# Patient Record
Sex: MALE | Race: WHITE | NOT HISPANIC OR LATINO | Employment: OTHER | URBAN - METROPOLITAN AREA
[De-identification: names, ages, dates, MRNs, and addresses within clinical notes are randomized per-mention and may not be internally consistent; named-entity substitution may affect disease eponyms.]

---

## 2017-05-03 ENCOUNTER — RECORDS - HEALTHEAST (OUTPATIENT)
Dept: LAB | Facility: CLINIC | Age: 67
End: 2017-05-03

## 2017-05-03 LAB
CHOLEST SERPL-MCNC: 163 MG/DL
FASTING STATUS PATIENT QL REPORTED: ABNORMAL
HDLC SERPL-MCNC: 34 MG/DL
LDLC SERPL CALC-MCNC: 100 MG/DL
PSA SERPL-MCNC: 0.2 NG/ML (ref 0–4.5)
TRIGL SERPL-MCNC: 146 MG/DL

## 2018-09-06 ENCOUNTER — RECORDS - HEALTHEAST (OUTPATIENT)
Dept: LAB | Facility: CLINIC | Age: 68
End: 2018-09-06

## 2018-09-06 LAB
ALBUMIN SERPL-MCNC: 3.6 G/DL (ref 3.5–5)
ALP SERPL-CCNC: 58 U/L (ref 45–120)
ALT SERPL W P-5'-P-CCNC: 36 U/L (ref 0–45)
ANION GAP SERPL CALCULATED.3IONS-SCNC: 9 MMOL/L (ref 5–18)
AST SERPL W P-5'-P-CCNC: 20 U/L (ref 0–40)
BASOPHILS # BLD AUTO: 0 THOU/UL (ref 0–0.2)
BASOPHILS NFR BLD AUTO: 0 % (ref 0–2)
BILIRUB SERPL-MCNC: 0.7 MG/DL (ref 0–1)
BUN SERPL-MCNC: 17 MG/DL (ref 8–22)
CALCIUM SERPL-MCNC: 9.2 MG/DL (ref 8.5–10.5)
CHLORIDE BLD-SCNC: 105 MMOL/L (ref 98–107)
CHOLEST SERPL-MCNC: 153 MG/DL
CO2 SERPL-SCNC: 27 MMOL/L (ref 22–31)
CREAT SERPL-MCNC: 0.85 MG/DL (ref 0.7–1.3)
EOSINOPHIL # BLD AUTO: 0.3 THOU/UL (ref 0–0.4)
EOSINOPHIL NFR BLD AUTO: 4 % (ref 0–6)
ERYTHROCYTE [DISTWIDTH] IN BLOOD BY AUTOMATED COUNT: 13.2 % (ref 11–14.5)
FASTING STATUS PATIENT QL REPORTED: ABNORMAL
GFR SERPL CREATININE-BSD FRML MDRD: >60 ML/MIN/1.73M2
GLUCOSE BLD-MCNC: 128 MG/DL (ref 70–125)
HCT VFR BLD AUTO: 41.7 % (ref 40–54)
HDLC SERPL-MCNC: 37 MG/DL
HGB BLD-MCNC: 13.8 G/DL (ref 14–18)
LDLC SERPL CALC-MCNC: 98 MG/DL
LYMPHOCYTES # BLD AUTO: 2.4 THOU/UL (ref 0.8–4.4)
LYMPHOCYTES NFR BLD AUTO: 37 % (ref 20–40)
MCH RBC QN AUTO: 30.8 PG (ref 27–34)
MCHC RBC AUTO-ENTMCNC: 33.1 G/DL (ref 32–36)
MCV RBC AUTO: 93 FL (ref 80–100)
MONOCYTES # BLD AUTO: 0.6 THOU/UL (ref 0–0.9)
MONOCYTES NFR BLD AUTO: 9 % (ref 2–10)
NEUTROPHILS # BLD AUTO: 3.2 THOU/UL (ref 2–7.7)
NEUTROPHILS NFR BLD AUTO: 50 % (ref 50–70)
PLATELET # BLD AUTO: 283 THOU/UL (ref 140–440)
PMV BLD AUTO: 10.7 FL (ref 8.5–12.5)
POTASSIUM BLD-SCNC: 4.4 MMOL/L (ref 3.5–5)
PROT SERPL-MCNC: 6.3 G/DL (ref 6–8)
PSA SERPL-MCNC: 0.2 NG/ML (ref 0–4.5)
RBC # BLD AUTO: 4.48 MILL/UL (ref 4.4–6.2)
SODIUM SERPL-SCNC: 141 MMOL/L (ref 136–145)
TRIGL SERPL-MCNC: 89 MG/DL
WBC: 6.4 THOU/UL (ref 4–11)

## 2019-03-26 ENCOUNTER — RECORDS - HEALTHEAST (OUTPATIENT)
Dept: LAB | Facility: CLINIC | Age: 69
End: 2019-03-26

## 2019-03-26 LAB
C REACTIVE PROTEIN LHE: 0.2 MG/DL (ref 0–0.8)
CK SERPL-CCNC: 92 U/L (ref 30–190)
ERYTHROCYTE [SEDIMENTATION RATE] IN BLOOD BY WESTERGREN METHOD: 5 MM/HR (ref 0–15)

## 2019-08-30 ENCOUNTER — RECORDS - HEALTHEAST (OUTPATIENT)
Dept: LAB | Facility: CLINIC | Age: 69
End: 2019-08-30

## 2019-08-30 LAB
CHOLEST SERPL-MCNC: 141 MG/DL
FASTING STATUS PATIENT QL REPORTED: ABNORMAL
HDLC SERPL-MCNC: 33 MG/DL
LDLC SERPL CALC-MCNC: 76 MG/DL
TRIGL SERPL-MCNC: 162 MG/DL

## 2020-09-29 ENCOUNTER — RECORDS - HEALTHEAST (OUTPATIENT)
Dept: LAB | Facility: CLINIC | Age: 70
End: 2020-09-29

## 2020-09-30 ENCOUNTER — RECORDS - HEALTHEAST (OUTPATIENT)
Dept: LAB | Facility: CLINIC | Age: 70
End: 2020-09-30

## 2020-09-30 LAB
CHOLEST SERPL-MCNC: 178 MG/DL
FASTING STATUS PATIENT QL REPORTED: ABNORMAL
FOLATE SERPL-MCNC: 6.2 NG/ML
HDLC SERPL-MCNC: 34 MG/DL
LDLC SERPL CALC-MCNC: 83 MG/DL
PSA SERPL-MCNC: 0.1 NG/ML (ref 0–4.5)
TRIGL SERPL-MCNC: 305 MG/DL
TSH SERPL DL<=0.005 MIU/L-ACNC: 2.27 UIU/ML (ref 0.3–5)
VIT B12 SERPL-MCNC: 386 PG/ML (ref 213–816)

## 2020-10-01 LAB — 25(OH)D3 SERPL-MCNC: 14.8 NG/ML (ref 30–80)

## 2020-12-22 ENCOUNTER — RECORDS - HEALTHEAST (OUTPATIENT)
Dept: LAB | Facility: CLINIC | Age: 70
End: 2020-12-22

## 2020-12-23 LAB — 25(OH)D3 SERPL-MCNC: 50.8 NG/ML (ref 30–80)

## 2021-05-27 ENCOUNTER — RECORDS - HEALTHEAST (OUTPATIENT)
Dept: ADMINISTRATIVE | Facility: CLINIC | Age: 71
End: 2021-05-27

## 2021-06-01 ENCOUNTER — RECORDS - HEALTHEAST (OUTPATIENT)
Dept: ADMINISTRATIVE | Facility: CLINIC | Age: 71
End: 2021-06-01

## 2021-09-17 ENCOUNTER — OFFICE VISIT (OUTPATIENT)
Dept: NEUROLOGY | Facility: CLINIC | Age: 71
End: 2021-09-17
Payer: COMMERCIAL

## 2021-09-17 ENCOUNTER — LAB (OUTPATIENT)
Dept: LAB | Facility: HOSPITAL | Age: 71
End: 2021-09-17
Payer: COMMERCIAL

## 2021-09-17 VITALS
DIASTOLIC BLOOD PRESSURE: 64 MMHG | HEIGHT: 74 IN | HEART RATE: 79 BPM | SYSTOLIC BLOOD PRESSURE: 118 MMHG | BODY MASS INDEX: 26.31 KG/M2 | WEIGHT: 205 LBS

## 2021-09-17 DIAGNOSIS — R63.4 WEIGHT LOSS: ICD-10-CM

## 2021-09-17 DIAGNOSIS — Z86.59 HX OF MAJOR DEPRESSION: ICD-10-CM

## 2021-09-17 DIAGNOSIS — G20.C PARKINSONISM, UNSPECIFIED PARKINSONISM TYPE (H): Primary | ICD-10-CM

## 2021-09-17 DIAGNOSIS — G20.C PARKINSONISM, UNSPECIFIED PARKINSONISM TYPE (H): ICD-10-CM

## 2021-09-17 LAB
ALBUMIN SERPL-MCNC: 3.7 G/DL (ref 3.5–5)
ALP SERPL-CCNC: 76 U/L (ref 45–120)
ALT SERPL W P-5'-P-CCNC: 25 U/L (ref 0–45)
ANION GAP SERPL CALCULATED.3IONS-SCNC: 11 MMOL/L (ref 5–18)
AST SERPL W P-5'-P-CCNC: 18 U/L (ref 0–40)
BILIRUB SERPL-MCNC: 0.8 MG/DL (ref 0–1)
BUN SERPL-MCNC: 17 MG/DL (ref 8–28)
CALCIUM SERPL-MCNC: 10 MG/DL (ref 8.5–10.5)
CHLORIDE BLD-SCNC: 101 MMOL/L (ref 98–107)
CO2 SERPL-SCNC: 26 MMOL/L (ref 22–31)
CREAT SERPL-MCNC: 0.81 MG/DL (ref 0.7–1.3)
GFR SERPL CREATININE-BSD FRML MDRD: 90 ML/MIN/1.73M2
GLUCOSE BLD-MCNC: 88 MG/DL (ref 70–125)
POTASSIUM BLD-SCNC: 4.5 MMOL/L (ref 3.5–5)
PROT SERPL-MCNC: 6.9 G/DL (ref 6–8)
SODIUM SERPL-SCNC: 138 MMOL/L (ref 136–145)
TSH SERPL DL<=0.005 MIU/L-ACNC: 2.12 UIU/ML (ref 0.3–5)
VIT B12 SERPL-MCNC: 425 PG/ML (ref 213–816)

## 2021-09-17 PROCEDURE — 82390 ASSAY OF CERULOPLASMIN: CPT

## 2021-09-17 PROCEDURE — 84443 ASSAY THYROID STIM HORMONE: CPT

## 2021-09-17 PROCEDURE — 36415 COLL VENOUS BLD VENIPUNCTURE: CPT

## 2021-09-17 PROCEDURE — 82607 VITAMIN B-12: CPT

## 2021-09-17 PROCEDURE — 80053 COMPREHEN METABOLIC PANEL: CPT

## 2021-09-17 PROCEDURE — 99205 OFFICE O/P NEW HI 60 MIN: CPT | Performed by: PSYCHIATRY & NEUROLOGY

## 2021-09-17 PROCEDURE — 83921 ORGANIC ACID SINGLE QUANT: CPT

## 2021-09-17 RX ORDER — FLUOXETINE 10 MG/1
10 CAPSULE ORAL DAILY
COMMUNITY
End: 2022-07-27

## 2021-09-17 RX ORDER — ARIPIPRAZOLE 2 MG/1
2 TABLET ORAL DAILY
COMMUNITY
End: 2022-07-27

## 2021-09-17 RX ORDER — FINASTERIDE 5 MG/1
5 TABLET, FILM COATED ORAL DAILY
COMMUNITY

## 2021-09-17 ASSESSMENT — MIFFLIN-ST. JEOR: SCORE: 1759.62

## 2021-09-17 NOTE — NURSING NOTE
Chief Complaint   Patient presents with     Shaking     Slight shaking bilateral wrist, more on right. Pt reports intermittent bitting down.      Victoria Han MA on 9/17/2021 at 11:26 AM

## 2021-09-17 NOTE — PROGRESS NOTES
NEUROLOGY OUTPATIENT CONSULT NOTE   Sep 17, 2021     CHIEF COMPLAINT/REASON FOR VISIT/REASON FOR CONSULT  Patient presents with:  Shaking: Slight shaking bilateral wrist, more on right. Pt reports intermittent bitting down.     REASON FOR CONSULTATION- Tremors      HISTORY OF PRESENT ILLNESS  Bala Hopper is a 70 year old male seen today for evaluation of Parkinson's disease.  He reports that symptoms have been going on for the last 6 months and he has noted himself over the last 3 months.  He does suffer from depression which has been worse and he is lost a lot of weight in the last 4 months.  Does have some difficulty with his jaw/twitching of the jaw.  Reports some shaking of his right hand.  This is mainly when he is sitting and not doing anything.  He does have difficulty keeping up with people.  Occasionally will feel lightheaded.  Does feel stiffer when he tries to walk.  He has not been on any antipsychotics.  Is on Abilify currently for his depression.  His writing has become less legible compared to before.  He is right-handed.  He has not been eating and has been sleeping and most of the time because of his depression.  Denies any cognitive issues.      Previous history is reviewed and this is unchanged.    PAST MEDICAL/SURGICAL HISTORY  History reviewed. No pertinent past medical history.  There is no problem list on file for this patient.  Significant for diabetes, high cholesterol, depression, sleep disorder      FAMILY HISTORY  History reviewed. No pertinent family history.   Family history positive for severe Parkinson's and a great uncle    SOCIAL HISTORY  Social History     Tobacco Use     Smoking status: Never Smoker     Smokeless tobacco: Never Used   Substance Use Topics     Alcohol use: Not Currently     Drug use: Never       SYSTEMS REVIEW  Twelve-system ROS was done and other than the HPI this was negative except for balance and coordination problems, movement disorder, ringing in the  "ears, hearing loss, vision symptoms, sleepiness during the day, sleeping problems, anxiety, depression, weight loss, difficulty breathing during sleep    MEDICATIONS  ARIPiprazole (ABILIFY) 2 MG tablet, Take 2 mg by mouth daily  ascorbic acid (VITAMIN C) 500 MG tablet, [ASCORBIC ACID (VITAMIN C) 500 MG TABLET] Take 1,000 mg by mouth daily.  aspirin 81 MG EC tablet, [ASPIRIN 81 MG EC TABLET] Take 81 mg by mouth daily.  atorvastatin (LIPITOR) 40 MG tablet, [ATORVASTATIN (LIPITOR) 40 MG TABLET] Take 40 mg by mouth daily.  finasteride (PROSCAR) 5 MG tablet, Take 5 mg by mouth daily  FLUoxetine (PROZAC) 10 MG capsule, Take 10 mg by mouth daily  glipiZIDE (GLUCOTROL) 5 MG tablet, [GLIPIZIDE (GLUCOTROL) 5 MG TABLET] Take 5 mg by mouth 2 (two) times a day before meals.  Insulin Glargine-Lixisenatide (SOLIQUA SC), 26 Units  metFORMIN (GLUCOPHAGE) 1000 MG tablet, [METFORMIN (GLUCOPHAGE) 1000 MG TABLET] Take 1,000 mg by mouth 2 (two) times a day with meals.  tamsulosin (FLOMAX) 0.4 mg Cp24, [TAMSULOSIN (FLOMAX) 0.4 MG CP24] Take 0.4 mg by mouth Daily after breakfast.  escitalopram oxalate (LEXAPRO) 20 MG tablet, [ESCITALOPRAM OXALATE (LEXAPRO) 20 MG TABLET] Take 20 mg by mouth daily. (Patient not taking: Reported on 9/17/2021)    No current facility-administered medications on file prior to visit.       PHYSICAL EXAMINATION  VITALS: /64 (BP Location: Left arm, Patient Position: Sitting)   Pulse 79   Ht 1.88 m (6' 2\")   Wt 93 kg (205 lb)   BMI 26.32 kg/m    GENERAL: Healthy appearing, alert, no acute distress, normal habitus.  CARDIOVASCULAR: Extremities warm and well perfused. Pulses present.   EYES: Funduscopic exam limited.   NEUROLOGICAL:  Patient is awake and oriented to self, place and time.  Attention span is normal.  Memory is grossly intact.  Language is fluent and follows commands appropriately.  Appropriate fund of knowledge. Cranial nerves 2-12 are intact. Masked facies.  There is no pronator drift.  Motor " exam shows 5/5 strength in all extremities.  Tone is symmetric bilaterally in upper and lower extremities with cog wheeling in both UE.  Reflexes are symmetric and 2+ in upper extremities and lower extremities. Sensory exam is grossly intact to light touch, pin prick and vibration.  Finger to nose and heel to shin is without dysmetria.  Romberg is negative.  Gait is normal except decreased left arm swing and the patient is able to do tandem walk and walk on toes and heels with mild difficulty.  No significant difficulty with concentric circles.     DIAGNOSTICS    IMPRESSION/REPORT/PLAN  Parkinsonism, unspecified Parkinsonism type (H)  Hx of major depression  Weight loss    This is a 70 year old male with masked facies, cogwheeling, decreased arm swing on the left side, history of depression with Abilify use and weight loss.  Patient does have signs and symptoms of parkinsonism though this could be brought on by Abilify use or could be secondary to depression.  His depression is not under good control.  The weight loss would be more related to depression.  I discussed about trying Sinemet and he would prefer to do a DaTscan to confirm the diagnosis.  We will set him up with a DaTscan.  We will check blood work to look for causes of tremors/parkinsonism.  I will further have him work with a psychiatrist to get off the Abilify to see if that helps.    I can see him back in 6 to 8 weeks.    -     NM Brain Image Tomographic(Spect) Datscan; Future  -     Vitamin B12; Future  -     Methylmalonic Acid; Future  -     TSH with free T4 reflex; Future  -     Comprehensive metabolic panel; Future  -     Copper level; Future  -     Ceruloplasmin; Future    Return in about 2 months (around 11/17/2021) for In-Clinic Visit, After testing.    Over 61 minutes were spent coordinating the care for the patient on the day of the encounter.  This includes previsit, during visit and post visit activities as documented above.  Multiple  treatment options/multiple causes of symptoms discussed with deciding on plan.  (Activities include but not inclusive of reviewing chart, reviewing outside records, reviewing labs and imaging study results as well as the images, patient visit time including getting history and exam,  use if applicable, review of test results with the patient and coming up with a plan in a shared model, counseling patient and family, education and answering patient questions, EMR , EMR diagnosis entry and problem list management, medication reconciliation and prescription management if applicable, paperwork if applicable, printing documents and documentation of the visit activities.)        Howard Contreras MD  Neurologist  [facility]  Tel:- [phone]    This note was dictated using voice recognition software.  Any grammatical or context distortions are unintentional and inherent to the software.

## 2021-09-20 LAB — CERULOPLASMIN SERPL-MCNC: 17 MG/DL (ref 20–60)

## 2021-09-23 LAB — METHYLMALONATE SERPL-SCNC: 0.17 UMOL/L (ref 0–0.4)

## 2021-09-24 ENCOUNTER — TELEPHONE (OUTPATIENT)
Dept: NUCLEAR MEDICINE | Facility: CLINIC | Age: 71
End: 2021-09-24

## 2021-09-27 ENCOUNTER — OFFICE VISIT (OUTPATIENT)
Dept: NEUROLOGY | Facility: CLINIC | Age: 71
End: 2021-09-27
Payer: COMMERCIAL

## 2021-09-27 VITALS
HEIGHT: 74 IN | DIASTOLIC BLOOD PRESSURE: 62 MMHG | HEART RATE: 82 BPM | WEIGHT: 205 LBS | SYSTOLIC BLOOD PRESSURE: 117 MMHG | BODY MASS INDEX: 26.31 KG/M2

## 2021-09-27 DIAGNOSIS — R63.4 WEIGHT LOSS: ICD-10-CM

## 2021-09-27 DIAGNOSIS — G20.C PARKINSONISM, UNSPECIFIED PARKINSONISM TYPE (H): Primary | ICD-10-CM

## 2021-09-27 DIAGNOSIS — E83.00 LOW CERULOPLASMIN LEVEL (H): ICD-10-CM

## 2021-09-27 DIAGNOSIS — Z86.59 HX OF MAJOR DEPRESSION: ICD-10-CM

## 2021-09-27 PROCEDURE — 99214 OFFICE O/P EST MOD 30 MIN: CPT | Performed by: PSYCHIATRY & NEUROLOGY

## 2021-09-27 ASSESSMENT — MIFFLIN-ST. JEOR: SCORE: 1759.62

## 2021-09-27 NOTE — NURSING NOTE
Chief Complaint   Patient presents with     Results     Discuss lab results. Datscan scheduled for 10/12      Victoria Han MA on 9/27/2021 at 12:57 PM

## 2021-09-27 NOTE — LETTER
9/27/2021         RE: Bala Hopper  [address]  [facility] [address]        Dear Colleague,    Thank you for referring your patient, Bala Hopper, to the [facility] [place]. Please see a copy of my visit note below.    NEUROLOGY OUTPATIENT PROGRESS NOTE   Sep 27, 2021     CHIEF COMPLAINT/REASON FOR VISIT/REASON FOR CONSULT  Patient presents with:  Results: Discuss lab results. Datscan scheduled for 10/12     REASON FOR CONSULTATION- Tremors      HISTORY OF PRESENT ILLNESS  Bala Hopper is a 70 year old male seen today for evaluation of Parkinson's disease.  He reports that symptoms have been going on for the last 6 months and he has noted himself over the last 3 months.  He does suffer from depression which has been worse and he is lost a lot of weight in the last 4 months.  Does have some difficulty with his jaw/twitching of the jaw.  Reports some shaking of his right hand.  This is mainly when he is sitting and not doing anything.  He does have difficulty keeping up with people.  Occasionally will feel lightheaded.  Does feel stiffer when he tries to walk.  He has not been on any antipsychotics.  Is on Abilify currently for his depression.  His writing has become less legible compared to before.  He is right-handed.  He has not been eating and has been sleeping and most of the time because of his depression.  Denies any cognitive issues.      9/27/21  Patient returns today.  He reports a spell of dizziness.  Did throw up afterwards.  Tremor is about the same.  Remains on Abilify.  Is also on Prozac.  Was having a lot of rages before she he was on Abilify.  Has not seen a psychiatrist.  Did do the blood work though has not been able to do the DaTscan.    Previous history is reviewed and this is unchanged.    PAST MEDICAL/SURGICAL HISTORY  No past medical history on file.  There is no problem list on file for this patient.  Significant for diabetes, high cholesterol,  depression, sleep disorder      FAMILY HISTORY  No family history on file.   Family history positive for severe Parkinson's and a great uncle    SOCIAL HISTORY  Social History     Tobacco Use     Smoking status: Never Smoker     Smokeless tobacco: Never Used   Substance Use Topics     Alcohol use: Not Currently     Drug use: Never       SYSTEMS REVIEW  Twelve-system ROS was done and other than the HPI this was negative except for balance and coordination problems, movement disorder, ringing in the ears, hearing loss, vision symptoms, sleepiness during the day, sleeping problems, anxiety, depression, weight loss, difficulty breathing during sleep  Reports no other new issues today.    MEDICATIONS  ARIPiprazole (ABILIFY) 2 MG tablet, Take 2 mg by mouth daily  ascorbic acid (VITAMIN C) 500 MG tablet, [ASCORBIC ACID (VITAMIN C) 500 MG TABLET] Take 1,000 mg by mouth daily.  aspirin 81 MG EC tablet, [ASPIRIN 81 MG EC TABLET] Take 81 mg by mouth daily.  atorvastatin (LIPITOR) 40 MG tablet, [ATORVASTATIN (LIPITOR) 40 MG TABLET] Take 40 mg by mouth daily.  finasteride (PROSCAR) 5 MG tablet, Take 5 mg by mouth daily  FLUoxetine (PROZAC) 10 MG capsule, Take 10 mg by mouth daily  glipiZIDE (GLUCOTROL) 5 MG tablet, [GLIPIZIDE (GLUCOTROL) 5 MG TABLET] Take 5 mg by mouth 2 (two) times a day before meals.  Insulin Glargine-Lixisenatide (SOLIQUA SC), 26 Units  metFORMIN (GLUCOPHAGE) 1000 MG tablet, [METFORMIN (GLUCOPHAGE) 1000 MG TABLET] Take 1,000 mg by mouth 2 (two) times a day with meals.  tamsulosin (FLOMAX) 0.4 mg Cp24, [TAMSULOSIN (FLOMAX) 0.4 MG CP24] Take 0.4 mg by mouth Daily after breakfast.  escitalopram oxalate (LEXAPRO) 20 MG tablet, [ESCITALOPRAM OXALATE (LEXAPRO) 20 MG TABLET] Take 20 mg by mouth daily. (Patient not taking: Reported on 9/17/2021)    No current facility-administered medications on file prior to visit.       PHYSICAL EXAMINATION  VITALS: /62 (BP Location: Left arm, Patient Position: Sitting)    "Pulse 82   Ht 1.88 m (6' 2\")   Wt 93 kg (205 lb)   BMI 26.32 kg/m    GENERAL: Healthy appearing, alert, no acute distress, normal habitus.  CARDIOVASCULAR: Extremities warm and well perfused. Pulses present.   NEUROLOGICAL:  Patient is awake and oriented to self, place and time.  Attention span is normal.  Memory is grossly intact.  Language is fluent and follows commands appropriately.  Appropriate fund of knowledge. Cranial nerves 2-12 are intact. Masked facies.  There is no pronator drift.  Motor exam shows 5/5 strength in all extremities.  Tone is symmetric bilaterally in upper and lower extremities with cog wheeling in both UE-somewhat improved.  (Previously reflexes are symmetric and 2+ in upper extremities and lower extremities. Sensory exam is grossly intact to light touch, pin prick and vibration.)  Finger to nose and heel to shin is without dysmetria.  Romberg is negative.  Gait is normal except decreased left arm swing and the patient is able to do tandem walk and walk on toes and heels with mild difficulty.  Previously-no significant difficulty with concentric circles.     LABS  Component      Latest Ref Rng & Units 9/17/2021   Sodium      136 - 145 mmol/L 138   Potassium      3.5 - 5.0 mmol/L 4.5   Chloride      98 - 107 mmol/L 101   Carbon Dioxide      22 - 31 mmol/L 26   Anion Gap      5 - 18 mmol/L 11   Urea Nitrogen      8 - 28 mg/dL 17   Creatinine      0.70 - 1.30 mg/dL 0.81   Calcium      8.5 - 10.5 mg/dL 10.0   Glucose      70 - 125 mg/dL 88   Alkaline Phosphatase      45 - 120 U/L 76   AST      0 - 40 U/L 18   ALT      0 - 45 U/L 25   Protein Total      6.0 - 8.0 g/dL 6.9   Albumin      3.5 - 5.0 g/dL 3.7   Bilirubin Total      0.0 - 1.0 mg/dL 0.8   GFR Estimate      >60 mL/min/1.73m2 90   Vitamin B12      213 - 816 pg/mL 425   Methylmalonic Acid      0.00 - 0.40 umol/L 0.17   TSH      0.30 - 5.00 uIU/mL 2.12   Ceruloplasmin      20 - 60 mg/dL 17 (L) "       DIAGNOSTICS    IMPRESSION/REPORT/PLAN  Parkinsonism, unspecified Parkinsonism type (H)  Hx of major depression  Weight loss  Low ceruloplasmin    This is a 70 year old male with masked facies, cogwheeling, decreased arm swing on the left side, history of depression with Abilify use and weight loss.  Patient does have signs and symptoms of parkinsonism though this could be brought on by Abilify use or could be secondary to depression.  His depression is not under good control.  The weight loss would be more related to depression.  I discussed about trying Sinemet and he would prefer to do a DaTscan to confirm the diagnosis.  DaTscan is scheduled in a few weeks.  I can see him back after that.  Blood work did not show any causes for his tremors.  The low ceruloplasmin is borderline and most likely unrelated to his tremors.  We will encourage him to take a multivitamin with copper. I will further have him work with a psychiatrist to get off the Abilify to see if that helps.    Discussed proper parkinsonism versus extrapyramidal side effects with the patient and family.    I can see him back in 4 weeks.    -     NM Brain Image Tomographic(Spect) Datscan; Future  -    Multivitamin with copper    Return in about 1 month (around 10/27/2021) for In-Clinic Visit, After testing.    Over 31 minutes were spent coordinating the care for the patient on the day of the encounter.  This includes previsit, during visit and post visit activities as documented above.  Reviewing multiple test discussing different types of parkinsonism.  New problem.  (Activities include but not inclusive of reviewing chart, reviewing outside records, reviewing labs and imaging study results as well as the images, patient visit time including getting history and exam,  use if applicable, review of test results with the patient and coming up with a plan in a shared model, counseling patient and family, education and answering patient  questions, EMR , EMR diagnosis entry and problem list management, medication reconciliation and prescription management if applicable, paperwork if applicable, printing documents and documentation of the visit activities.)        Howard Contreras MD  Neurologist  [facility]  Tel:- [phone]    This note was dictated using voice recognition software.  Any grammatical or context distortions are unintentional and inherent to the software.        Again, thank you for allowing me to participate in the care of your patient.        Sincerely,        Howard Contreras MD

## 2021-09-27 NOTE — PROGRESS NOTES
NEUROLOGY OUTPATIENT PROGRESS NOTE   Sep 27, 2021     CHIEF COMPLAINT/REASON FOR VISIT/REASON FOR CONSULT  Patient presents with:  Results: Discuss lab results. Datscan scheduled for 10/12     REASON FOR CONSULTATION- Tremors      HISTORY OF PRESENT ILLNESS  Bala Hopper is a 70 year old male seen today for evaluation of Parkinson's disease.  He reports that symptoms have been going on for the last 6 months and he has noted himself over the last 3 months.  He does suffer from depression which has been worse and he is lost a lot of weight in the last 4 months.  Does have some difficulty with his jaw/twitching of the jaw.  Reports some shaking of his right hand.  This is mainly when he is sitting and not doing anything.  He does have difficulty keeping up with people.  Occasionally will feel lightheaded.  Does feel stiffer when he tries to walk.  He has not been on any antipsychotics.  Is on Abilify currently for his depression.  His writing has become less legible compared to before.  He is right-handed.  He has not been eating and has been sleeping and most of the time because of his depression.  Denies any cognitive issues.      9/27/21  Patient returns today.  He reports a spell of dizziness.  Did throw up afterwards.  Tremor is about the same.  Remains on Abilify.  Is also on Prozac.  Was having a lot of rages before she he was on Abilify.  Has not seen a psychiatrist.  Did do the blood work though has not been able to do the DaTscan.    Previous history is reviewed and this is unchanged.    PAST MEDICAL/SURGICAL HISTORY  No past medical history on file.  There is no problem list on file for this patient.  Significant for diabetes, high cholesterol, depression, sleep disorder      FAMILY HISTORY  No family history on file.   Family history positive for severe Parkinson's and a great uncle    SOCIAL HISTORY  Social History     Tobacco Use     Smoking status: Never Smoker     Smokeless tobacco: Never Used  "  Substance Use Topics     Alcohol use: Not Currently     Drug use: Never       SYSTEMS REVIEW  Twelve-system ROS was done and other than the HPI this was negative except for balance and coordination problems, movement disorder, ringing in the ears, hearing loss, vision symptoms, sleepiness during the day, sleeping problems, anxiety, depression, weight loss, difficulty breathing during sleep  Reports no other new issues today.    MEDICATIONS  ARIPiprazole (ABILIFY) 2 MG tablet, Take 2 mg by mouth daily  ascorbic acid (VITAMIN C) 500 MG tablet, [ASCORBIC ACID (VITAMIN C) 500 MG TABLET] Take 1,000 mg by mouth daily.  aspirin 81 MG EC tablet, [ASPIRIN 81 MG EC TABLET] Take 81 mg by mouth daily.  atorvastatin (LIPITOR) 40 MG tablet, [ATORVASTATIN (LIPITOR) 40 MG TABLET] Take 40 mg by mouth daily.  finasteride (PROSCAR) 5 MG tablet, Take 5 mg by mouth daily  FLUoxetine (PROZAC) 10 MG capsule, Take 10 mg by mouth daily  glipiZIDE (GLUCOTROL) 5 MG tablet, [GLIPIZIDE (GLUCOTROL) 5 MG TABLET] Take 5 mg by mouth 2 (two) times a day before meals.  Insulin Glargine-Lixisenatide (SOLIQUA SC), 26 Units  metFORMIN (GLUCOPHAGE) 1000 MG tablet, [METFORMIN (GLUCOPHAGE) 1000 MG TABLET] Take 1,000 mg by mouth 2 (two) times a day with meals.  tamsulosin (FLOMAX) 0.4 mg Cp24, [TAMSULOSIN (FLOMAX) 0.4 MG CP24] Take 0.4 mg by mouth Daily after breakfast.  escitalopram oxalate (LEXAPRO) 20 MG tablet, [ESCITALOPRAM OXALATE (LEXAPRO) 20 MG TABLET] Take 20 mg by mouth daily. (Patient not taking: Reported on 9/17/2021)    No current facility-administered medications on file prior to visit.       PHYSICAL EXAMINATION  VITALS: /62 (BP Location: Left arm, Patient Position: Sitting)   Pulse 82   Ht 1.88 m (6' 2\")   Wt 93 kg (205 lb)   BMI 26.32 kg/m    GENERAL: Healthy appearing, alert, no acute distress, normal habitus.  CARDIOVASCULAR: Extremities warm and well perfused. Pulses present.   NEUROLOGICAL:  Patient is awake and oriented to " self, place and time.  Attention span is normal.  Memory is grossly intact.  Language is fluent and follows commands appropriately.  Appropriate fund of knowledge. Cranial nerves 2-12 are intact. Masked facies.  There is no pronator drift.  Motor exam shows 5/5 strength in all extremities.  Tone is symmetric bilaterally in upper and lower extremities with cog wheeling in both UE-somewhat improved.  (Previously reflexes are symmetric and 2+ in upper extremities and lower extremities. Sensory exam is grossly intact to light touch, pin prick and vibration.)  Finger to nose and heel to shin is without dysmetria.  Romberg is negative.  Gait is normal except decreased left arm swing and the patient is able to do tandem walk and walk on toes and heels with mild difficulty.  Previously-no significant difficulty with concentric circles.     LABS  Component      Latest Ref Rng & Units 9/17/2021   Sodium      136 - 145 mmol/L 138   Potassium      3.5 - 5.0 mmol/L 4.5   Chloride      98 - 107 mmol/L 101   Carbon Dioxide      22 - 31 mmol/L 26   Anion Gap      5 - 18 mmol/L 11   Urea Nitrogen      8 - 28 mg/dL 17   Creatinine      0.70 - 1.30 mg/dL 0.81   Calcium      8.5 - 10.5 mg/dL 10.0   Glucose      70 - 125 mg/dL 88   Alkaline Phosphatase      45 - 120 U/L 76   AST      0 - 40 U/L 18   ALT      0 - 45 U/L 25   Protein Total      6.0 - 8.0 g/dL 6.9   Albumin      3.5 - 5.0 g/dL 3.7   Bilirubin Total      0.0 - 1.0 mg/dL 0.8   GFR Estimate      >60 mL/min/1.73m2 90   Vitamin B12      213 - 816 pg/mL 425   Methylmalonic Acid      0.00 - 0.40 umol/L 0.17   TSH      0.30 - 5.00 uIU/mL 2.12   Ceruloplasmin      20 - 60 mg/dL 17 (L)       DIAGNOSTICS    IMPRESSION/REPORT/PLAN  Parkinsonism, unspecified Parkinsonism type (H)  Hx of major depression  Weight loss  Low ceruloplasmin    This is a 70 year old male with masked facies, cogwheeling, decreased arm swing on the left side, history of depression with Abilify use and weight  loss.  Patient does have signs and symptoms of parkinsonism though this could be brought on by Abilify use or could be secondary to depression.  His depression is not under good control.  The weight loss would be more related to depression.  I discussed about trying Sinemet and he would prefer to do a DaTscan to confirm the diagnosis.  DaTscan is scheduled in a few weeks.  I can see him back after that.  Blood work did not show any causes for his tremors.  The low ceruloplasmin is borderline and most likely unrelated to his tremors.  We will encourage him to take a multivitamin with copper. I will further have him work with a psychiatrist to get off the Abilify to see if that helps.    Discussed proper parkinsonism versus extrapyramidal side effects with the patient and family.    I can see him back in 4 weeks.    -     NM Brain Image Tomographic(Spect) Datscan; Future  -    Multivitamin with copper    Return in about 1 month (around 10/27/2021) for In-Clinic Visit, After testing.    Over 31 minutes were spent coordinating the care for the patient on the day of the encounter.  This includes previsit, during visit and post visit activities as documented above.  Reviewing multiple test discussing different types of parkinsonism.  New problem.  (Activities include but not inclusive of reviewing chart, reviewing outside records, reviewing labs and imaging study results as well as the images, patient visit time including getting history and exam,  use if applicable, review of test results with the patient and coming up with a plan in a shared model, counseling patient and family, education and answering patient questions, EMR , EMR diagnosis entry and problem list management, medication reconciliation and prescription management if applicable, paperwork if applicable, printing documents and documentation of the visit activities.)        Howard Contreras MD  Neurologist  [facility]  [facility]  Tel:- [phone]    This note was dictated using voice recognition software.  Any grammatical or context distortions are unintentional and inherent to the software.

## 2021-10-12 ENCOUNTER — ANCILLARY PROCEDURE (OUTPATIENT)
Dept: NUCLEAR MEDICINE | Facility: CLINIC | Age: 71
End: 2021-10-12
Attending: PSYCHIATRY & NEUROLOGY
Payer: COMMERCIAL

## 2021-10-12 DIAGNOSIS — Z86.59 HX OF MAJOR DEPRESSION: ICD-10-CM

## 2021-10-12 DIAGNOSIS — R63.4 WEIGHT LOSS: ICD-10-CM

## 2021-10-12 DIAGNOSIS — G20.C PARKINSONISM, UNSPECIFIED PARKINSONISM TYPE (H): ICD-10-CM

## 2021-10-12 PROCEDURE — 78803 RP LOCLZJ TUM SPECT 1 AREA: CPT | Mod: GC | Performed by: STUDENT IN AN ORGANIZED HEALTH CARE EDUCATION/TRAINING PROGRAM

## 2021-10-12 PROCEDURE — A9584 IODINE I-123 IOFLUPANE: HCPCS | Performed by: STUDENT IN AN ORGANIZED HEALTH CARE EDUCATION/TRAINING PROGRAM

## 2021-10-19 ENCOUNTER — OFFICE VISIT (OUTPATIENT)
Dept: NEUROLOGY | Facility: CLINIC | Age: 71
End: 2021-10-19
Payer: COMMERCIAL

## 2021-10-19 VITALS
WEIGHT: 205 LBS | HEIGHT: 74 IN | DIASTOLIC BLOOD PRESSURE: 65 MMHG | SYSTOLIC BLOOD PRESSURE: 130 MMHG | BODY MASS INDEX: 26.31 KG/M2 | HEART RATE: 85 BPM

## 2021-10-19 DIAGNOSIS — Z86.59 HX OF MAJOR DEPRESSION: ICD-10-CM

## 2021-10-19 DIAGNOSIS — E83.00 LOW CERULOPLASMIN LEVEL (H): Primary | ICD-10-CM

## 2021-10-19 DIAGNOSIS — G20.C PARKINSONISM, UNSPECIFIED PARKINSONISM TYPE (H): ICD-10-CM

## 2021-10-19 PROCEDURE — 99214 OFFICE O/P EST MOD 30 MIN: CPT | Performed by: PSYCHIATRY & NEUROLOGY

## 2021-10-19 RX ORDER — FLUOXETINE 20 MG/1
60 TABLET, FILM COATED ORAL DAILY
COMMUNITY
Start: 2021-10-09 | End: 2022-08-26

## 2021-10-19 ASSESSMENT — MIFFLIN-ST. JEOR: SCORE: 1754.62

## 2021-10-19 NOTE — PROGRESS NOTES
NEUROLOGY OUTPATIENT PROGRESS NOTE   Oct 19, 2021     CHIEF COMPLAINT/REASON FOR VISIT/REASON FOR CONSULT  Patient presents with:  Results: Discuss datScan     REASON FOR CONSULTATION- Tremors      HISTORY OF PRESENT ILLNESS  Bala Hopper is a 71 year old male seen today for evaluation of Parkinson's disease.  He reports that symptoms have been going on for the last 6 months and he has noted himself over the last 3 months.  He does suffer from depression which has been worse and he is lost a lot of weight in the last 4 months.  Does have some difficulty with his jaw/twitching of the jaw.  Reports some shaking of his right hand.  This is mainly when he is sitting and not doing anything.  He does have difficulty keeping up with people.  Occasionally will feel lightheaded.  Does feel stiffer when he tries to walk.  He has not been on any antipsychotics.  Is on Abilify currently for his depression.  His writing has become less legible compared to before.  He is right-handed.  He has not been eating and has been sleeping and most of the time because of his depression.  Denies any cognitive issues.      9/27/21  Patient returns today.  He reports a spell of dizziness.  Did throw up afterwards.  Tremor is about the same.  Remains on Abilify.  Is also on Prozac.  Was having a lot of rages before she he was on Abilify.  Has not seen a psychiatrist.  Did do the blood work though has not been able to do the DaTscan.    10/19/21  Patient returns today.  Symptoms are about the same.  Did have the DaTscan which came back negative.  Has not made an appointment with psychiatry to get off the Abilify.  Plans to do that later today/tomorrow.  Did have a spell where he was trying to paint and reach up where his legs started shaking.  Was also feeling lightheaded.  Discussed that his symptoms were not completely suggestive of Parkinson's disease.    Previous history is reviewed and this is unchanged.    PAST MEDICAL/SURGICAL  HISTORY  No past medical history on file.  There is no problem list on file for this patient.  Significant for diabetes, high cholesterol, depression, sleep disorder      FAMILY HISTORY  History reviewed. No pertinent family history.   Family history positive for severe Parkinson's and a great uncle    SOCIAL HISTORY  Social History     Tobacco Use     Smoking status: Never Smoker     Smokeless tobacco: Never Used   Substance Use Topics     Alcohol use: Not Currently     Drug use: Never       SYSTEMS REVIEW  Twelve-system ROS was done and other than the HPI this was negative except for balance and coordination problems, movement disorder, ringing in the ears, hearing loss, vision symptoms, sleepiness during the day, sleeping problems, anxiety, depression, weight loss, difficulty breathing during sleep  Reports no other new issues today.    MEDICATIONS  ARIPiprazole (ABILIFY) 2 MG tablet, Take 2 mg by mouth daily  ascorbic acid (VITAMIN C) 500 MG tablet, [ASCORBIC ACID (VITAMIN C) 500 MG TABLET] Take 1,000 mg by mouth daily.  aspirin 81 MG EC tablet, [ASPIRIN 81 MG EC TABLET] Take 81 mg by mouth daily.  atorvastatin (LIPITOR) 40 MG tablet, [ATORVASTATIN (LIPITOR) 40 MG TABLET] Take 40 mg by mouth daily.  finasteride (PROSCAR) 5 MG tablet, Take 5 mg by mouth daily  FLUoxetine (PROZAC) 20 MG capsule,   glipiZIDE (GLUCOTROL) 5 MG tablet, [GLIPIZIDE (GLUCOTROL) 5 MG TABLET] Take 5 mg by mouth 2 (two) times a day before meals.  Insulin Glargine-Lixisenatide (SOLIQUA SC), 26 Units  metFORMIN (GLUCOPHAGE) 1000 MG tablet, [METFORMIN (GLUCOPHAGE) 1000 MG TABLET] Take 1,000 mg by mouth 2 (two) times a day with meals.  tamsulosin (FLOMAX) 0.4 mg Cp24, [TAMSULOSIN (FLOMAX) 0.4 MG CP24] Take 0.4 mg by mouth Daily after breakfast.  escitalopram oxalate (LEXAPRO) 20 MG tablet, [ESCITALOPRAM OXALATE (LEXAPRO) 20 MG TABLET] Take 20 mg by mouth daily. (Patient not taking: Reported on 9/17/2021)  FLUoxetine (PROZAC) 10 MG capsule, Take  "10 mg by mouth daily (Patient not taking: Reported on 10/19/2021)    No current facility-administered medications on file prior to visit.       PHYSICAL EXAMINATION  VITALS: /65 (BP Location: Left arm, Patient Position: Sitting)   Pulse 85   Ht 1.88 m (6' 2\")   Wt 93 kg (205 lb)   BMI 26.32 kg/m    GENERAL: Healthy appearing, alert, no acute distress, normal habitus.  CARDIOVASCULAR: Extremities warm and well perfused. Pulses present.   NEUROLOGICAL:  Patient is awake and oriented to self, place and time.  Attention span is normal.  Memory is grossly intact.  Language is fluent and follows commands appropriately.  Appropriate fund of knowledge. Cranial nerves 2-12 are intact. Masked facies.  There is no pronator drift.  Motor exam shows 5/5 strength in all extremities.  Tone is symmetric bilaterally in upper and lower extremities with mild rigidity in upper extremities both sides.  (Previously reflexes are symmetric and 2+ in upper extremities and lower extremities. Sensory exam is grossly intact to light touch, pin prick and vibration.)  Finger to nose and heel to shin is without dysmetria.  Romberg is negative.  Gait is normal except decreased left arm swing and the patient is able to do tandem walk and walk on toes and heels with mild difficulty. (No significant improvement.)  Previously-no significant difficulty with concentric circles.     LABS  Component      Latest Ref Rng & Units 9/17/2021   Sodium      136 - 145 mmol/L 138   Potassium      3.5 - 5.0 mmol/L 4.5   Chloride      98 - 107 mmol/L 101   Carbon Dioxide      22 - 31 mmol/L 26   Anion Gap      5 - 18 mmol/L 11   Urea Nitrogen      8 - 28 mg/dL 17   Creatinine      0.70 - 1.30 mg/dL 0.81   Calcium      8.5 - 10.5 mg/dL 10.0   Glucose      70 - 125 mg/dL 88   Alkaline Phosphatase      45 - 120 U/L 76   AST      0 - 40 U/L 18   ALT      0 - 45 U/L 25   Protein Total      6.0 - 8.0 g/dL 6.9   Albumin      3.5 - 5.0 g/dL 3.7   Bilirubin Total   "    0.0 - 1.0 mg/dL 0.8   GFR Estimate      >60 mL/min/1.73m2 90   Vitamin B12      213 - 816 pg/mL 425   Methylmalonic Acid      0.00 - 0.40 umol/L 0.17   TSH      0.30 - 5.00 uIU/mL 2.12   Ceruloplasmin      20 - 60 mg/dL 17 (L)     NM study  Impression:  A presynaptic dopaminergic deficit is not demonstrated.      DIAGNOSTICS    IMPRESSION/REPORT/PLAN  Parkinsonism, unspecified Parkinsonism type (H)-DaTscan negative  Hx of major depression  Weight loss  Low ceruloplasmin    This is a 71 year old male with masked facies, cogwheeling, decreased arm swing on the left side, history of depression with Abilify use and weight loss.  Patient does have signs and symptoms of parkinsonism though this could be brought on by Abilify use or could be secondary to depression.  His depression is not under good control.  The weight loss would be more related to depression.      Symptoms of parkinsonism are very subtle.  DaTscan was done with that was negative.  At this point we will hold off on trying Sinemet though I have discussed with him that the DaTscan is not 100% accurate and there is still small chance that he could have Parkinson's disease.  If his symptoms are worsening he can follow back for trial of Sinemet.    Blood work has not shown a cause for his tremors.  He did have low ceruloplasmin which is most likely unrelated to his tremors.  Would encourage him taking a multivitamin with copper to see if that helps.  He is currently doing that with no benefit.    Follow-up with psychiatry to see if getting off the Abilify can help him.  Family a lot of questions about depression which I tried to answer.    I can see him back on as-needed basis.    -    Multivitamin with copper    Return if symptoms worsen or fail to improve, for In-Clinic Visit.    Over 34 minutes were spent coordinating the care for the patient on the day of the encounter.  This includes previsit, during visit and post visit activities as documented  above.  Answering multiple questions.  Reviewing test results.  Counseling regarding prognosis and depression.  (Activities include but not inclusive of reviewing chart, reviewing outside records, reviewing labs and imaging study results as well as the images, patient visit time including getting history and exam,  use if applicable, review of test results with the patient and coming up with a plan in a shared model, counseling patient and family, education and answering patient questions, EMR , EMR diagnosis entry and problem list management, medication reconciliation and prescription management if applicable, paperwork if applicable, printing documents and documentation of the visit activities.)        Howard Contreras MD  Neurologist  [facility] Neurology [facility]  Tel:- [phone]    This note was dictated using voice recognition software.  Any grammatical or context distortions are unintentional and inherent to the software.

## 2021-10-19 NOTE — LETTER
10/19/2021         RE: Bala Hopper  [address]  [place] [address]        Dear Colleague,    Thank you for referring your patient, Bala Hopper, to the [facility] [place]. Please see a copy of my visit note below.    NEUROLOGY OUTPATIENT PROGRESS NOTE   Oct 19, 2021     CHIEF COMPLAINT/REASON FOR VISIT/REASON FOR CONSULT  Patient presents with:  Results: Discuss datScan     REASON FOR CONSULTATION- Tremors      HISTORY OF PRESENT ILLNESS  Bala Hopper is a 71 year old male seen today for evaluation of Parkinson's disease.  He reports that symptoms have been going on for the last 6 months and he has noted himself over the last 3 months.  He does suffer from depression which has been worse and he is lost a lot of weight in the last 4 months.  Does have some difficulty with his jaw/twitching of the jaw.  Reports some shaking of his right hand.  This is mainly when he is sitting and not doing anything.  He does have difficulty keeping up with people.  Occasionally will feel lightheaded.  Does feel stiffer when he tries to walk.  He has not been on any antipsychotics.  Is on Abilify currently for his depression.  His writing has become less legible compared to before.  He is right-handed.  He has not been eating and has been sleeping and most of the time because of his depression.  Denies any cognitive issues.      9/27/21  Patient returns today.  He reports a spell of dizziness.  Did throw up afterwards.  Tremor is about the same.  Remains on Abilify.  Is also on Prozac.  Was having a lot of rages before she he was on Abilify.  Has not seen a psychiatrist.  Did do the blood work though has not been able to do the DaTscan.    10/19/21  Patient returns today.  Symptoms are about the same.  Did have the DaTscan which came back negative.  Has not made an appointment with psychiatry to get off the Abilify.  Plans to do that later today/tomorrow.  Did have a spell where he was  trying to paint and reach up where his legs started shaking.  Was also feeling lightheaded.  Discussed that his symptoms were not completely suggestive of Parkinson's disease.    Previous history is reviewed and this is unchanged.    PAST MEDICAL/SURGICAL HISTORY  No past medical history on file.  There is no problem list on file for this patient.  Significant for diabetes, high cholesterol, depression, sleep disorder      FAMILY HISTORY  History reviewed. No pertinent family history.   Family history positive for severe Parkinson's and a great uncle    SOCIAL HISTORY  Social History     Tobacco Use     Smoking status: Never Smoker     Smokeless tobacco: Never Used   Substance Use Topics     Alcohol use: Not Currently     Drug use: Never       SYSTEMS REVIEW  Twelve-system ROS was done and other than the HPI this was negative except for balance and coordination problems, movement disorder, ringing in the ears, hearing loss, vision symptoms, sleepiness during the day, sleeping problems, anxiety, depression, weight loss, difficulty breathing during sleep  Reports no other new issues today.    MEDICATIONS  ARIPiprazole (ABILIFY) 2 MG tablet, Take 2 mg by mouth daily  ascorbic acid (VITAMIN C) 500 MG tablet, [ASCORBIC ACID (VITAMIN C) 500 MG TABLET] Take 1,000 mg by mouth daily.  aspirin 81 MG EC tablet, [ASPIRIN 81 MG EC TABLET] Take 81 mg by mouth daily.  atorvastatin (LIPITOR) 40 MG tablet, [ATORVASTATIN (LIPITOR) 40 MG TABLET] Take 40 mg by mouth daily.  finasteride (PROSCAR) 5 MG tablet, Take 5 mg by mouth daily  FLUoxetine (PROZAC) 20 MG capsule,   glipiZIDE (GLUCOTROL) 5 MG tablet, [GLIPIZIDE (GLUCOTROL) 5 MG TABLET] Take 5 mg by mouth 2 (two) times a day before meals.  Insulin Glargine-Lixisenatide (SOLIQUA SC), 26 Units  metFORMIN (GLUCOPHAGE) 1000 MG tablet, [METFORMIN (GLUCOPHAGE) 1000 MG TABLET] Take 1,000 mg by mouth 2 (two) times a day with meals.  tamsulosin (FLOMAX) 0.4 mg Cp24, [TAMSULOSIN (FLOMAX) 0.4  "MG CP24] Take 0.4 mg by mouth Daily after breakfast.  escitalopram oxalate (LEXAPRO) 20 MG tablet, [ESCITALOPRAM OXALATE (LEXAPRO) 20 MG TABLET] Take 20 mg by mouth daily. (Patient not taking: Reported on 9/17/2021)  FLUoxetine (PROZAC) 10 MG capsule, Take 10 mg by mouth daily (Patient not taking: Reported on 10/19/2021)    No current facility-administered medications on file prior to visit.       PHYSICAL EXAMINATION  VITALS: /65 (BP Location: Left arm, Patient Position: Sitting)   Pulse 85   Ht 1.88 m (6' 2\")   Wt 93 kg (205 lb)   BMI 26.32 kg/m    GENERAL: Healthy appearing, alert, no acute distress, normal habitus.  CARDIOVASCULAR: Extremities warm and well perfused. Pulses present.   NEUROLOGICAL:  Patient is awake and oriented to self, place and time.  Attention span is normal.  Memory is grossly intact.  Language is fluent and follows commands appropriately.  Appropriate fund of knowledge. Cranial nerves 2-12 are intact. Masked facies.  There is no pronator drift.  Motor exam shows 5/5 strength in all extremities.  Tone is symmetric bilaterally in upper and lower extremities with mild rigidity in upper extremities both sides.  (Previously reflexes are symmetric and 2+ in upper extremities and lower extremities. Sensory exam is grossly intact to light touch, pin prick and vibration.)  Finger to nose and heel to shin is without dysmetria.  Romberg is negative.  Gait is normal except decreased left arm swing and the patient is able to do tandem walk and walk on toes and heels with mild difficulty. (No significant improvement.)  Previously-no significant difficulty with concentric circles.     LABS  Component      Latest Ref Rng & Units 9/17/2021   Sodium      136 - 145 mmol/L 138   Potassium      3.5 - 5.0 mmol/L 4.5   Chloride      98 - 107 mmol/L 101   Carbon Dioxide      22 - 31 mmol/L 26   Anion Gap      5 - 18 mmol/L 11   Urea Nitrogen      8 - 28 mg/dL 17   Creatinine      0.70 - 1.30 mg/dL 0.81 "   Calcium      8.5 - 10.5 mg/dL 10.0   Glucose      70 - 125 mg/dL 88   Alkaline Phosphatase      45 - 120 U/L 76   AST      0 - 40 U/L 18   ALT      0 - 45 U/L 25   Protein Total      6.0 - 8.0 g/dL 6.9   Albumin      3.5 - 5.0 g/dL 3.7   Bilirubin Total      0.0 - 1.0 mg/dL 0.8   GFR Estimate      >60 mL/min/1.73m2 90   Vitamin B12      213 - 816 pg/mL 425   Methylmalonic Acid      0.00 - 0.40 umol/L 0.17   TSH      0.30 - 5.00 uIU/mL 2.12   Ceruloplasmin      20 - 60 mg/dL 17 (L)     NM study  Impression:  A presynaptic dopaminergic deficit is not demonstrated.      DIAGNOSTICS    IMPRESSION/REPORT/PLAN  Parkinsonism, unspecified Parkinsonism type (H)-DaTscan negative  Hx of major depression  Weight loss  Low ceruloplasmin    This is a 71 year old male with masked facies, cogwheeling, decreased arm swing on the left side, history of depression with Abilify use and weight loss.  Patient does have signs and symptoms of parkinsonism though this could be brought on by Abilify use or could be secondary to depression.  His depression is not under good control.  The weight loss would be more related to depression.      Symptoms of parkinsonism are very subtle.  DaTscan was done with that was negative.  At this point we will hold off on trying Sinemet though I have discussed with him that the DaTscan is not 100% accurate and there is still small chance that he could have Parkinson's disease.  If his symptoms are worsening he can follow back for trial of Sinemet.    Blood work has not shown a cause for his tremors.  He did have low ceruloplasmin which is most likely unrelated to his tremors.  Would encourage him taking a multivitamin with copper to see if that helps.  He is currently doing that with no benefit.    Follow-up with psychiatry to see if getting off the Abilify can help him.  Family a lot of questions about depression which I tried to answer.    I can see him back on as-needed basis.    -    Multivitamin with  copper    Return if symptoms worsen or fail to improve, for In-Clinic Visit.    Over 34 minutes were spent coordinating the care for the patient on the day of the encounter.  This includes previsit, during visit and post visit activities as documented above.  Answering multiple questions.  Reviewing test results.  Counseling regarding prognosis and depression.  (Activities include but not inclusive of reviewing chart, reviewing outside records, reviewing labs and imaging study results as well as the images, patient visit time including getting history and exam,  use if applicable, review of test results with the patient and coming up with a plan in a shared model, counseling patient and family, education and answering patient questions, EMR , EMR diagnosis entry and problem list management, medication reconciliation and prescription management if applicable, paperwork if applicable, printing documents and documentation of the visit activities.)        Howard Contreras MD  Neurologist  [facility] Neurology [facility]  Tel:- [phone]    This note was dictated using voice recognition software.  Any grammatical or context distortions are unintentional and inherent to the software.        Again, thank you for allowing me to participate in the care of your patient.        Sincerely,        Howard Contreras MD

## 2021-10-19 NOTE — NURSING NOTE
Chief Complaint   Patient presents with     Results     Discuss Gigi Han MA on 10/19/2021 at 11:23 AM

## 2021-11-04 ENCOUNTER — LAB REQUISITION (OUTPATIENT)
Dept: LAB | Facility: CLINIC | Age: 71
End: 2021-11-04

## 2021-11-04 DIAGNOSIS — E78.5 HYPERLIPIDEMIA, UNSPECIFIED: ICD-10-CM

## 2021-11-04 PROCEDURE — 82040 ASSAY OF SERUM ALBUMIN: CPT | Performed by: FAMILY MEDICINE

## 2021-11-04 PROCEDURE — 80061 LIPID PANEL: CPT | Performed by: FAMILY MEDICINE

## 2021-11-05 LAB
ALBUMIN SERPL-MCNC: 3.8 G/DL (ref 3.5–5)
ALP SERPL-CCNC: 75 U/L (ref 45–120)
ALT SERPL W P-5'-P-CCNC: 17 U/L (ref 0–45)
ANION GAP SERPL CALCULATED.3IONS-SCNC: 11 MMOL/L (ref 5–18)
AST SERPL W P-5'-P-CCNC: 14 U/L (ref 0–40)
BILIRUB SERPL-MCNC: 0.6 MG/DL (ref 0–1)
BUN SERPL-MCNC: 23 MG/DL (ref 8–28)
CALCIUM SERPL-MCNC: 9.7 MG/DL (ref 8.5–10.5)
CHLORIDE BLD-SCNC: 101 MMOL/L (ref 98–107)
CHOLEST SERPL-MCNC: 197 MG/DL
CO2 SERPL-SCNC: 27 MMOL/L (ref 22–31)
CREAT SERPL-MCNC: 0.85 MG/DL (ref 0.7–1.3)
GFR SERPL CREATININE-BSD FRML MDRD: 88 ML/MIN/1.73M2
GLUCOSE BLD-MCNC: 74 MG/DL (ref 70–125)
HDLC SERPL-MCNC: 39 MG/DL
LDLC SERPL CALC-MCNC: 140 MG/DL
POTASSIUM BLD-SCNC: 4.2 MMOL/L (ref 3.5–5)
PROT SERPL-MCNC: 6.5 G/DL (ref 6–8)
SODIUM SERPL-SCNC: 139 MMOL/L (ref 136–145)
TRIGL SERPL-MCNC: 92 MG/DL

## 2022-04-05 ENCOUNTER — OFFICE VISIT (OUTPATIENT)
Dept: CARDIOLOGY | Facility: CLINIC | Age: 72
End: 2022-04-05
Payer: COMMERCIAL

## 2022-04-05 VITALS
OXYGEN SATURATION: 97 % | BODY MASS INDEX: 25.81 KG/M2 | SYSTOLIC BLOOD PRESSURE: 110 MMHG | RESPIRATION RATE: 18 BRPM | DIASTOLIC BLOOD PRESSURE: 64 MMHG | WEIGHT: 201 LBS | HEART RATE: 80 BPM

## 2022-04-05 DIAGNOSIS — R68.89 DECREASED EXERCISE TOLERANCE: ICD-10-CM

## 2022-04-05 DIAGNOSIS — R06.09 DOE (DYSPNEA ON EXERTION): Primary | ICD-10-CM

## 2022-04-05 DIAGNOSIS — R55 PRE-SYNCOPE: ICD-10-CM

## 2022-04-05 LAB
ATRIAL RATE - MUSE: 70 BPM
DIASTOLIC BLOOD PRESSURE - MUSE: NORMAL MMHG
INTERPRETATION ECG - MUSE: NORMAL
P AXIS - MUSE: 63 DEGREES
PR INTERVAL - MUSE: 152 MS
QRS DURATION - MUSE: 94 MS
QT - MUSE: 410 MS
QTC - MUSE: 442 MS
R AXIS - MUSE: -22 DEGREES
SYSTOLIC BLOOD PRESSURE - MUSE: NORMAL MMHG
T AXIS - MUSE: 50 DEGREES
VENTRICULAR RATE- MUSE: 70 BPM

## 2022-04-05 PROCEDURE — 99204 OFFICE O/P NEW MOD 45 MIN: CPT | Performed by: INTERNAL MEDICINE

## 2022-04-05 PROCEDURE — 93000 ELECTROCARDIOGRAM COMPLETE: CPT | Performed by: INTERNAL MEDICINE

## 2022-04-05 NOTE — LETTER
"4/5/2022    Misha Dawson MD  [facility] [address]  [facility] [address]    RE: Bala ADDIS Ruchi       Dear Colleague,     I had the pleasure of seeing Bala Hopper in the [facility].         [facility]   [address], [place] [address]   [email or web address]   OFFICE: [phone]          Thank you Dr. Gifford ref. provider found for asking the [facility] team to participate in the care of your patient, Bala Hopper.     Impression and Plan     1.  Dyspnea on exertion. As noted below, Bala was recently seen by primary provider, Dr. Misha Dawson, on 16 March 2022.  Bala had been reporting shortness of breath with activity which had been gradually progressive.  He reported no subjective sweating or chest discomfort though did report \"panting\" with certain activities as well as some vomiting with activity such as doing stairs or shoveling and mowing.  It is possible his symptoms may be ischemically mediated.  He does have risk factors for development of coronary disease.  Plan:    Regadenoson on MRI to not only evaluate for ischemia, but assess for any other structural heart disease which could be a contributor (Bala states that he has had MRI studies in the past and does not have issues with claustrophobia).    2.  \"Dizziness\" and \"lightheadedness\".  Some features of Bala symptoms are somewhat suggestive of disequilibrium/inner ear/vertigo.  Some features also suggestive of orthostasis.  Does indicate that if he has not been drinking enough fluids his symptoms seem exacerbated.  Plan:      Record Lenin on MRI as per problem #1 which will allow us to assess for any cryptic structural heart disease.    We will obtain a 24-hour Holter monitor to exclude any rhythm disturbance though not strongly suspected.    3.   Dyslipidemia.    Continue statin therapy.    4.  Obstructive sleep apnea.  " "Bala does have a history of obstructive sleep apnea and uses CPAP.      35 minutes spent reviewing prior records (including documentation, laboratory studies, cardiac testing/imaging), interview with patient along with physical exam, planning, and subsequent documentation/crafting of note.       History of Present Illness    Once again I would like to thank you again for asking me to participate in the care of your patient, Bala Hopper.  As you know, but to reiterate for my own records, Bala Hopper is a 71 year old male recently seen by primary provider, Dr. Misha Dawson, on 16 March 2022.  Bala had been reporting shortness of breath with activity which had been gradually progressive.  He reported no subjective sweating or chest discomfort though did report \"panting\" with certain activities as well as some vomiting with activity such as doing stairs or shoveling and mowing.    On further interview, Bala confirms the aforementioned.  He reports subjective diminution in exercise tolerance that has been somewhat gradual and progressive.  In addition, he also has had some \"dizziness\" as well as \"lightheadedness\".  He recounts an episode where he had been adjusting the time a clock at his [employer].  He had his arms up over his head to manipulate the clock and became \"dizzy\".  During that episode he did not feel as though he was necessarily going to faint.  He has had other episodes, however, where he has felt lightheaded and near fainting.  He has not had any connie loss of consciousness.    Further review of systems is otherwise negative/noncontributory (medical record and 13 point review of systems reviewed as well and pertinent positives noted).       Cardiac Diagnostics     Twelve-lead ECG (personally reviewed) 5 April 2022: Sinus rhythm.  Normal ECG.     Physical Examination       /64 (BP Location: Left arm, Patient Position: Sitting, Cuff Size: Adult Regular)   Pulse 80   Resp 18   Wt " 91.2 kg (201 lb)   SpO2 97%   BMI 25.81 kg/m          Wt Readings from Last 3 Encounters:   04/05/22 91.2 kg (201 lb)   10/19/21 93 kg (205 lb)   09/27/21 93 kg (205 lb)     The patient is alert and oriented times three. Sclerae are anicteric. Mucosal membranes are moist. Jugular venous pressure is normal. No significant adenopathy/thyromegally appreciated. Lungs are clear with good expansion. On cardiovascular exam, the patient has a regular S1 and S2. Abdomen is soft and non-tender. Extremities reveal no clubbing, cyanosis, or edema.       Family History/Social History/Risk Factors   Patient does not smoke.  He denies early onset coronary disease in immediate family.       Medical History  Surgical History Family History Social History   Dyslipidemia  Diabetes mellitus.         Social History     Socioeconomic History     Marital status:      Spouse name: Not on file     Number of children: Not on file     Years of education: Not on file     Highest education level: Not on file   Occupational History     Not on file   Tobacco Use     Smoking status: Never Smoker     Smokeless tobacco: Never Used   Substance and Sexual Activity     Alcohol use: Not Currently     Drug use: Never     Sexual activity: Not on file   Other Topics Concern     Parent/sibling w/ CABG, MI or angioplasty before 65F 55M? Not Asked   Social History Narrative     Not on file     Social Determinants of Health     Financial Resource Strain: Not on file   Food Insecurity: Not on file   Transportation Needs: Not on file   Physical Activity: Not on file   Stress: Not on file   Social Connections: Not on file   Intimate Partner Violence: Not on file   Housing Stability: Not on file           Medications  Allergies   Current Outpatient Medications   Medication Sig Dispense Refill     aspirin 81 MG EC tablet [ASPIRIN 81 MG EC TABLET] Take 81 mg by mouth daily.       atorvastatin (LIPITOR) 40 MG tablet [ATORVASTATIN (LIPITOR) 40 MG TABLET] Take  40 mg by mouth daily.       Cholecalciferol (D3 ADULT PO) Take 4,000 Units by mouth daily       finasteride (PROSCAR) 5 MG tablet Take 5 mg by mouth daily       FLUoxetine 20 MG tablet Take 60 mg by mouth daily        glipiZIDE (GLUCOTROL) 5 MG tablet [GLIPIZIDE (GLUCOTROL) 5 MG TABLET] Take 5 mg by mouth 2 (two) times a day before meals.       Insulin Glargine-Lixisenatide (SOLIQUA SC) Inject 0-25 Units Subcutaneous daily 0-25 Units depending on blood sugar reading       metFORMIN (GLUCOPHAGE) 1000 MG tablet [METFORMIN (GLUCOPHAGE) 1000 MG TABLET] Take 1,000 mg by mouth 2 (two) times a day with meals.       tamsulosin (FLOMAX) 0.4 mg Cp24 [TAMSULOSIN (FLOMAX) 0.4 MG CP24] Take 0.4 mg by mouth Daily after breakfast.       UNABLE TO FIND Take by mouth daily MEDICATION NAME:     Plexus X Factor - 1 capsul Daily  Plexus Probio 5 - 1 capsule Daily   Plexus Bio Cleanse - 1 capsule daily       ARIPiprazole (ABILIFY) 2 MG tablet Take 2 mg by mouth daily (Patient not taking: Reported on 4/5/2022)       ascorbic acid (VITAMIN C) 500 MG tablet [ASCORBIC ACID (VITAMIN C) 500 MG TABLET] Take 1,000 mg by mouth daily. (Patient not taking: Reported on 4/5/2022)       escitalopram oxalate (LEXAPRO) 20 MG tablet [ESCITALOPRAM OXALATE (LEXAPRO) 20 MG TABLET] Take 20 mg by mouth daily. (Patient not taking: Reported on 9/17/2021)       FLUoxetine (PROZAC) 10 MG capsule Take 10 mg by mouth daily (Patient not taking: Reported on 10/19/2021)       No Known Allergies       Lab Results    Chemistry/lipid CBC Cardiac Enzymes/BNP/TSH/INR   Recent Labs   Lab Test 11/04/21 0914   CHOL 197   HDL 39*   *   TRIG 92     Recent Labs   Lab Test 11/04/21  0914 09/29/20  1653 08/30/19  1152   * 83 76     Recent Labs   Lab Test 11/04/21 0914      POTASSIUM 4.2   CHLORIDE 101   CO2 27   GLC 74   BUN 23   CR 0.85   GFRESTIMATED 88   EVELYNE 9.7     Recent Labs   Lab Test 11/04/21  0914 09/17/21  1228 09/06/18  0749   CR 0.85 0.81 0.85      No results for input(s): A1C in the last 31775 hours.       Recent Labs   Lab Test 09/06/18  0749   WBC 6.4   HGB 13.8*   HCT 41.7   MCV 93        Recent Labs   Lab Test 09/06/18  0749   HGB 13.8*    No results for input(s): TROPONINI in the last 05785 hours.  No results for input(s): BNP, NTBNPI, NTBNP in the last 71081 hours.  Recent Labs   Lab Test 09/17/21  1228   TSH 2.12     No results for input(s): INR in the last 79288 hours.       Medications  Allergies   Current Outpatient Medications   Medication Sig Dispense Refill     aspirin 81 MG EC tablet [ASPIRIN 81 MG EC TABLET] Take 81 mg by mouth daily.       atorvastatin (LIPITOR) 40 MG tablet [ATORVASTATIN (LIPITOR) 40 MG TABLET] Take 40 mg by mouth daily.       Cholecalciferol (D3 ADULT PO) Take 4,000 Units by mouth daily       finasteride (PROSCAR) 5 MG tablet Take 5 mg by mouth daily       FLUoxetine 20 MG tablet Take 60 mg by mouth daily        glipiZIDE (GLUCOTROL) 5 MG tablet [GLIPIZIDE (GLUCOTROL) 5 MG TABLET] Take 5 mg by mouth 2 (two) times a day before meals.       Insulin Glargine-Lixisenatide (SOLIQUA SC) Inject 0-25 Units Subcutaneous daily 0-25 Units depending on blood sugar reading       metFORMIN (GLUCOPHAGE) 1000 MG tablet [METFORMIN (GLUCOPHAGE) 1000 MG TABLET] Take 1,000 mg by mouth 2 (two) times a day with meals.       tamsulosin (FLOMAX) 0.4 mg Cp24 [TAMSULOSIN (FLOMAX) 0.4 MG CP24] Take 0.4 mg by mouth Daily after breakfast.       UNABLE TO FIND Take by mouth daily MEDICATION NAME:     Plexus X Factor - 1 capsul Daily  Plexus Probio 5 - 1 capsule Daily   Plexus Bio Cleanse - 1 capsule daily       ARIPiprazole (ABILIFY) 2 MG tablet Take 2 mg by mouth daily (Patient not taking: Reported on 4/5/2022)       ascorbic acid (VITAMIN C) 500 MG tablet [ASCORBIC ACID (VITAMIN C) 500 MG TABLET] Take 1,000 mg by mouth daily. (Patient not taking: Reported on 4/5/2022)       escitalopram oxalate (LEXAPRO) 20 MG tablet [ESCITALOPRAM OXALATE  (LEXAPRO) 20 MG TABLET] Take 20 mg by mouth daily. (Patient not taking: Reported on 9/17/2021)       FLUoxetine (PROZAC) 10 MG capsule Take 10 mg by mouth daily (Patient not taking: Reported on 10/19/2021)        No Known Allergies     Lab Results   Lab Results   Component Value Date     11/04/2021    CO2 27 11/04/2021    BUN 23 11/04/2021     Lab Results   Component Value Date    WBC 6.4 09/06/2018    HGB 13.8 09/06/2018    HCT 41.7 09/06/2018    MCV 93 09/06/2018     09/06/2018     Lab Results   Component Value Date    CHOL 197 11/04/2021    TRIG 92 11/04/2021    HDL 39 11/04/2021     Lab Results   Component Value Date    TSH 2.12 09/17/2021             Thank you for allowing me to participate in the care of your patient.    Sincerely,   Sreedhar Grande MD   [facility]  cc: No referring provider defined for this encounter.

## 2022-04-06 NOTE — PROGRESS NOTES
"       [facility]   [address], [place] [address]   [email or web address]   OFFICE: [phone]          Thank you Dr. Gifford ref. provider found for asking the [facility] team to participate in the care of your patient, Bala Hopper.     Impression and Plan     1.  Dyspnea on exertion. As noted below, Bala was recently seen by primary provider, Dr. Misha Dawson, on 16 March 2022.  Bala had been reporting shortness of breath with activity which had been gradually progressive.  He reported no subjective sweating or chest discomfort though did report \"panting\" with certain activities as well as some vomiting with activity such as doing stairs or shoveling and mowing.  It is possible his symptoms may be ischemically mediated.  He does have risk factors for development of coronary disease.  Plan:    Regadenoson on MRI to not only evaluate for ischemia, but assess for any other structural heart disease which could be a contributor (Bala states that he has had MRI studies in the past and does not have issues with claustrophobia).    2.  \"Dizziness\" and \"lightheadedness\".  Some features of Blaa symptoms are somewhat suggestive of disequilibrium/inner ear/vertigo.  Some features also suggestive of orthostasis.  Does indicate that if he has not been drinking enough fluids his symptoms seem exacerbated.  Plan:      Record Lenin on MRI as per problem #1 which will allow us to assess for any cryptic structural heart disease.    We will obtain a 24-hour Holter monitor to exclude any rhythm disturbance though not strongly suspected.    3.   Dyslipidemia.    Continue statin therapy.    4.  Obstructive sleep apnea.  Bala does have a history of obstructive sleep apnea and uses CPAP.      35 minutes spent reviewing prior records (including documentation, laboratory studies, cardiac testing/imaging), interview with patient along with physical exam, " "planning, and subsequent documentation/crafting of note.       History of Present Illness    Once again I would like to thank you again for asking me to participate in the care of your patient, Bala Hopper.  As you know, but to reiterate for my own records, Bala Hopper is a 71 year old male recently seen by primary provider, Dr. Misha Dawson, on 16 March 2022.  Bala had been reporting shortness of breath with activity which had been gradually progressive.  He reported no subjective sweating or chest discomfort though did report \"panting\" with certain activities as well as some vomiting with activity such as doing stairs or shoveling and mowing.    On further interview, Bala confirms the aforementioned.  He reports subjective diminution in exercise tolerance that has been somewhat gradual and progressive.  In addition, he also has had some \"dizziness\" as well as \"lightheadedness\".  He recounts an episode where he had been adjusting the time a clock at his [employer].  He had his arms up over his head to manipulate the clock and became \"dizzy\".  During that episode he did not feel as though he was necessarily going to faint.  He has had other episodes, however, where he has felt lightheaded and near fainting.  He has not had any connie loss of consciousness.    Further review of systems is otherwise negative/noncontributory (medical record and 13 point review of systems reviewed as well and pertinent positives noted).       Cardiac Diagnostics     Twelve-lead ECG (personally reviewed) 5 April 2022: Sinus rhythm.  Normal ECG.     Physical Examination       /64 (BP Location: Left arm, Patient Position: Sitting, Cuff Size: Adult Regular)   Pulse 80   Resp 18   Wt 91.2 kg (201 lb)   SpO2 97%   BMI 25.81 kg/m          Wt Readings from Last 3 Encounters:   04/05/22 91.2 kg (201 lb)   10/19/21 93 kg (205 lb)   09/27/21 93 kg (205 lb)     The patient is alert and oriented times three. Sclerae are " anicteric. Mucosal membranes are moist. Jugular venous pressure is normal. No significant adenopathy/thyromegally appreciated. Lungs are clear with good expansion. On cardiovascular exam, the patient has a regular S1 and S2. Abdomen is soft and non-tender. Extremities reveal no clubbing, cyanosis, or edema.       Family History/Social History/Risk Factors   Patient does not smoke.  He denies early onset coronary disease in immediate family.       Medical History  Surgical History Family History Social History   Dyslipidemia  Diabetes mellitus.         Social History     Socioeconomic History     Marital status:      Spouse name: Not on file     Number of children: Not on file     Years of education: Not on file     Highest education level: Not on file   Occupational History     Not on file   Tobacco Use     Smoking status: Never Smoker     Smokeless tobacco: Never Used   Substance and Sexual Activity     Alcohol use: Not Currently     Drug use: Never     Sexual activity: Not on file   Other Topics Concern     Parent/sibling w/ CABG, MI or angioplasty before 65F 55M? Not Asked   Social History Narrative     Not on file     Social Determinants of Health     Financial Resource Strain: Not on file   Food Insecurity: Not on file   Transportation Needs: Not on file   Physical Activity: Not on file   Stress: Not on file   Social Connections: Not on file   Intimate Partner Violence: Not on file   Housing Stability: Not on file           Medications  Allergies   Current Outpatient Medications   Medication Sig Dispense Refill     aspirin 81 MG EC tablet [ASPIRIN 81 MG EC TABLET] Take 81 mg by mouth daily.       atorvastatin (LIPITOR) 40 MG tablet [ATORVASTATIN (LIPITOR) 40 MG TABLET] Take 40 mg by mouth daily.       Cholecalciferol (D3 ADULT PO) Take 4,000 Units by mouth daily       finasteride (PROSCAR) 5 MG tablet Take 5 mg by mouth daily       FLUoxetine 20 MG tablet Take 60 mg by mouth daily        glipiZIDE  (GLUCOTROL) 5 MG tablet [GLIPIZIDE (GLUCOTROL) 5 MG TABLET] Take 5 mg by mouth 2 (two) times a day before meals.       Insulin Glargine-Lixisenatide (SOLIQUA SC) Inject 0-25 Units Subcutaneous daily 0-25 Units depending on blood sugar reading       metFORMIN (GLUCOPHAGE) 1000 MG tablet [METFORMIN (GLUCOPHAGE) 1000 MG TABLET] Take 1,000 mg by mouth 2 (two) times a day with meals.       tamsulosin (FLOMAX) 0.4 mg Cp24 [TAMSULOSIN (FLOMAX) 0.4 MG CP24] Take 0.4 mg by mouth Daily after breakfast.       UNABLE TO FIND Take by mouth daily MEDICATION NAME:     Plexus X Factor - 1 capsul Daily  Plexus Probio 5 - 1 capsule Daily   Plexus Bio Cleanse - 1 capsule daily       ARIPiprazole (ABILIFY) 2 MG tablet Take 2 mg by mouth daily (Patient not taking: Reported on 4/5/2022)       ascorbic acid (VITAMIN C) 500 MG tablet [ASCORBIC ACID (VITAMIN C) 500 MG TABLET] Take 1,000 mg by mouth daily. (Patient not taking: Reported on 4/5/2022)       escitalopram oxalate (LEXAPRO) 20 MG tablet [ESCITALOPRAM OXALATE (LEXAPRO) 20 MG TABLET] Take 20 mg by mouth daily. (Patient not taking: Reported on 9/17/2021)       FLUoxetine (PROZAC) 10 MG capsule Take 10 mg by mouth daily (Patient not taking: Reported on 10/19/2021)       No Known Allergies       Lab Results    Chemistry/lipid CBC Cardiac Enzymes/BNP/TSH/INR   Recent Labs   Lab Test 11/04/21  0914   CHOL 197   HDL 39*   *   TRIG 92     Recent Labs   Lab Test 11/04/21  0914 09/29/20  1653 08/30/19  1152   * 83 76     Recent Labs   Lab Test 11/04/21  0914      POTASSIUM 4.2   CHLORIDE 101   CO2 27   GLC 74   BUN 23   CR 0.85   GFRESTIMATED 88   EVELYNE 9.7     Recent Labs   Lab Test 11/04/21  0914 09/17/21  1228 09/06/18  0749   CR 0.85 0.81 0.85     No results for input(s): A1C in the last 09213 hours.       Recent Labs   Lab Test 09/06/18  0749   WBC 6.4   HGB 13.8*   HCT 41.7   MCV 93        Recent Labs   Lab Test 09/06/18  0749   HGB 13.8*    No results for  input(s): TROPONINI in the last 47295 hours.  No results for input(s): BNP, NTBNPI, NTBNP in the last 29599 hours.  Recent Labs   Lab Test 09/17/21  1228   TSH 2.12     No results for input(s): INR in the last 15890 hours.       Medications  Allergies   Current Outpatient Medications   Medication Sig Dispense Refill     aspirin 81 MG EC tablet [ASPIRIN 81 MG EC TABLET] Take 81 mg by mouth daily.       atorvastatin (LIPITOR) 40 MG tablet [ATORVASTATIN (LIPITOR) 40 MG TABLET] Take 40 mg by mouth daily.       Cholecalciferol (D3 ADULT PO) Take 4,000 Units by mouth daily       finasteride (PROSCAR) 5 MG tablet Take 5 mg by mouth daily       FLUoxetine 20 MG tablet Take 60 mg by mouth daily        glipiZIDE (GLUCOTROL) 5 MG tablet [GLIPIZIDE (GLUCOTROL) 5 MG TABLET] Take 5 mg by mouth 2 (two) times a day before meals.       Insulin Glargine-Lixisenatide (SOLIQUA SC) Inject 0-25 Units Subcutaneous daily 0-25 Units depending on blood sugar reading       metFORMIN (GLUCOPHAGE) 1000 MG tablet [METFORMIN (GLUCOPHAGE) 1000 MG TABLET] Take 1,000 mg by mouth 2 (two) times a day with meals.       tamsulosin (FLOMAX) 0.4 mg Cp24 [TAMSULOSIN (FLOMAX) 0.4 MG CP24] Take 0.4 mg by mouth Daily after breakfast.       UNABLE TO FIND Take by mouth daily MEDICATION NAME:     Plexus X Factor - 1 capsul Daily  Plexus Probio 5 - 1 capsule Daily   Plexus Bio Cleanse - 1 capsule daily       ARIPiprazole (ABILIFY) 2 MG tablet Take 2 mg by mouth daily (Patient not taking: Reported on 4/5/2022)       ascorbic acid (VITAMIN C) 500 MG tablet [ASCORBIC ACID (VITAMIN C) 500 MG TABLET] Take 1,000 mg by mouth daily. (Patient not taking: Reported on 4/5/2022)       escitalopram oxalate (LEXAPRO) 20 MG tablet [ESCITALOPRAM OXALATE (LEXAPRO) 20 MG TABLET] Take 20 mg by mouth daily. (Patient not taking: Reported on 9/17/2021)       FLUoxetine (PROZAC) 10 MG capsule Take 10 mg by mouth daily (Patient not taking: Reported on 10/19/2021)        No Known  Allergies     Lab Results   Lab Results   Component Value Date     11/04/2021    CO2 27 11/04/2021    BUN 23 11/04/2021     Lab Results   Component Value Date    WBC 6.4 09/06/2018    HGB 13.8 09/06/2018    HCT 41.7 09/06/2018    MCV 93 09/06/2018     09/06/2018     Lab Results   Component Value Date    CHOL 197 11/04/2021    TRIG 92 11/04/2021    HDL 39 11/04/2021     Lab Results   Component Value Date    TSH 2.12 09/17/2021

## 2022-04-19 ENCOUNTER — HOSPITAL ENCOUNTER (OUTPATIENT)
Dept: CARDIOLOGY | Facility: HOSPITAL | Age: 72
Discharge: HOME | End: 2022-04-19
Attending: INTERNAL MEDICINE | Admitting: INTERNAL MEDICINE
Payer: COMMERCIAL

## 2022-04-19 DIAGNOSIS — R55 PRE-SYNCOPE: ICD-10-CM

## 2022-04-19 DIAGNOSIS — R68.89 DECREASED EXERCISE TOLERANCE: ICD-10-CM

## 2022-04-19 DIAGNOSIS — R06.09 DOE (DYSPNEA ON EXERTION): ICD-10-CM

## 2022-04-19 PROCEDURE — 93225 XTRNL ECG REC<48 HRS REC: CPT

## 2022-04-21 PROCEDURE — 93227 XTRNL ECG REC<48 HR R&I: CPT | Performed by: INTERNAL MEDICINE

## 2022-07-26 DIAGNOSIS — R68.89 DECREASED EXERCISE TOLERANCE: ICD-10-CM

## 2022-07-26 DIAGNOSIS — R06.09 DOE (DYSPNEA ON EXERTION): Primary | ICD-10-CM

## 2022-07-26 DIAGNOSIS — R55 PRE-SYNCOPE: ICD-10-CM

## 2022-07-27 ENCOUNTER — HOSPITAL ENCOUNTER (OUTPATIENT)
Dept: MRI IMAGING | Facility: HOSPITAL | Age: 72
Discharge: HOME | End: 2022-07-27
Attending: INTERNAL MEDICINE
Payer: COMMERCIAL

## 2022-07-27 VITALS — DIASTOLIC BLOOD PRESSURE: 62 MMHG | OXYGEN SATURATION: 98 % | HEART RATE: 74 BPM | SYSTOLIC BLOOD PRESSURE: 116 MMHG

## 2022-07-27 DIAGNOSIS — R68.89 DECREASED EXERCISE TOLERANCE: ICD-10-CM

## 2022-07-27 DIAGNOSIS — R55 PRE-SYNCOPE: ICD-10-CM

## 2022-07-27 DIAGNOSIS — R06.09 DOE (DYSPNEA ON EXERTION): ICD-10-CM

## 2022-07-27 LAB
ATRIAL RATE - MUSE: 67 BPM
DIASTOLIC BLOOD PRESSURE - MUSE: NORMAL MMHG
INTERPRETATION ECG - MUSE: NORMAL
P AXIS - MUSE: 70 DEGREES
PR INTERVAL - MUSE: 160 MS
QRS DURATION - MUSE: 102 MS
QT - MUSE: 424 MS
QTC - MUSE: 448 MS
R AXIS - MUSE: -23 DEGREES
SYSTOLIC BLOOD PRESSURE - MUSE: NORMAL MMHG
T AXIS - MUSE: 37 DEGREES
VENTRICULAR RATE- MUSE: 67 BPM

## 2022-07-27 PROCEDURE — 250N000011 HC RX IP 250 OP 636: Performed by: INTERNAL MEDICINE

## 2022-07-27 PROCEDURE — 93016 CV STRESS TEST SUPVJ ONLY: CPT | Performed by: INTERNAL MEDICINE

## 2022-07-27 PROCEDURE — A9585 GADOBUTROL INJECTION: HCPCS | Performed by: INTERNAL MEDICINE

## 2022-07-27 PROCEDURE — 999N000122 MR MYOCARDIUM  OVERREAD

## 2022-07-27 PROCEDURE — 93005 ELECTROCARDIOGRAM TRACING: CPT

## 2022-07-27 PROCEDURE — 75563 CARD MRI W/STRESS IMG & DYE: CPT | Mod: 26 | Performed by: GENERAL ACUTE CARE HOSPITAL

## 2022-07-27 PROCEDURE — 93010 ELECTROCARDIOGRAM REPORT: CPT | Performed by: GENERAL ACUTE CARE HOSPITAL

## 2022-07-27 PROCEDURE — 255N000002 HC RX 255 OP 636: Performed by: INTERNAL MEDICINE

## 2022-07-27 PROCEDURE — 93018 CV STRESS TEST I&R ONLY: CPT | Performed by: GENERAL ACUTE CARE HOSPITAL

## 2022-07-27 PROCEDURE — 75563 CARD MRI W/STRESS IMG & DYE: CPT

## 2022-07-27 RX ORDER — ALBUTEROL SULFATE 0.83 MG/ML
2.5 SOLUTION RESPIRATORY (INHALATION)
Status: DISCONTINUED | OUTPATIENT
Start: 2022-07-27 | End: 2022-07-27 | Stop reason: HOSPADM

## 2022-07-27 RX ORDER — AMINOPHYLLINE 25 MG/ML
50 INJECTION, SOLUTION INTRAVENOUS
Status: DISCONTINUED | OUTPATIENT
Start: 2022-07-27 | End: 2022-07-27 | Stop reason: HOSPADM

## 2022-07-27 RX ORDER — CAFFEINE CITRATE 20 MG/ML
60 SOLUTION INTRAVENOUS
Status: DISCONTINUED | OUTPATIENT
Start: 2022-07-27 | End: 2022-07-27 | Stop reason: HOSPADM

## 2022-07-27 RX ORDER — CAFFEINE 200 MG
200 TABLET ORAL
Status: DISCONTINUED | OUTPATIENT
Start: 2022-07-27 | End: 2022-07-27 | Stop reason: HOSPADM

## 2022-07-27 RX ORDER — REGADENOSON 0.08 MG/ML
0.4 INJECTION, SOLUTION INTRAVENOUS ONCE
Status: COMPLETED | OUTPATIENT
Start: 2022-07-27 | End: 2022-07-27

## 2022-07-27 RX ORDER — GADOBUTROL 604.72 MG/ML
20 INJECTION INTRAVENOUS ONCE
Status: COMPLETED | OUTPATIENT
Start: 2022-07-27 | End: 2022-07-27

## 2022-07-27 RX ADMIN — REGADENOSON 0.4 MG: 0.08 INJECTION, SOLUTION INTRAVENOUS at 10:30

## 2022-07-27 RX ADMIN — GADOBUTROL 20 ML: 604.72 INJECTION INTRAVENOUS at 10:50

## 2022-08-02 ENCOUNTER — TELEPHONE (OUTPATIENT)
Dept: CARDIOLOGY | Facility: CLINIC | Age: 72
End: 2022-08-02

## 2022-08-02 DIAGNOSIS — R94.39 ABNORMAL CARDIOVASCULAR STRESS TEST: ICD-10-CM

## 2022-08-02 DIAGNOSIS — R06.09 DOE (DYSPNEA ON EXERTION): Primary | ICD-10-CM

## 2022-08-02 NOTE — TELEPHONE ENCOUNTER
----- Message from Sreedhar Grande MD sent at 8/2/2022  2:54 PM CDT -----  Regadenoson stress cardiac MRI was reviewed.  This does suggest a small area of reversibility in the mid to distal anteroseptal segment suggesting possible reduced blood flow to that region.  Although the test is only mildly abnormal, given the patient's described symptoms; feel it would be prudent to pursue further and definitively.  I would advocate direct angiography +/- PCI particularly given the symptoms he reported.  If patient is willing, lets go ahead and set that up.  Thanks.

## 2022-08-02 NOTE — TELEPHONE ENCOUNTER
Reached out to patient with results and recommendations. Patient verbalized understanding and is agreeable with plan.  All questions answered to patient's satisfaction. Informed patient a  will contact him to arrange procedure and then he will be contacted with procedure education.

## 2022-08-03 ENCOUNTER — TRANSFERRED RECORDS (OUTPATIENT)
Dept: HEALTH INFORMATION MANAGEMENT | Facility: CLINIC | Age: 72
End: 2022-08-03

## 2022-08-03 ENCOUNTER — LAB REQUISITION (OUTPATIENT)
Dept: LAB | Facility: CLINIC | Age: 72
End: 2022-08-03

## 2022-08-03 DIAGNOSIS — Z01.818 ENCOUNTER FOR OTHER PREPROCEDURAL EXAMINATION: ICD-10-CM

## 2022-08-03 LAB
ALBUMIN SERPL BCG-MCNC: 3.9 G/DL (ref 3.5–5.2)
ALP SERPL-CCNC: 88 U/L (ref 40–129)
ALT SERPL W P-5'-P-CCNC: 25 U/L (ref 10–50)
ANION GAP SERPL CALCULATED.3IONS-SCNC: 12 MMOL/L (ref 7–15)
AST SERPL W P-5'-P-CCNC: 18 U/L (ref 10–50)
BILIRUB SERPL-MCNC: 0.5 MG/DL
BUN SERPL-MCNC: 14.4 MG/DL (ref 8–23)
CALCIUM SERPL-MCNC: 9.5 MG/DL (ref 8.8–10.2)
CHLORIDE SERPL-SCNC: 101 MMOL/L (ref 98–107)
CREAT SERPL-MCNC: 0.93 MG/DL (ref 0.67–1.17)
DEPRECATED HCO3 PLAS-SCNC: 25 MMOL/L (ref 22–29)
GFR SERPL CREATININE-BSD FRML MDRD: 88 ML/MIN/1.73M2
GLUCOSE SERPL-MCNC: 140 MG/DL (ref 70–99)
PHQ9 SCORE: 4
POTASSIUM SERPL-SCNC: 4.9 MMOL/L (ref 3.4–5.3)
PROT SERPL-MCNC: 6.3 G/DL (ref 6.4–8.3)
SODIUM SERPL-SCNC: 138 MMOL/L (ref 136–145)

## 2022-08-03 PROCEDURE — 82040 ASSAY OF SERUM ALBUMIN: CPT | Performed by: PHYSICIAN ASSISTANT

## 2022-08-03 PROCEDURE — 80053 COMPREHEN METABOLIC PANEL: CPT | Performed by: PHYSICIAN ASSISTANT

## 2022-08-04 ENCOUNTER — PREP FOR PROCEDURE (OUTPATIENT)
Dept: CARDIOLOGY | Facility: CLINIC | Age: 72
End: 2022-08-04

## 2022-08-04 ENCOUNTER — TELEPHONE (OUTPATIENT)
Dept: CARDIOLOGY | Facility: CLINIC | Age: 72
End: 2022-08-04

## 2022-08-04 DIAGNOSIS — R68.89 DECREASED EXERCISE TOLERANCE: ICD-10-CM

## 2022-08-04 DIAGNOSIS — R06.09 DOE (DYSPNEA ON EXERTION): Primary | ICD-10-CM

## 2022-08-04 DIAGNOSIS — R55 PRE-SYNCOPE: ICD-10-CM

## 2022-08-04 DIAGNOSIS — R94.39 ABNORMAL CARDIOVASCULAR STRESS TEST: ICD-10-CM

## 2022-08-04 RX ORDER — DIAZEPAM 5 MG
5 TABLET ORAL
Status: CANCELLED | OUTPATIENT
Start: 2022-08-10

## 2022-08-04 RX ORDER — DEXTROSE MONOHYDRATE 25 G/50ML
25-50 INJECTION, SOLUTION INTRAVENOUS
Status: CANCELLED | OUTPATIENT
Start: 2022-08-10

## 2022-08-04 RX ORDER — LIDOCAINE 40 MG/G
CREAM TOPICAL
Status: CANCELLED | OUTPATIENT
Start: 2022-08-04

## 2022-08-04 RX ORDER — NICOTINE POLACRILEX 4 MG
15-30 LOZENGE BUCCAL
Status: CANCELLED | OUTPATIENT
Start: 2022-08-10

## 2022-08-04 RX ORDER — ASPIRIN 325 MG
325 TABLET ORAL ONCE
Status: CANCELLED | OUTPATIENT
Start: 2022-08-10 | End: 2022-08-04

## 2022-08-04 RX ORDER — SODIUM CHLORIDE 9 MG/ML
INJECTION, SOLUTION INTRAVENOUS CONTINUOUS
Status: CANCELLED | OUTPATIENT
Start: 2022-08-10

## 2022-08-04 RX ORDER — FENTANYL CITRATE 50 UG/ML
25 INJECTION, SOLUTION INTRAMUSCULAR; INTRAVENOUS
Status: CANCELLED | OUTPATIENT
Start: 2022-08-10

## 2022-08-04 RX ORDER — ASPIRIN 81 MG/1
243 TABLET, CHEWABLE ORAL ONCE
Status: CANCELLED | OUTPATIENT
Start: 2022-08-10

## 2022-08-04 NOTE — TELEPHONE ENCOUNTER
----- Message from Jason Mckinley sent at 8/3/2022  9:16 AM CDT -----  Regarding: RE: COLE ordering  [facility]ORS POSS PCI WITH EMG     630AM ADMIT ON 8/10 - OFFERED SOONER, BUT PT DECLINED)     H&P: PMD - PT WILL SCHEDULE     ALERTS: DIABETIC     AT HOME COVID TESTING TO BE COMPLETED BY PT 2 DAYS PRIOR     THANKS!   -JASON  ----- Message -----  From: Alka Humphries RN  Sent: 8/2/2022   3:24 PM CDT  To: [facility] Procedure  Pool - [facility]  Subject: COLE ordering                                     Case Type: CA P.PCI  Ordering Provider: COLE  H&P: needed by PCP - patient will need to be notified  Alerts: diabetic  Additional Info/Urgency: next available  COVID Testing (Antigen, PCR or Not Needed): antigen - please notify patient.

## 2022-08-04 NOTE — TELEPHONE ENCOUNTER
Bala Hopper  [address]  [facility] [address]  [phone] (home) [phone] (work)    PCP:  Misha Dawson  H&P completed by:  Dr. Misah Dawson 8/3/2022  Admit date 8/10/2022 Arrival time:  06:30  Anticoagulation:  NA  Previous PCI: No  Bypass Grafts: No  Renal Issues: No  Diabetic?: Yes  Device?: No    Ambulation status: no assistance required    Reason for Visit:  Telephone call to discuss pre-procedure education in preparation for: Coronary Angiogram with Possible PCI    Procedure Prep:  EKG results obtained, dated: To be completed on day of cath lab procedure  Hemogram results obtained: To be completed on day of cath lab procedure  Basic Metabolic Panel results obtained: To be completed on day of cath lab procedure  Pertinent cardiac test results: Stress MRI 7/27/2022  COVID-19 test results obtained: Completed at External Facility - Facility name and location (home 8/8/2022)    Patient Education    1. Your arrival time is 06:30.  Location is [facility] - [address] [place] [address] - Main Entrance of the Hospital  2. Please plan on being at the hospital all day.  3. At any time, emergencies and/or urgent cases may come up which could delay the start of your procedure.    COVID Testing Instructions  *Mandatory COVID Testing:   ALL Patients will need to complete a COVID test no sooner than 4 days prior to their procedure (regardless of vaccination status).      To schedule COVID testing Please call [phone]    If you want to complete this at an outside facility please call them to find out if they will have the results within the appropriate time frame and their fax number.  You will need to provide us with that information so we can send the order.    The facility completing the test will need to fax the results to [phone]    If you are running into and issues please call us.     Pre-procedure instructions  Take your temperature in the morning  prior to coming in.  If your temperature is 100 F please call [facility] [phone] and notify them.  If you do not have access to a thermometer at home, please come in for testing.  If you are running a temperature your procedure may be rescheduled.  Patient instructed to not Eat or Drink after midnight.  Patient instructed to shower the evening before or the morning of the procedure.  Patient instructed to arrange for transportation home following procedure from a responsible family member or friend. No driving for at least 24 hours.  Patient instructed to have a responsible adult with them for 24 hours post-procedure.  Post-procedure follow up process.  Conscious sedation discussed.      Pre-procedure medication instructions.  Continue medications as scheduled, with a small amount of water on the day of the procedure unless indicated. (NO Diabetic Medications or Blood Thinners)  Pt instructed not to consume Alcohol, Tobacco, Caffeine, or Carbonated beverages 12 hours prior to procedure.  Patient instructed to take 325 mg of Aspirin the night before and morning of procedure: Yes  Other medication: instructed to only take 20% of soliqua as instructed by PCP a.m. of the procedure.    Patient's wife, Marylou, will be the  the day of the procedure and will stay with him for 24 hours after.              Diabetic Medication Instructions  ? DO NOT take any oral diabetic medication, short-acting diabetes medications/insulin, humalog or regular insulin the morning of your test  ? Take   dose of long-acting insulin (Lantus, Levemir) the day of your test  ? Hold Oral Diabetic on the day of the procedure and for 48 hours after IV contrast given  ? Remember to  bring your glucometer and insulin with you to take after your test if needed              Patient states understanding of procedure and agrees to proceed.    *PATIENTS RECORDS AVAILABLE IN [facility] UNLESS OTHERWISE INDICATED*      There is no problem list on file  for this patient.      Current Outpatient Medications   Medication Sig Dispense Refill     aspirin 81 MG EC tablet [ASPIRIN 81 MG EC TABLET] Take 81 mg by mouth daily.       atorvastatin (LIPITOR) 40 MG tablet [ATORVASTATIN (LIPITOR) 40 MG TABLET] Take 40 mg by mouth daily.       Cholecalciferol (D3 ADULT PO) Take 4,000 Units by mouth daily       finasteride (PROSCAR) 5 MG tablet Take 5 mg by mouth daily       FLUoxetine 20 MG tablet Take 60 mg by mouth daily        glipiZIDE (GLUCOTROL) 5 MG tablet [GLIPIZIDE (GLUCOTROL) 5 MG TABLET] Take 5 mg by mouth 2 (two) times a day before meals.       Insulin Glargine-Lixisenatide (SOLIQUA SC) Inject 0-25 Units Subcutaneous daily 0-25 Units depending on blood sugar reading       metFORMIN (GLUCOPHAGE) 1000 MG tablet [METFORMIN (GLUCOPHAGE) 1000 MG TABLET] Take 1,000 mg by mouth 2 (two) times a day with meals.       tamsulosin (FLOMAX) 0.4 mg Cp24 [TAMSULOSIN (FLOMAX) 0.4 MG CP24] Take 0.4 mg by mouth Daily after breakfast.       UNABLE TO FIND Take by mouth daily MEDICATION NAME:     Plexus X Factor - 1 capsul Daily  Plexus Probio 5 - 1 capsule Daily   Plexus Bio Cleanse - 1 capsule daily         No Known Allergies    BEN BOYCE RN on 8/4/2022 at 1:34 PM

## 2022-08-10 ENCOUNTER — HOSPITAL ENCOUNTER (OUTPATIENT)
Facility: HOSPITAL | Age: 72
Discharge: HOME | End: 2022-08-10
Attending: INTERNAL MEDICINE | Admitting: INTERNAL MEDICINE
Payer: COMMERCIAL

## 2022-08-10 ENCOUNTER — RESEARCH ENCOUNTER (OUTPATIENT)
Dept: CARDIOLOGY | Facility: CLINIC | Age: 72
End: 2022-08-10

## 2022-08-10 VITALS
HEART RATE: 65 BPM | BODY MASS INDEX: 26.31 KG/M2 | WEIGHT: 205 LBS | SYSTOLIC BLOOD PRESSURE: 112 MMHG | HEIGHT: 74 IN | RESPIRATION RATE: 16 BRPM | OXYGEN SATURATION: 97 % | DIASTOLIC BLOOD PRESSURE: 65 MMHG | TEMPERATURE: 98.7 F

## 2022-08-10 DIAGNOSIS — R55 PRE-SYNCOPE: ICD-10-CM

## 2022-08-10 DIAGNOSIS — R06.09 DOE (DYSPNEA ON EXERTION): ICD-10-CM

## 2022-08-10 DIAGNOSIS — R68.89 DECREASED EXERCISE TOLERANCE: ICD-10-CM

## 2022-08-10 DIAGNOSIS — R94.39 ABNORMAL CARDIOVASCULAR STRESS TEST: ICD-10-CM

## 2022-08-10 LAB
ABO/RH(D): NORMAL
ANION GAP SERPL CALCULATED.3IONS-SCNC: 8 MMOL/L (ref 5–18)
ANTIBODY SCREEN: NEGATIVE
ATRIAL RATE - MUSE: 63 BPM
BUN SERPL-MCNC: 17 MG/DL (ref 8–28)
CALCIUM SERPL-MCNC: 8.8 MG/DL (ref 8.5–10.5)
CHLORIDE BLD-SCNC: 104 MMOL/L (ref 98–107)
CHOLEST SERPL-MCNC: 113 MG/DL
CO2 SERPL-SCNC: 26 MMOL/L (ref 22–31)
CREAT SERPL-MCNC: 0.85 MG/DL (ref 0.7–1.3)
DIASTOLIC BLOOD PRESSURE - MUSE: NORMAL MMHG
ERYTHROCYTE [DISTWIDTH] IN BLOOD BY AUTOMATED COUNT: 13 % (ref 10–15)
FASTING STATUS PATIENT QL REPORTED: YES
GFR SERPL CREATININE-BSD FRML MDRD: >90 ML/MIN/1.73M2
GLUCOSE BLD-MCNC: 121 MG/DL (ref 70–125)
HCT VFR BLD AUTO: 38.2 % (ref 40–53)
HDLC SERPL-MCNC: 31 MG/DL
HGB BLD-MCNC: 13.2 G/DL (ref 13.3–17.7)
INTERPRETATION ECG - MUSE: NORMAL
LDLC SERPL CALC-MCNC: 69 MG/DL
MCH RBC QN AUTO: 31.8 PG (ref 26.5–33)
MCHC RBC AUTO-ENTMCNC: 34.6 G/DL (ref 31.5–36.5)
MCV RBC AUTO: 92 FL (ref 78–100)
P AXIS - MUSE: 62 DEGREES
PLATELET # BLD AUTO: 335 10E3/UL (ref 150–450)
POTASSIUM BLD-SCNC: 4.3 MMOL/L (ref 3.5–5)
PR INTERVAL - MUSE: 168 MS
QRS DURATION - MUSE: 108 MS
QT - MUSE: 440 MS
QTC - MUSE: 450 MS
R AXIS - MUSE: -23 DEGREES
RBC # BLD AUTO: 4.15 10E6/UL (ref 4.4–5.9)
SODIUM SERPL-SCNC: 138 MMOL/L (ref 136–145)
SPECIMEN EXPIRATION DATE: NORMAL
SYSTOLIC BLOOD PRESSURE - MUSE: NORMAL MMHG
T AXIS - MUSE: 16 DEGREES
TRIGL SERPL-MCNC: 64 MG/DL
VENTRICULAR RATE- MUSE: 63 BPM
WBC # BLD AUTO: 7.6 10E3/UL (ref 4–11)

## 2022-08-10 PROCEDURE — 82310 ASSAY OF CALCIUM: CPT | Performed by: INTERNAL MEDICINE

## 2022-08-10 PROCEDURE — C1894 INTRO/SHEATH, NON-LASER: HCPCS | Performed by: INTERNAL MEDICINE

## 2022-08-10 PROCEDURE — 93005 ELECTROCARDIOGRAM TRACING: CPT

## 2022-08-10 PROCEDURE — 93458 L HRT ARTERY/VENTRICLE ANGIO: CPT | Performed by: INTERNAL MEDICINE

## 2022-08-10 PROCEDURE — 250N000009 HC RX 250: Performed by: INTERNAL MEDICINE

## 2022-08-10 PROCEDURE — 250N000013 HC RX MED GY IP 250 OP 250 PS 637: Performed by: INTERNAL MEDICINE

## 2022-08-10 PROCEDURE — C1769 GUIDE WIRE: HCPCS | Performed by: INTERNAL MEDICINE

## 2022-08-10 PROCEDURE — 250N000011 HC RX IP 250 OP 636: Performed by: INTERNAL MEDICINE

## 2022-08-10 PROCEDURE — 80061 LIPID PANEL: CPT | Performed by: INTERNAL MEDICINE

## 2022-08-10 PROCEDURE — 36415 COLL VENOUS BLD VENIPUNCTURE: CPT | Performed by: INTERNAL MEDICINE

## 2022-08-10 PROCEDURE — 86901 BLOOD TYPING SEROLOGIC RH(D): CPT | Performed by: INTERNAL MEDICINE

## 2022-08-10 PROCEDURE — 258N000003 HC RX IP 258 OP 636: Performed by: INTERNAL MEDICINE

## 2022-08-10 PROCEDURE — 93010 ELECTROCARDIOGRAM REPORT: CPT | Performed by: INTERNAL MEDICINE

## 2022-08-10 PROCEDURE — 93458 L HRT ARTERY/VENTRICLE ANGIO: CPT | Mod: 26 | Performed by: INTERNAL MEDICINE

## 2022-08-10 PROCEDURE — 85027 COMPLETE CBC AUTOMATED: CPT | Performed by: INTERNAL MEDICINE

## 2022-08-10 PROCEDURE — 999N000054 HC STATISTIC EKG NON-CHARGEABLE

## 2022-08-10 PROCEDURE — 272N000001 HC OR GENERAL SUPPLY STERILE: Performed by: INTERNAL MEDICINE

## 2022-08-10 RX ORDER — ASPIRIN 325 MG
325 TABLET ORAL ONCE
Status: DISCONTINUED | OUTPATIENT
Start: 2022-08-10 | End: 2022-08-10 | Stop reason: HOSPADM

## 2022-08-10 RX ORDER — OXYCODONE HYDROCHLORIDE 5 MG/1
5 TABLET ORAL EVERY 4 HOURS PRN
Status: DISCONTINUED | OUTPATIENT
Start: 2022-08-10 | End: 2022-08-10 | Stop reason: HOSPADM

## 2022-08-10 RX ORDER — NITROGLYCERIN 0.4 MG/1
TABLET SUBLINGUAL
Qty: 25 TABLET | Refills: 1 | Status: SHIPPED | OUTPATIENT
Start: 2022-08-10 | End: 2024-06-20

## 2022-08-10 RX ORDER — FENTANYL CITRATE 50 UG/ML
25 INJECTION, SOLUTION INTRAMUSCULAR; INTRAVENOUS
Status: DISCONTINUED | OUTPATIENT
Start: 2022-08-10 | End: 2022-08-10 | Stop reason: HOSPADM

## 2022-08-10 RX ORDER — DEXTROSE MONOHYDRATE 25 G/50ML
25-50 INJECTION, SOLUTION INTRAVENOUS
Status: DISCONTINUED | OUTPATIENT
Start: 2022-08-10 | End: 2022-08-10 | Stop reason: HOSPADM

## 2022-08-10 RX ORDER — IOPAMIDOL 755 MG/ML
INJECTION, SOLUTION INTRAVASCULAR
Status: DISCONTINUED | OUTPATIENT
Start: 2022-08-10 | End: 2022-08-10 | Stop reason: HOSPADM

## 2022-08-10 RX ORDER — OXYCODONE HYDROCHLORIDE 5 MG/1
10 TABLET ORAL EVERY 4 HOURS PRN
Status: DISCONTINUED | OUTPATIENT
Start: 2022-08-10 | End: 2022-08-10 | Stop reason: HOSPADM

## 2022-08-10 RX ORDER — ATORVASTATIN CALCIUM 80 MG/1
80 TABLET, FILM COATED ORAL DAILY
Qty: 90 TABLET | Refills: 3 | Status: SHIPPED | OUTPATIENT
Start: 2022-08-10 | End: 2023-12-28

## 2022-08-10 RX ORDER — NICOTINE POLACRILEX 4 MG
15-30 LOZENGE BUCCAL
Status: DISCONTINUED | OUTPATIENT
Start: 2022-08-10 | End: 2022-08-10 | Stop reason: HOSPADM

## 2022-08-10 RX ORDER — NALOXONE HYDROCHLORIDE 0.4 MG/ML
0.2 INJECTION, SOLUTION INTRAMUSCULAR; INTRAVENOUS; SUBCUTANEOUS
Status: DISCONTINUED | OUTPATIENT
Start: 2022-08-10 | End: 2022-08-10 | Stop reason: HOSPADM

## 2022-08-10 RX ORDER — NALOXONE HYDROCHLORIDE 0.4 MG/ML
0.4 INJECTION, SOLUTION INTRAMUSCULAR; INTRAVENOUS; SUBCUTANEOUS
Status: DISCONTINUED | OUTPATIENT
Start: 2022-08-10 | End: 2022-08-10 | Stop reason: HOSPADM

## 2022-08-10 RX ORDER — LIDOCAINE 40 MG/G
CREAM TOPICAL
Status: DISCONTINUED | OUTPATIENT
Start: 2022-08-10 | End: 2022-08-10 | Stop reason: HOSPADM

## 2022-08-10 RX ORDER — ISOSORBIDE MONONITRATE 30 MG/1
30 TABLET, EXTENDED RELEASE ORAL DAILY
Qty: 30 TABLET | Refills: 11 | Status: ON HOLD | OUTPATIENT
Start: 2022-08-10 | End: 2022-09-05

## 2022-08-10 RX ORDER — ATROPINE SULFATE 0.1 MG/ML
0.5 INJECTION INTRAVENOUS
Status: DISCONTINUED | OUTPATIENT
Start: 2022-08-10 | End: 2022-08-10 | Stop reason: HOSPADM

## 2022-08-10 RX ORDER — SODIUM CHLORIDE 9 MG/ML
INJECTION, SOLUTION INTRAVENOUS CONTINUOUS
Status: DISCONTINUED | OUTPATIENT
Start: 2022-08-10 | End: 2022-08-10 | Stop reason: HOSPADM

## 2022-08-10 RX ORDER — FLUMAZENIL 0.1 MG/ML
0.2 INJECTION, SOLUTION INTRAVENOUS
Status: DISCONTINUED | OUTPATIENT
Start: 2022-08-10 | End: 2022-08-10 | Stop reason: HOSPADM

## 2022-08-10 RX ORDER — NITROGLYCERIN 0.4 MG/1
TABLET SUBLINGUAL
Status: DISCONTINUED | OUTPATIENT
Start: 2022-08-10 | End: 2022-08-10 | Stop reason: HOSPADM

## 2022-08-10 RX ORDER — NITROGLYCERIN 0.4 MG/1
TABLET SUBLINGUAL
Qty: 25 TABLET | Refills: 3 | Status: ON HOLD | OUTPATIENT
Start: 2022-08-10 | End: 2022-09-06

## 2022-08-10 RX ORDER — ACETAMINOPHEN 325 MG/1
650 TABLET ORAL EVERY 4 HOURS PRN
Status: DISCONTINUED | OUTPATIENT
Start: 2022-08-10 | End: 2022-08-10 | Stop reason: HOSPADM

## 2022-08-10 RX ORDER — DIAZEPAM 5 MG
5 TABLET ORAL
Status: COMPLETED | OUTPATIENT
Start: 2022-08-10 | End: 2022-08-10

## 2022-08-10 RX ORDER — FENTANYL CITRATE 50 UG/ML
INJECTION, SOLUTION INTRAMUSCULAR; INTRAVENOUS
Status: DISCONTINUED | OUTPATIENT
Start: 2022-08-10 | End: 2022-08-10 | Stop reason: HOSPADM

## 2022-08-10 RX ORDER — ASPIRIN 81 MG/1
243 TABLET, CHEWABLE ORAL ONCE
Status: DISCONTINUED | OUTPATIENT
Start: 2022-08-10 | End: 2022-08-10 | Stop reason: HOSPADM

## 2022-08-10 RX ADMIN — DIAZEPAM 5 MG: 5 TABLET ORAL at 08:02

## 2022-08-10 RX ADMIN — SODIUM CHLORIDE: 9 INJECTION, SOLUTION INTRAVENOUS at 07:15

## 2022-08-10 ASSESSMENT — ACTIVITIES OF DAILY LIVING (ADL)
ADLS_ACUITY_SCORE: 35

## 2022-08-10 ASSESSMENT — EJECTION FRACTION: EF_VALUE: .24

## 2022-08-10 NOTE — PROGRESS NOTES
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 The consent form was reviewed with the patient.    The consent discussion included:    Study description and purpose     Qualifications for participation    Study procedures    Length of study    Device description     Risks of participation    Benefits (if any)    Alternatives    Voluntary participation    Confidentiality     Compensation/costs of participation    Injury and legal rights    The subject was provided time to review the consent form and consider participation. his questions were answered to his satisfaction. The patient has voluntarily agreed to participate in the above noted study.     The consent form version [id number] and HIPPA form version [id number] was signed 08/10/22.    The subject was provided with a copy of the consent form and HIPPA.    Bridgette Rodriguez

## 2022-08-10 NOTE — Clinical Note
[address] OB GYN A department of [place] [address]  Phone: [phone]  Fax: TO [place] CNM        December 20, 2021     [address]      Dear Dat Osorio: This letter is a reminder that your Mammogram test is due. Please call our office to schedule an appointment. If you've already underwent or scheduled this procedure, please disregard this notice.     Sincerely,        TO Esposito CNM Sheath prepped and inserted in the right radial artery.

## 2022-08-10 NOTE — PLAN OF CARE
Coronary angiogram with possible stent due to shortness of breath and abnormal stress MRI.  No questions.  Wife present.

## 2022-08-10 NOTE — PRE-PROCEDURE
GENERAL PRE-PROCEDURE:   Procedure:  Coronary angiogram with possible PCI  Date/Time:  8/10/2022 7:24 AM    Written consent obtained?: Yes    Risks and benefits: Risks, benefits and alternatives were discussed    Consent given by:  Patient  Patient states understanding of procedure being performed: Yes    Patient's understanding of procedure matches consent: Yes    Procedure consent matches procedure scheduled: Yes    Expected level of sedation:  Moderate  Appropriately NPO:  Yes  ASA Class:  3 (abnormal cardiac stress MRI, WATTERS, Dizziness/Lightheadedness, HLD, DM Type II, BPH, DANIELLE)  Mallampati  :  Grade 3- soft palate visible, posterior pharyngeal wall not visible  Lungs:  Lungs clear with good breath sounds bilaterally  Heart:  Normal heart sounds and rate  History & Physical reviewed:  History and physical reviewed and no updates needed  Statement of review:  I have reviewed the lab findings, diagnostic data, medications, and the plan for sedation

## 2022-08-10 NOTE — DISCHARGE INSTRUCTIONS

## 2022-08-10 NOTE — INTERVAL H&P NOTE
"I have reviewed the surgical (or preoperative) H&P that is linked to this encounter, and examined the patient. There are no significant changes    Clinical Conditions Present on Arrival:  Clinically Significant Risk Factors Present on Admission                   # Overweight: Estimated body mass index is 26.32 kg/m  as calculated from the following:    Height as of this encounter: 1.88 m (6' 2\").    Weight as of this encounter: 93 kg (205 lb).       "

## 2022-08-10 NOTE — PROGRESS NOTES
A Study to EXhibit Percutaneous coronary Artery  dilatation with Non-Slip Element balloon  (EXPANSE-PTCA)  Protocol Revision B  PI: Dr. Luciano    General Inclusion Criteria: All must be YES   General Inclusion Criteria    1 Age 18 years or older.    Yes   2 Willing to provide written informed consent and written Health Insurance Portability and Accountability Act (HIPAA) authorization prior to initiation of study-related procedures.    Yes   3 Agree to not participate in any other clinical study, during hospitalization for the index procedure, that would interfere with the endpoints of this study.    Yes   4 Clinical evidence of ischemic heart disease, stable/unstable angina, or silent ischemia.    Yes   5 Acceptable candidate for PCI and emergency coronary artery bypass grafting (CABG) and is planned for possible PTCA and/or stent placement.    Yes   Angiographic Inclusion Criteria: All must be YES   Angiographic Inclusion Criteria    6  De bowen or restenotic lesion(s) in native coronary arteries, including in-stent restenosis    Yes   7 A maximum of two lesions, including at least one target lesion, in single or double vessel coronary artery disease.     a. If two target lesions are defined, then no non-target lesions can be treated.     b. If a single target lesion is defined, then a single non-target lesion may be treated, but if so, it must be located in a different coronary artery from the target lesion.    No   8 Target lesion(s) must have a reference vessel diameter (RVD) between 2.0 mm and 4.0 mm by visual estimation.    No   9 Target lesion(s) must have a diameter stenosis of (a) ?70% by visual estimation or (b) >50% by visual estimation and a fractional flow reserve (FFR) or <0.80 or resting full-cycle rato (RFR) or instantaneous free-wave ratio (iFR) <0.9.    No   10 Treatment of non-target lesion, if any, must be completed prior to treatment of target lesion; must not, in the opinion of the  investigator, impact the conduct or completion of the index procedure; and must be deemed a clinical angiographic success as visually assessed by the investigator.    No     General Exclusion Criteria: All must be NO   General Exclusion criteria    1 Known hypersensitivity or contraindication to aspirin, heparin, bivalirudin, anti-platelet medications, a clopidogrel non-responder, or sensitivity to contrast media that cannot be adequately pre-medicated or replaced with a clinically suitable alternative.   No   2 Known diagnosis of type I myocardial infarction (resulting from primary reduction of flow from a culprit lesion likely to have a thrombotic component) within 7 days prior to the index procedure.   No   3 Known pregnancy or is nursing. Women of child-bearing potential should have a documented negative pregnancy test within 7 days prior to index procedure.   No   4 Planned target lesion treatment with atherectomy (rotational, orbital or laser), cutting balloon, thrombectomy, lithotripsy or an unapproved device during the index procedure.   No   5 Serum creatinine >2.0 mg/dl within 7 days prior to the index procedure.   No   6 Cerebrovascular accident within 6 months prior to the index procedure.   No   7 Active peptic ulcer or active gastrointestinal bleeding within 6 months prior to the index procedure.   No   8 Left ventricular ejection fraction (LVEF) <30% based on most recent measurement within a year of the index procedure (if LVEF is not available in the medical records, it may be obtained at the time of the index procedure, prior to enrollment).   No   9 Target lesion located within a bypass graft (venous or arterial) or graft anastomosis.   No   10 Previous percutaneous intervention, within 9 months before the study procedure, on lesions in a target vessel (including side branches) that are located within 10 mm from the current target lesion(s).   No   11 Target lesion(s) with complete total occlusion  () defined as the complete obstruction of a native coronary artery, exhibiting YAMILA 0 or YAMILA 1 flow, with an occlusion duration of at least 3 months.   No   12 Unstable hemodynamics or shock   No   13 Other medical condition which might, in the opinion of the investigator, put the patient at risk or confound the results of the study   No   Angiographic Exclusion Criteria: All must be NO   Angiographic Exclusion Criteria    14 Target lesion(s) longer than 32 mm by visual estimation.    No   15 Extreme angulation (90  or greater) within 5 mm of the target lesion.    No   16 Target lesion(s) demonstrating flow limiting dissection (NHLBI Grade C or higher) piror to deployment of the [place] NSE ALPHA.    No   17 Unprotected left main coronary artery disease (>50% diameter stenosis).    No   18 Coronary artery spasm of the target vessel in the absence of a significant stenosis. No   19 Target lesion(s) with angiographic presence of probable or definite thrombus. No   20 Target lesion(s) involves a bifurcation requiring treatment with more than one stent or pre-dilatation of a side branch >2.0 mm in diameter. No   21 Target lesion(s) located in bifurcation beyond stent struts. No   22 Target lesion(s) located distal to an implanted stent. No   23 Target lesion(s) with stent damage. No   24 Non-target lesion that meets any of the following criteria:    Located within a bypass graft (venous or arterial)    Located in an unprotected left main coronary artery    A     Involves a bifurcation No     Subject has not met all inclusion and exclusion criteria and was not fully enrolled in the EXPANSE PTCA study. Pt was deemed an angiographic screen fail.    Bridgette Rodriguez

## 2022-08-17 ENCOUNTER — OFFICE VISIT (OUTPATIENT)
Dept: CARDIOLOGY | Facility: CLINIC | Age: 72
End: 2022-08-17
Payer: COMMERCIAL

## 2022-08-17 ENCOUNTER — PREP FOR PROCEDURE (OUTPATIENT)
Dept: CARDIOLOGY | Facility: CLINIC | Age: 72
End: 2022-08-17

## 2022-08-17 VITALS
WEIGHT: 206 LBS | HEIGHT: 74 IN | DIASTOLIC BLOOD PRESSURE: 55 MMHG | BODY MASS INDEX: 26.44 KG/M2 | HEART RATE: 66 BPM | SYSTOLIC BLOOD PRESSURE: 102 MMHG

## 2022-08-17 DIAGNOSIS — R06.09 DOE (DYSPNEA ON EXERTION): ICD-10-CM

## 2022-08-17 DIAGNOSIS — R94.39 ABNORMAL CARDIOVASCULAR STRESS TEST: ICD-10-CM

## 2022-08-17 DIAGNOSIS — I25.10 CORONARY ARTERY DISEASE INVOLVING NATIVE HEART, UNSPECIFIED VESSEL OR LESION TYPE, UNSPECIFIED WHETHER ANGINA PRESENT: Primary | ICD-10-CM

## 2022-08-17 DIAGNOSIS — R06.02 SHORTNESS OF BREATH: ICD-10-CM

## 2022-08-17 DIAGNOSIS — I25.10 CAD (CORONARY ARTERY DISEASE): Primary | ICD-10-CM

## 2022-08-17 PROCEDURE — 99203 OFFICE O/P NEW LOW 30 MIN: CPT | Performed by: THORACIC SURGERY (CARDIOTHORACIC VASCULAR SURGERY)

## 2022-08-17 RX ORDER — LANCETS 33 GAUGE
EACH MISCELLANEOUS SEE ADMIN INSTRUCTIONS
COMMUNITY
Start: 2021-11-13

## 2022-08-17 RX ORDER — PEN NEEDLE, DIABETIC 32 GX 1/4"
NEEDLE, DISPOSABLE MISCELLANEOUS
COMMUNITY
Start: 2022-03-21 | End: 2024-06-20

## 2022-08-17 NOTE — LETTER
8/17/2022    Misha Dawson MD  [facility] [address]  [facility] [address]    RE: Bala Hopper       Dear Colleague,     I had the pleasure of seeing Bala Hopper in the [facility].  ASKED BY REFERRING PHYSICIAN: Dr. Grande to evaluate this patient for coronary artery bypass grafting.    CHIEF COMPLAINT: Heart blockage.    HPI: Bala is a 71 year old male who presents with dyspnea on exertion and this has progressed.  His MRI was positive for ischemia.  He also has some dizziness and lightheadedness. Additionally he has dyslipidemia and obstructive sleep apnea.  He was found to have severe single vessel coronary artery disease.    PAST MEDICAL HISTORY:  Past Medical History:   Diagnosis Date     BPH (benign prostatic hyperplasia)      Depression      Diabetes (H)      Diabetes mellitus (H)      Hyperlipidemia LDL goal <100      DANIELLE (obstructive sleep apnea)        PAST SURGICAL HISTORY:  Past Surgical History:   Procedure Laterality Date     CV CORONARY ANGIOGRAM N/A 8/10/2022    Procedure: Coronary Angiogram;  Surgeon: Annette Leavitt MD;  Location: [facility] CATH LAB CV     CV LEFT HEART CATH N/A 8/10/2022    Procedure: Left Heart Catheterization;  Surgeon: Annette Leavitt MD;  Location: [facility] CATH LAB CV     tendon right wrist Right        FAMILY HISTORY:   No family history on file.    SOCIAL HISTORY:  Social History     Socioeconomic History     Marital status:      Spouse name: Not on file     Number of children: Not on file     Years of education: Not on file     Highest education level: Not on file   Occupational History     Not on file   Tobacco Use     Smoking status: Never Smoker     Smokeless tobacco: Never Used   Substance and Sexual Activity     Alcohol use: Not Currently     Drug use: Never     Sexual activity: Not on file   Other Topics Concern     Parent/sibling w/ CABG, MI or angioplasty before 65F 55M? Not Asked   Social  History Narrative     Not on file     Social Determinants of Health     Financial Resource Strain: Not on file   Food Insecurity: Not on file   Transportation Needs: Not on file   Physical Activity: Not on file   Stress: Not on file   Social Connections: Not on file   Intimate Partner Violence: Not on file   Housing Stability: Not on file        ALLERGIES:   No Known Allergies    CURRENT MEDICATIONS:   Prescription Medications as of 2022       Rx Number Disp Refills Start End Last Dispensed Date Next Fill Date Owning Pharmacy    aspirin 81 MG EC tablet    2015        Sig: [ASPIRIN 81 MG EC TABLET] Take 81 mg by mouth daily.    Class: Historical    Route: Oral    atorvastatin (LIPITOR) 80 MG tablet  90 tablet 3 8/10/2022 8/10/2023   [facility] [address] [place] [address] [facility]    Sig: Take 1 tablet (80 mg) by mouth daily    Class: E-Prescribe    Route: Oral    BD PEN NEEDLE MICRO U/F 32G X 6 MM miscellaneous    3/21/2022    [facility] [address] [place] [address] [facility]    Si 4 TIMES DAILY AS DIRECTED AS NEEDED E11.42    Class: Historical    Cholecalciferol (D3 ADULT PO)        [place] [address] [facility]    Sig: Take 4,000 Units by mouth daily    Class: Historical    Route: Oral    finasteride (PROSCAR) 5 MG tablet        [place] [address] [facility]    Sig: Take 5 mg by mouth daily    Class: Historical    Route: Oral    FLUoxetine (PROZAC) 20 MG capsule    2022    [place] [address] [facility]    Sig: TAKE 3 CAPSULES BY MOUTH IN THE MORNING    Class: Historical    glipiZIDE (GLUCOTROL) 5 MG tablet    2015        Sig: [GLIPIZIDE (GLUCOTROL) 5 MG TABLET] Take 5 mg by mouth 2 (two) times a day before meals.    Class: Historical    Route: Oral    Insulin Glargine-Lixisenatide (SOLIQUA SC)        [facility] [address] [place], [place] [id number] [address]     "Sig: Inject 0-25 Units Subcutaneous daily 0-25 Units depending on blood sugar reading    Class: Historical    Route: Subcutaneous    isosorbide mononitrate (IMDUR) 30 MG 24 hr tablet  30 tablet 11 8/10/2022 8/10/2023   [place] [address]    Sig: Take 1 tablet (30 mg) by mouth daily    Class: E-Prescribe    Route: Oral    Lancets (ONETOUCH DELICA PLUS [id number]) MISC    11/13/2021    [place] [address]    Sig: See Admin Instructions    Class: Historical    metFORMIN (GLUCOPHAGE) 1000 MG tablet    11/23/2015        Sig: [METFORMIN (GLUCOPHAGE) 1000 MG TABLET] Take 1,000 mg by mouth 2 (two) times a day with meals.    Class: Historical    Route: Oral    nitroGLYcerin (NITROSTAT) 0.4 MG sublingual tablet  25 tablet 1 8/10/2022    [place] [address] [facility]    Sig: For chest pain place 1 tablet under the tongue every 5 minutes for 3 doses. If symptoms persist 5 minutes after 1st dose call 911.    Class: E-Prescribe    nitroGLYcerin (NITROSTAT) 0.4 MG sublingual tablet  25 tablet 3 8/10/2022    [place] [address]    Sig: One tablet under the tongue every 5 minutes if needed for chest pain. May repeat every 5 minutes for a maximum of 3 doses in 15 minutes\"    Class: E-Prescribe    tamsulosin (FLOMAX) 0.4 mg Cp24    11/23/2015        Sig: [TAMSULOSIN (FLOMAX) 0.4 MG CP24] Take 0.4 mg by mouth Daily after breakfast.    Class: Historical    Route: Oral    UNABLE TO FIND        [place] [address] [facility]    Sig: Take by mouth daily MEDICATION NAME:     Plexus X Factor - 1 capsul Daily  Plexus Probio 5 - 1 capsule Daily   Plexus Bio Cleanse - 1 capsule daily    Class: Historical    Route: Oral    FLUoxetine 20 MG tablet    10/9/2021    [place] [address] [facility]    Sig: Take 60 mg by mouth " "daily     Class: Historical    Route: Oral          REVIEW OF SYSTEMS:   Gen: denies frequent headaches, double/blurry vision, insomnia, fatigue, unexplained weight loss/gain. No previous anesthesia reactions.  CV: WATTERS  Pulm: denies shortness of breath, asthma or wheezing  GI/: denies liver or kidney problems, blood in stool or BRBPR, difficulty urinating  Endo: Diabetes  Heme/Onc: denies bleeding or clotting disorders, no family problems with bleeding/clotting diorders  MS: no weakness, tremors or gait instability  Neuro: denies depression, memory problems, no dysesthesias, no previous strokes, no migraines, no dysphagia  Skin: No petechiae, purpura or rash.    PHYSICAL EXAMINATION:   /55 (BP Location: Left arm, Patient Position: Sitting, Cuff Size: Adult Regular)   Pulse 66   Ht 1.88 m (6' 2\")   Wt 93.4 kg (206 lb)   BMI 26.45 kg/m    General: alert and oriented x 3, pleasant, no acute distress  CV: S1 S2, no murmurs, rubs or gallops, regular rate and rhythm, no peripheral edema, no carotid or abdominal bruits, pulses in upper and lower extremities palpable  Pulm: bilateral breath sounds, clear to auscultation, easy work of breathing  GI: (+) bowel sounds, soft non-tender and non-distended  : voiding without problems  MS: moves all extremities x 4,  5+/5+ equal strength bilaterally, bilateral varicose veins below the knee.  Neuro: pupils equal round and reactive to light, cranial nerves, II-XII grossly intact, no gross neurologic deficits noted    LABS: pending    PROCEDURES/IMAGING:  Chest X-Ray: Pending  Angio: Occluded LAD and tight lesion of a diagonal. Moderate disease in the circumflex  Echo: Pending  CT: Pending  MRI: Small area of ischemic in the mid to distal anteroseptal wall  Carotid US: Pending     ASSESSMENT/PLAN:   Bala is a 71 year old gentleman with dyspnea on exertion that has progressed.  He has severe single vessel coronary artery disease and an echo is pending.  We plan to " perform a double vessel coronary artery bypass grafting procedure and for conduit we will use the left internal mammary artery and reversed saphenous vein graft.  Given his varicosities it may be necessary to use the lesser saphenous vein.  He and his wife understand that the risks for this procedure include: bleeding, infection, stroke, sternal dehiscence, myocardial infarction, arrhythmias, the need for a pacemaker, prolonged ventilation, pneumonia, liver/renal failure, aortic dissection, and an operative mortality of less than 2 percent.  They accept these risks and are agreeable with the plan of proceeding with surgery on 8-.    1. Complete outpatient work-up  2. CABG on 8-.       Approximately 30 were minutes spent with the patient in clinic at this visit.    CC  Patient Care Team:  Misha Dawson MD as PCP - General (Family Medicine)  Howard Contreras MD as Assigned Neuroscience Provider  Sreedhar Grande MD as Assigned Heart and Vascular Provider  OMEGA AKBAR    Thank you for allowing me to participate in the care of your patient.      Sincerely,     Carline Bradshaw MD     [facility]  cc:   Omega Akbar MD  [address] [facility] [address]  [place] [address]

## 2022-08-17 NOTE — PROGRESS NOTES
ASKED BY REFERRING PHYSICIAN: Dr. Grande to evaluate this patient for coronary artery bypass grafting.    CHIEF COMPLAINT: Heart blockage.    HPI: Bala is a 71 year old male who presents with dyspnea on exertion and this has progressed.  His MRI was positive for ischemia.  He also has some dizziness and lightheadedness. Additionally he has dyslipidemia and obstructive sleep apnea.  He was found to have severe single vessel coronary artery disease.    PAST MEDICAL HISTORY:  Past Medical History:   Diagnosis Date     BPH (benign prostatic hyperplasia)      Depression      Diabetes (H)      Diabetes mellitus (H)      Hyperlipidemia LDL goal <100      DANIELLE (obstructive sleep apnea)        PAST SURGICAL HISTORY:  Past Surgical History:   Procedure Laterality Date     CV CORONARY ANGIOGRAM N/A 8/10/2022    Procedure: Coronary Angiogram;  Surgeon: Annette Leavitt MD;  Location: [facility] CATH LAB CV     CV LEFT HEART CATH N/A 8/10/2022    Procedure: Left Heart Catheterization;  Surgeon: Annette Leavitt MD;  Location: [facility] CATH LAB CV     tendon right wrist Right        FAMILY HISTORY:   No family history on file.    SOCIAL HISTORY:  Social History     Socioeconomic History     Marital status:      Spouse name: Not on file     Number of children: Not on file     Years of education: Not on file     Highest education level: Not on file   Occupational History     Not on file   Tobacco Use     Smoking status: Never Smoker     Smokeless tobacco: Never Used   Substance and Sexual Activity     Alcohol use: Not Currently     Drug use: Never     Sexual activity: Not on file   Other Topics Concern     Parent/sibling w/ CABG, MI or angioplasty before 65F 55M? Not Asked   Social History Narrative     Not on file     Social Determinants of Health     Financial Resource Strain: Not on file   Food Insecurity: Not on file   Transportation Needs: Not on file   Physical Activity: Not on file   Stress: Not on file   Social  Connections: Not on file   Intimate Partner Violence: Not on file   Housing Stability: Not on file        ALLERGIES:   No Known Allergies    CURRENT MEDICATIONS:   Prescription Medications as of 2022       Rx Number Disp Refills Start End Last Dispensed Date Next Fill Date Owning Pharmacy    aspirin 81 MG EC tablet    2015        Sig: [ASPIRIN 81 MG EC TABLET] Take 81 mg by mouth daily.    Class: Historical    Route: Oral    atorvastatin (LIPITOR) 80 MG tablet  90 tablet 3 8/10/2022 8/10/2023   [place] [address]    Sig: Take 1 tablet (80 mg) by mouth daily    Class: E-Prescribe    Route: Oral    BD PEN NEEDLE MICRO U/F 32G X 6 MM miscellaneous    3/21/2022    [place] [address] [facility]    Si 4 TIMES DAILY AS DIRECTED AS NEEDED E11.42    Class: Historical    Cholecalciferol (D3 ADULT PO)        [place] [address] [facility]    Sig: Take 4,000 Units by mouth daily    Class: Historical    Route: Oral    finasteride (PROSCAR) 5 MG tablet        [place] [address] [facility]    Sig: Take 5 mg by mouth daily    Class: Historical    Route: Oral    FLUoxetine (PROZAC) 20 MG capsule    2022    [place] [address] [facility]    Sig: TAKE 3 CAPSULES BY MOUTH IN THE MORNING    Class: Historical    glipiZIDE (GLUCOTROL) 5 MG tablet    2015        Sig: [GLIPIZIDE (GLUCOTROL) 5 MG TABLET] Take 5 mg by mouth 2 (two) times a day before meals.    Class: Historical    Route: Oral    Insulin Glargine-Lixisenatide (SOLIQUA SC)        [place] [address] [facility]    Sig: Inject 0-25 Units Subcutaneous daily 0-25 Units depending on blood sugar reading    Class: Historical    Route: Subcutaneous    isosorbide mononitrate (IMDUR) 30 MG 24 hr tablet  30 tablet 11 8/10/2022 8/10/2023   [place] [id number]  [address] [place] [address]    Sig: Take 1 tablet (30 mg) by mouth daily    Class: E-Prescribe    Route: Oral    Lancets (ONETOUCH DELICA PLUS [id number]) MISC    11/13/2021    [place] [address] [facility]    Sig: See Admin Instructions    Class: Historical    metFORMIN (GLUCOPHAGE) 1000 MG tablet    11/23/2015        Sig: [METFORMIN (GLUCOPHAGE) 1000 MG TABLET] Take 1,000 mg by mouth 2 (two) times a day with meals.    Class: Historical    Route: Oral    nitroGLYcerin (NITROSTAT) 0.4 MG sublingual tablet  25 tablet 1 8/10/2022    [place] [address] [facility]    Sig: For chest pain place 1 tablet under the tongue every 5 minutes for 3 doses. If symptoms persist 5 minutes after 1st dose call 911.    Class: E-Prescribe    nitroGLYcerin (NITROSTAT) 0.4 MG sublingual tablet  25 tablet 3 8/10/2022    [place] [address] [facility]    Sig: One tablet under the tongue every 5 minutes if needed for chest pain. May repeat every 5 minutes for a maximum of 3 doses in 15 minutes\"    Class: E-Prescribe    tamsulosin (FLOMAX) 0.4 mg Cp24    11/23/2015        Sig: [TAMSULOSIN (FLOMAX) 0.4 MG CP24] Take 0.4 mg by mouth Daily after breakfast.    Class: Historical    Route: Oral    UNABLE TO FIND        [place] [address] [facility]    Sig: Take by mouth daily MEDICATION NAME:     Plexus X Factor - 1 capsul Daily  Plexus Probio 5 - 1 capsule Daily   Plexus Bio Cleanse - 1 capsule daily    Class: Historical    Route: Oral    FLUoxetine 20 MG tablet    10/9/2021    [place] [address]    Sig: Take 60 mg by mouth daily     Class: Historical    Route: Oral          REVIEW OF SYSTEMS:   Gen: denies frequent headaches, double/blurry vision, insomnia, fatigue, unexplained weight loss/gain. No previous anesthesia reactions.  CV: WATTERS  Pulm: denies shortness " "of breath, asthma or wheezing  GI/: denies liver or kidney problems, blood in stool or BRBPR, difficulty urinating  Endo: Diabetes  Heme/Onc: denies bleeding or clotting disorders, no family problems with bleeding/clotting diorders  MS: no weakness, tremors or gait instability  Neuro: denies depression, memory problems, no dysesthesias, no previous strokes, no migraines, no dysphagia  Skin: No petechiae, purpura or rash.    PHYSICAL EXAMINATION:   /55 (BP Location: Left arm, Patient Position: Sitting, Cuff Size: Adult Regular)   Pulse 66   Ht 1.88 m (6' 2\")   Wt 93.4 kg (206 lb)   BMI 26.45 kg/m    General: alert and oriented x 3, pleasant, no acute distress  CV: S1 S2, no murmurs, rubs or gallops, regular rate and rhythm, no peripheral edema, no carotid or abdominal bruits, pulses in upper and lower extremities palpable  Pulm: bilateral breath sounds, clear to auscultation, easy work of breathing  GI: (+) bowel sounds, soft non-tender and non-distended  : voiding without problems  MS: moves all extremities x 4,  5+/5+ equal strength bilaterally, bilateral varicose veins below the knee.  Neuro: pupils equal round and reactive to light, cranial nerves, II-XII grossly intact, no gross neurologic deficits noted    LABS: pending    PROCEDURES/IMAGING:  Chest X-Ray: Pending  Angio: Occluded LAD and tight lesion of a diagonal. Moderate disease in the circumflex  Echo: Pending  CT: Pending  MRI: Small area of ischemic in the mid to distal anteroseptal wall  Carotid US: Pending     ASSESSMENT/PLAN:   Bala is a 71 year old gentleman with dyspnea on exertion that has progressed.  He has severe single vessel coronary artery disease and an echo is pending.  We plan to perform a double vessel coronary artery bypass grafting procedure and for conduit we will use the left internal mammary artery and reversed saphenous vein graft.  Given his varicosities it may be necessary to use the lesser saphenous vein.  He and " his wife understand that the risks for this procedure include: bleeding, infection, stroke, sternal dehiscence, myocardial infarction, arrhythmias, the need for a pacemaker, prolonged ventilation, pneumonia, liver/renal failure, aortic dissection, and an operative mortality of less than 2 percent.  They accept these risks and are agreeable with the plan of proceeding with surgery on 8-.    1. Complete outpatient work-up  2. CABG on 8-.       Approximately 30 were minutes spent with the patient in clinic at this visit.    CC  Patient Care Team:  Misha Dawson MD as PCP - General (Family Medicine)  Howard Contreras MD as Assigned Neuroscience Provider  Sreedhar Grande MD as Assigned Heart and Vascular Provider  OMEGA AKBAR

## 2022-08-17 NOTE — PATIENT INSTRUCTIONS
You were seen today in the [facility] Cardiovascular Surgery Clinic    Surgery will be scheduled after you have completed these tests:    Vein Mapping  Echocardiogram  Chest xray  Pre-admission Testing (PAT)    Please call ALEX Colón surgery coordinator with any questions.  Thank you.    Phone [phone]  Fax [phone]

## 2022-08-18 DIAGNOSIS — I25.10 CORONARY ARTERY DISEASE INVOLVING NATIVE HEART, UNSPECIFIED VESSEL OR LESION TYPE, UNSPECIFIED WHETHER ANGINA PRESENT: Primary | ICD-10-CM

## 2022-08-18 DIAGNOSIS — R79.9 ABNORMAL FINDING OF BLOOD CHEMISTRY, UNSPECIFIED: ICD-10-CM

## 2022-08-18 DIAGNOSIS — I99.8 OTHER DISORDER OF CIRCULATORY SYSTEM: ICD-10-CM

## 2022-08-18 RX ORDER — SODIUM CHLORIDE, SODIUM GLUCONATE, SODIUM ACETATE, POTASSIUM CHLORIDE AND MAGNESIUM CHLORIDE 526; 502; 368; 37; 30 MG/100ML; MG/100ML; MG/100ML; MG/100ML; MG/100ML
1000 INJECTION, SOLUTION INTRAVENOUS
Status: CANCELLED | OUTPATIENT
Start: 2022-09-02 | End: 2022-09-02

## 2022-08-18 RX ORDER — ASPIRIN 81 MG/1
162 TABLET, CHEWABLE ORAL
Status: CANCELLED | OUTPATIENT
Start: 2022-09-02

## 2022-08-18 RX ORDER — CEFAZOLIN SODIUM/WATER 2 G/20 ML
2 SYRINGE (ML) INTRAVENOUS
Status: CANCELLED | OUTPATIENT
Start: 2022-09-02

## 2022-08-18 RX ORDER — DEXMEDETOMIDINE HYDROCHLORIDE 4 UG/ML
0.2-1.2 INJECTION, SOLUTION INTRAVENOUS CONTINUOUS
Status: CANCELLED | OUTPATIENT
Start: 2022-09-02

## 2022-08-18 RX ORDER — LIDOCAINE 40 MG/G
CREAM TOPICAL
Status: CANCELLED | OUTPATIENT
Start: 2022-08-18

## 2022-08-18 RX ORDER — ASPIRIN 81 MG/1
81 TABLET, CHEWABLE ORAL
Status: CANCELLED | OUTPATIENT
Start: 2022-09-02

## 2022-08-18 RX ORDER — CEFAZOLIN SODIUM/WATER 2 G/20 ML
2 SYRINGE (ML) INTRAVENOUS SEE ADMIN INSTRUCTIONS
Status: CANCELLED | OUTPATIENT
Start: 2022-09-02

## 2022-08-18 RX ORDER — CHLORHEXIDINE GLUCONATE ORAL RINSE 1.2 MG/ML
10 SOLUTION DENTAL ONCE
Status: CANCELLED | OUTPATIENT
Start: 2022-09-02 | End: 2022-08-18

## 2022-08-18 RX ORDER — PHENYLEPHRINE HCL IN 0.9% NACL 50MG/250ML
.1-6 PLASTIC BAG, INJECTION (ML) INTRAVENOUS CONTINUOUS
Status: CANCELLED | OUTPATIENT
Start: 2022-08-18

## 2022-08-24 ENCOUNTER — HOSPITAL ENCOUNTER (OUTPATIENT)
Dept: CARDIOLOGY | Facility: CLINIC | Age: 72
Discharge: HOME | End: 2022-08-24
Attending: THORACIC SURGERY (CARDIOTHORACIC VASCULAR SURGERY)
Payer: COMMERCIAL

## 2022-08-24 ENCOUNTER — HOSPITAL ENCOUNTER (OUTPATIENT)
Dept: ULTRASOUND IMAGING | Facility: CLINIC | Age: 72
Discharge: HOME | End: 2022-08-24
Attending: THORACIC SURGERY (CARDIOTHORACIC VASCULAR SURGERY)
Payer: COMMERCIAL

## 2022-08-24 ENCOUNTER — HOSPITAL ENCOUNTER (OUTPATIENT)
Dept: RADIOLOGY | Facility: CLINIC | Age: 72
Discharge: HOME | End: 2022-08-24
Attending: THORACIC SURGERY (CARDIOTHORACIC VASCULAR SURGERY)
Payer: COMMERCIAL

## 2022-08-24 DIAGNOSIS — I25.10 CORONARY ARTERY DISEASE INVOLVING NATIVE HEART, UNSPECIFIED VESSEL OR LESION TYPE, UNSPECIFIED WHETHER ANGINA PRESENT: ICD-10-CM

## 2022-08-24 DIAGNOSIS — R06.02 SHORTNESS OF BREATH: ICD-10-CM

## 2022-08-24 LAB — LVEF ECHO: NORMAL

## 2022-08-24 PROCEDURE — 71046 X-RAY EXAM CHEST 2 VIEWS: CPT

## 2022-08-24 PROCEDURE — 93306 TTE W/DOPPLER COMPLETE: CPT

## 2022-08-24 PROCEDURE — 93306 TTE W/DOPPLER COMPLETE: CPT | Mod: 26 | Performed by: INTERNAL MEDICINE

## 2022-08-24 PROCEDURE — 93970 EXTREMITY STUDY: CPT

## 2022-08-25 ENCOUNTER — TELEPHONE (OUTPATIENT)
Dept: CARDIOLOGY | Facility: CLINIC | Age: 72
End: 2022-08-25

## 2022-08-25 NOTE — TELEPHONE ENCOUNTER
Letter by Katarzyna Felder RN on 8/25/2022                  Cardiovascular Surgery     [facility]     CARDIOVASCULAR SURGERY INSTRUCTIONS     Your Heart Surgery is scheduled with Dr. Bradshaw on Friday, September 2.  Please arrive at 9:00 AM for your surgery scheduled at 2:00 PM.     Here are instructions for your upcoming surgery and clinic visit if scheduled.     Report to the information desk at the main entrance of [facility] at [address] [place] [address]. When you walk in the main entrance of the hospital, it is directly in front of you. Ask for an escort or the  can help direct you. You will then be escorted or directed to the Surgical Admission Unit (DOUG) on the second floor for your surgery preparation. You have been pre-registered.      Family/friends will be called from the OR with updates 1-2 times during the surgery.  After the surgery is completed, the surgeon will speak to your family members or friends face to face or by calling them.       At this time due to COVID-19, only two visitors at a time will be allowed per adult patient for the patient's hospital admission.  Surgical patients can have one visitor only during the preoperative phase, and one person may escort an adult patient to their outpatient clinic appointments.  All visitors will be screened, each time they visit, and must pass screening before entry.  Individuals who are sick and not seeking care or have known exposure to COVID-19 are not allowed to visit.  Visitors under the age of 18 are not allowed to enter. Hospital visiting hours are 8:00 am to 8:30 pm. Visiting policies will be strictly enforced.  This policy is subject to change as the COVID virus continues to evolve.      You will spend approximately 5 - 7 days in the hospital, depending on how quickly you recover.       COVID-19 TESTING  You will be required to undergo COVID-19 PCR testing prior to your surgery.  Please call  [phone] ([phone]) to schedule an appointment. You must schedule it WITHIN 4 DAYS of your surgery. A COVID-19 PCR test performed more than 4 days prior will not be counted as your pre-op COVID-19 test.  If you get your COVID PCR test through another lab, the lab should fax the results to [phone].     After the COVID test, please protect yourself by following the CDC recommendations for disease prevention.  Wash your hands regularly with soap and water and use hand  if soap and water is not available, wear a face mask. Separate yourself from others by 6 feet and keep your hands away from your face.      INSTRUCTIONS PRIOR TO SURGERY     MEDICATIONS:  ASPIRIN is okay to take on the day of your surgery if you are having bypass surgery. Do not take your aspirin on your day of surgery if you are scheduled for valve or aortic surgery. If you do not take aspirin, do not start aspirin unless instructed otherwise. Take your other medications as you normally would until the day of surgery unless instructed otherwise.       IF you are taking a blood thinner, (Coumadin, Warfarin, Plavix, Pradaxa, Effient, Brilinta, Pletal, Eliquis, Xarelto or other antiplatelet), please inform your surgery team as soon as possible as  these medications may need to be stopped for several days (3-7) prior to your surgery.     STOP TAKING these medications. STOP ALL ANTIINFLAMMATORY MEDICATIONS: (Ibuprofen, Aleve, Advil, Celebrex, Votaren, Ketoprofen, and Naproxen).  Stop ALL SUPPLEMENTS 10 days prior to your surgery. This includes stopping Co-Q 10, vitamin E and all fish oil supplements.   Please check the labels on any OTC eye vitamins you may be taking.  They often contain vitamin E and should be stopped 10 days prior to surgery.      MEDICATIONS THE MORNING OF SURGERY:  Take your ASPIRIN the morning of your surgery with a drink of clear liquid.  Please tell your anesthesiologist what medication you took and at what  time.     DIABETIC  INSTRUCTIONS:  If your primary care has given you instructions, please follow those.  Otherwise  No oral diabetic medication the morning of surgery.  If you take long acting insulin in the evening, only take half of your dose. We will check your glucose upon arrival.     DO NOT EAT ANY SOLID FOODS AFTER MIDNIGHT or the morning of your surgery. NO MILK, MILK PRODUCTS, SMOOTHIES 8 HOURS PRIOR TO SURGERY.      DO NOT EAT ANYTHING, however you may have any clear liquids; black coffee (sugar okay), clear tea, water, sprite, ginger ale, apple juice, Gatorade or any clear liquid up to two hours before your surgery time. (No robel tea) No milk, or milk products, no smoothies or juice with pulp.        HISTORY AND PHYSICAL:  You do NOT need to see your primary care physician prior to surgery if you have seen your surgeon within 30 days of your surgery date.  LABS and IMAGING  All blood work, tests, and procedures must be within 30 days of your surgery date.  You do NOT need to fast for the blood work.       You have a Pre-Admission Testing (PAT) appointment beginning at 8:00 AM to 10:00 AM on Friday, August 26 in the Surgical Admission Unit (DOUG) at [facility], [facility] [place] [address]. Please check in at the information desk in main entrance lobby.  You will be directed to the second floor.  Radiology is located downstairs in the lower level of the hospital.   Your schedule is as follows:     8/24 Echocardiogram at 8:00 AM - [address], [place], [place]  8/24 Vein mapping at 9:15 AM - [facility]  8/24 Chest xray at 10:25 AM - [facility]    BELONGINGS  Do not bring personal belongings, jewelry, money, valuables, toiletries or medications to the hospital the morning of your surgery. You may pack a bag and give it to a family member or friend to bring the following day or when needed.  Things you may want to pack or have brought to you later may include slippers/shoes  with a sole that are easy to take on/off, and comfy pajama bottoms to wear while walking the halls of the hospital.  Please remove all jewelry, including body piercings. Cautery instruments are used during surgery that may pose a risk of burns if a body piercing is left in during surgery.      Do bring a photo ID and insurance card.  A copy of your health care directives, if you have one.  Glasses and hearing aids (bring cases).  You cannot wear contact lenses during the surgery.  Inhaler(s) and eye drops if you use them, tell the staff about them when you arrive. Bring your CPAP machine or breathing device, if you use them.  If you have a pacemaker or ICD (cardiac defibrillator) bring the ID card.  If you have an implanted stimulator, bring the remote control.  If you are a legal guardian bring a copy of the certified (court-stamped) guardianship papers.      Call Juani our surgical scheduler, with any questions or concerns about scheduling. She can be reached by phone at [phone] between 8:00 AM and 4:00 PM Monday through Friday.   If you have questions about your medications, test results or have a change in your health status, call your surgery coordinator ALEX Colón during regular business hours.    Call Katarzyna or the afterhours number ([phone]) if you develop any of the following symptoms: fever, cough, shortness of breath, sore throat, runny or stuffy nose, muscle or body aches, headaches, fatigue, vomiting or diarrhea.       On weekends or after 4:00 PM, please call [phone] and ask the  to page the Cardiovascular Surgery staff on call.      PLEASE NOTE, there are times elective surgery (like yours) needs to be rescheduled due to unplanned emergency, transplant, or urgent surgeries. Your surgery may also be postponed or cancelled if there are no ICU beds available in the hospital.  If that should happen, your surgery will be rescheduled as quickly as possible. Our surgery team  appreciates your understanding.      Please call me with any questions.  Thank you,  Katarzyna Felder RN  Cardiovascular Surgery Coordinator  [phone]  Direct Phone  [phone]  Fax

## 2022-08-26 ENCOUNTER — ANESTHESIA EVENT (OUTPATIENT)
Dept: SURGERY | Facility: HOSPITAL | Age: 72
DRG: 236 | End: 2022-08-26
Payer: COMMERCIAL

## 2022-08-26 ENCOUNTER — HOSPITAL ENCOUNTER (OUTPATIENT)
Dept: SURGERY | Facility: HOSPITAL | Age: 72
Discharge: HOME | End: 2022-08-26
Admitting: THORACIC SURGERY (CARDIOTHORACIC VASCULAR SURGERY)
Payer: COMMERCIAL

## 2022-08-26 VITALS
HEART RATE: 88 BPM | RESPIRATION RATE: 20 BRPM | SYSTOLIC BLOOD PRESSURE: 135 MMHG | BODY MASS INDEX: 26.22 KG/M2 | WEIGHT: 204.2 LBS | TEMPERATURE: 98.8 F | DIASTOLIC BLOOD PRESSURE: 75 MMHG

## 2022-08-26 DIAGNOSIS — I99.8 OTHER DISORDER OF CIRCULATORY SYSTEM: ICD-10-CM

## 2022-08-26 DIAGNOSIS — R79.9 ABNORMAL FINDING OF BLOOD CHEMISTRY, UNSPECIFIED: ICD-10-CM

## 2022-08-26 DIAGNOSIS — I25.10 CORONARY ARTERY DISEASE INVOLVING NATIVE HEART, UNSPECIFIED VESSEL OR LESION TYPE, UNSPECIFIED WHETHER ANGINA PRESENT: ICD-10-CM

## 2022-08-26 LAB
ABO/RH(D): NORMAL
ALBUMIN UR-MCNC: NEGATIVE MG/DL
ANTIBODY SCREEN: NEGATIVE
APPEARANCE UR: CLEAR
APTT PPP: 26 SECONDS (ref 22–38)
BILIRUB UR QL STRIP: NEGATIVE
COLOR UR AUTO: YELLOW
GLUCOSE UR STRIP-MCNC: NEGATIVE MG/DL
HBA1C MFR BLD: 6.1 %
HGB UR QL STRIP: NEGATIVE
INR PPP: 1.11 (ref 0.85–1.15)
KETONES UR STRIP-MCNC: NEGATIVE MG/DL
LEUKOCYTE ESTERASE UR QL STRIP: NEGATIVE
MAGNESIUM SERPL-MCNC: 2 MG/DL (ref 1.8–2.6)
MRSA DNA SPEC QL NAA+PROBE: NEGATIVE
MUCOUS THREADS #/AREA URNS LPF: PRESENT /LPF
NITRATE UR QL: NEGATIVE
PH UR STRIP: 5.5 [PH] (ref 5–7)
PREALB SERPL IA-MCNC: 19 MG/DL (ref 19–38)
RBC URINE: 0 /HPF
SA TARGET DNA: NEGATIVE
SP GR UR STRIP: 1.01 (ref 1–1.03)
SPECIMEN EXPIRATION DATE: NORMAL
UROBILINOGEN UR STRIP-MCNC: <2 MG/DL
WBC URINE: 0 /HPF

## 2022-08-26 PROCEDURE — 36415 COLL VENOUS BLD VENIPUNCTURE: CPT | Performed by: THORACIC SURGERY (CARDIOTHORACIC VASCULAR SURGERY)

## 2022-08-26 PROCEDURE — 81001 URINALYSIS AUTO W/SCOPE: CPT | Performed by: THORACIC SURGERY (CARDIOTHORACIC VASCULAR SURGERY)

## 2022-08-26 PROCEDURE — 83735 ASSAY OF MAGNESIUM: CPT | Performed by: THORACIC SURGERY (CARDIOTHORACIC VASCULAR SURGERY)

## 2022-08-26 PROCEDURE — 85730 THROMBOPLASTIN TIME PARTIAL: CPT | Performed by: THORACIC SURGERY (CARDIOTHORACIC VASCULAR SURGERY)

## 2022-08-26 PROCEDURE — 86901 BLOOD TYPING SEROLOGIC RH(D): CPT | Performed by: THORACIC SURGERY (CARDIOTHORACIC VASCULAR SURGERY)

## 2022-08-26 PROCEDURE — 87641 MR-STAPH DNA AMP PROBE: CPT | Performed by: THORACIC SURGERY (CARDIOTHORACIC VASCULAR SURGERY)

## 2022-08-26 PROCEDURE — 85610 PROTHROMBIN TIME: CPT | Performed by: THORACIC SURGERY (CARDIOTHORACIC VASCULAR SURGERY)

## 2022-08-26 PROCEDURE — 84134 ASSAY OF PREALBUMIN: CPT | Performed by: THORACIC SURGERY (CARDIOTHORACIC VASCULAR SURGERY)

## 2022-08-26 PROCEDURE — 83036 HEMOGLOBIN GLYCOSYLATED A1C: CPT | Performed by: THORACIC SURGERY (CARDIOTHORACIC VASCULAR SURGERY)

## 2022-08-26 PROCEDURE — 86923 COMPATIBILITY TEST ELECTRIC: CPT | Performed by: THORACIC SURGERY (CARDIOTHORACIC VASCULAR SURGERY)

## 2022-08-26 RX ORDER — CHOLECALCIFEROL (VITAMIN D3) 50 MCG
2 TABLET ORAL DAILY
COMMUNITY

## 2022-08-26 RX ORDER — GLIPIZIDE 5 MG/1
5 TABLET, FILM COATED, EXTENDED RELEASE ORAL
COMMUNITY

## 2022-08-26 RX ORDER — DEXMEDETOMIDINE HYDROCHLORIDE 4 UG/ML
.2-1 INJECTION, SOLUTION INTRAVENOUS CONTINUOUS
Status: CANCELLED | OUTPATIENT
Start: 2022-09-02

## 2022-08-26 NOTE — ANESTHESIA PREPROCEDURE EVALUATION
Anesthesia Pre-Procedure Evaluation    Patient: Nghia Hopper   MRN: [id number] : 1950        Procedure : Procedure(s):  CORONARY ARTERY BYPASS GRAFT (CABG), ENDOSCOPIC VESSEL PROCUREMENT, INTERNAL MAMMARY ARTERY HARVEST, ANESTHESIA TRANSESOPHAGEAL ECHOCARDIOGRAM          Past Medical History:   Diagnosis Date     BPH (benign prostatic hyperplasia)      Cerebral infarction (H)      Depression      Diabetes (H)      Diabetes mellitus (H)      Hyperlipidemia LDL goal <100      DANIELLE (obstructive sleep apnea)       Past Surgical History:   Procedure Laterality Date     CV CORONARY ANGIOGRAM N/A 8/10/2022    Procedure: Coronary Angiogram;  Surgeon: Annette Leavitt MD;  Location: [facility] CATH LAB CV     CV LEFT HEART CATH N/A 8/10/2022    Procedure: Left Heart Catheterization;  Surgeon: Annette Leavitt MD;  Location: [facility] CATH LAB CV     tendon right wrist Right       No Known Allergies   Social History     Tobacco Use     Smoking status: Never Smoker     Smokeless tobacco: Never Used   Substance Use Topics     Alcohol use: Not Currently      Wt Readings from Last 1 Encounters:   22 92.6 kg (204 lb 3.2 oz)        Anesthesia Evaluation   Pt has had prior anesthetic.     No history of anesthetic complications       ROS/MED HX  ENT/Pulmonary:     (+) sleep apnea, mild,     Neurologic:     (+) CVA,     Cardiovascular:     (+) Dyslipidemia --CAD ---CHF Last EF: 45% (MRI) WATTERS. Previous cardiac testing   Echo: Date:  Results:  Name: NGHIA HOPPER  MRN: [id number]  : 1950  Study Date: 2022 08:13 AM  Age: 71 yrs  Gender: Male  Patient Location: [facility]  Reason For Study: Coronary artery disease involving native heart, unspecified  vess  Ordering Physician: MELODY GUERRIER  Referring Physician: MELODY GUERRIER  Performed By: JONG     BSA: 2.2 m2  Height: 74 in  Weight: 206 lb  HR: 76  BP: 100/64  mmHg  ______________________________________________________________________________  Procedure  Complete Echo Adult.  ______________________________________________________________________________  Interpretation Summary     The left ventricle is normal in size.  There is mild concentric left ventricular hypertrophy.  The visual ejection fraction is 55-60%.  Normal left ventricular wall motion  Normal right ventricle size and systolic function.  There is no comparison study available.  ______________________________________________________________________________  Left Ventricle  The left ventricle is normal in size. There is mild concentric left  ventricular hypertrophy. Diastolic Doppler findings (E/E' ratio and/or other  parameters) suggest left ventricular filling pressures are normal. The visual  ejection fraction is 55-60%. Normal left ventricular wall motion.     Right Ventricle  Normal right ventricle size and systolic function. TAPSE is normal, which is  consistent with normal right ventricular systolic function.     Atria  Normal left atrial size. Right atrial size is normal.     Mitral Valve  There is mild mitral annular calcification. There is trace mitral  regurgitation. There is no mitral valve stenosis.     Tricuspid Valve  Tricuspid valve leaflets appear normal. There is trace to mild tricuspid  regurgitation. There is no tricuspid stenosis.     Aortic Valve  The aortic valve is trileaflet. No aortic regurgitation is present. No aortic  stenosis is present.     Pulmonic Valve  The pulmonic valve is not well visualized. There is trace pulmonic valvular  regurgitation. There is no pulmonic valvular stenosis.     Vessels  The aorta root is normal. Normal size ascending aorta.     Pericardium  There is no pericardial effusion.  Stress Test: Date:  Results:  MRN:                 [id number]                                  Name:          NGHIA DIGGS:                   1950                                  Scan Date:   2022-07-27 09:48:07                                   Electronically signed by Toby Blackburn 2022-Jul-29 13:08:49     VITALS   ==========================================================================================================     HEIGHT: 74.02 in    (188.00 cm)  WEIGHT: 201.00 lbs    (91.17 kgs)  BSA: 2.18 m^2     SUMMARY   ==========================================================================================================     1.  Pharmacological Regadenoson stress cardiac MRI is positive for a small area of inducible myocardial  ischemia in the [id number] anteroseptal wall. This is in the territory of the left anterior descending  artery.  2.  No evidence of prior myocardial infarction, nonvascular scarring or fibrosis, or infiltrative disease.  3.  Normal left ventricular size and wall thickness. Systolic function is low normal. The quantified left  ventricular ejection fraction is 51%.   4.  Normal right ventricular size and systolic function.  The quantified right ventricular ejection  fraction is 53%.   5.  No significant valvular abnormalities.  ==========================================================================================================  REPORT FINDINGS:     LEFT VENTRICLE: LV wall thickness is normal. LV cavity size is normal. The native (pre-contrast) myocardial T1 value  measures 1014 ms. There is no LV mass/thrombus. LV systolic function is normal. Quantitative LVEF 51 %.     VIABILITY: Hyperenhancement is normal.     RIGHT VENTRICLE: RV wall thickness is normal. RV cavity size is normal. RV systolic function is normal. There is no RV  mass/thrombus. Quantitative RVEF 53 %.     LA/RA SEPTUM: The atrial septum appears normal.     LEFT ATRIUM: LA cavity size is normal.     RIGHT ATRIUM: RA cavity size is normal.     PERICARDIUM: Pericardium is normal. There is no pericardial effusion.     PLEURAL EFFUSION: There is  no pleural effusion.     AORTIC VALVE: Aortic valve is trileaflet. There is no aortic regurgitation. There is no aortic stenosis.     MITRAL VALVE: Mitral valve leaflets are normal. There is no mitral regurgitation. There is no mitral stenosis.     TRICUSPID VALVE: Tricuspid valve leaflets are normal. There is no tricuspid regurgitation. There is no tricuspid stenosis.     PULMONIC VALVE: Pulmonic valve leaflets are normal. There is no pulmonic regurgitation. There is no pulmonic stenosis.     AORTIC ROOT: The aortic root is normal.     CHEST: Normal thoracic aortic size and its major branches. The ascending aorta measures 3.3 cm.        ECG Reviewed:  Date: Results:    Cath:  Date: 8/10 Results:  Conclusion      Exertional dyspnea and chest heaviness in a diabetic man with an abnormal stress MRI.    Mild left main disease.    The LAD has mild proximal disease and is occluded in the mid segment after the take off of a large second diagonal. D2 has a tight ostial stenosis itself. The apical and distal LAD fill slowly via left and right sided collaterals. It is small in caliber and likely underfilled based on the larger size of his other arteries.    The ramus is a small artery with no appreciable disease.    The circumflex feeds a good sized OM-3. There is moderate stenosis in the mid circumflex at the branch point of OM-2.    The RCA is a large artery with just mild PDA disease.    LV EDP 14-17 mmhg. LV-Ao gradient by catheter pullback was < 5 mmHg.            Recommendations    General Recommendations:  - Recommended follow up with doctor Dr. Grande post CABG.   - Arterial sheath removed from radial artery with TR Band placement.   - Recommend cardiac rehabilitation.     Medications:  - Continue high dose statin therapy indefinitely.   - Risk factor management for atherosclerosis.   - Recommend nitrate therapy for angina and CAD.     Surgical Therapy:   - Will obtain cardiovascular surgery consultation regarding  coronary artery bypass grafting.      Coronary Findings      Diagnostic  Dominance: Right    Left Main  Mid LM to Ost LAD lesion is 20% stenosed.  Left Anterior Descending  Collaterals  Dist LAD filled by collaterals from 2nd Diag.    Collaterals  Dist LAD filled by collaterals from 2nd Sept.    Collaterals  Dist LAD filled by collaterals from RPDA.    Prox LAD lesion is 25% stenosed.  Mid LAD lesion is 100% stenosed. The lesion is severely calcified.  Second Diagonal Branch  2nd Diag lesion is 80% stenosed.  Left Circumflex  Ost Cx to Prox Cx lesion is 20% stenosed.  Mid Cx to Dist Cx lesion is 50% stenosed.  Second Obtuse Marginal Branch  The vessel is small.  Right Coronary Artery  Right Posterior Descending Artery  RPDA lesion is 25% stenosed.      Intervention      No interventions have been documented.          Hemodynamics    Left ventricular filling pressures are mildly elevated.                                                      Pressures Phase: Baseline     Time Systolic (mmHg) Diastolic (mmHg) Mean (mmHg) A Wave (mmHg) V Wave (mmHg) EDP (mmHg) Max dp/dt (mmHg/sec) HR (bpm) Content (mL/dL) SAT (%)  AO Pressures  8:52 AM   93     64     77         73        9:01 AM   115     64     81         69      LV Pressures  9:00 AM   129         17      69        9:01 AM   125         17      69        9:01 AM   122         14      69                    Valve Results Phase: Baseline     Time Valve  Mean Gradient (mmHg) Area (cm2) Area Index (cm2/m2) Sep per Dfp  Flow (cc)  Valve  8:19 AM       16.77 sec/beat       9:01 AM   Aortic     1.56       16.77        (-) syncope, pacemaker, arrhythmias, irregular heartbeat/palpitations, valvular problems/murmurs, pacemaker and ICD   METS/Exercise Tolerance:     Hematologic:     (+) anemia,     Musculoskeletal:  - neg musculoskeletal ROS     GI/Hepatic:  - neg GI/hepatic ROS     Renal/Genitourinary:  - neg Renal ROS     Endo:     (+) type II DM, Diabetic complications:  neuropathy.     Psychiatric/Substance Use:     (+) psychiatric history depression     Infectious Disease:  - neg infectious disease ROS     Malignancy:  - neg malignancy ROS     Other:            Physical Exam    Airway  airway exam normal      Mallampati: II   TM distance: > 3 FB   Neck ROM: full   Mouth opening: > 3 cm    Respiratory Devices and Support         Dental  no notable dental history         Cardiovascular   cardiovascular exam normal       Rhythm and rate: regular and normal     Pulmonary   pulmonary exam normal        breath sounds clear to auscultation           OUTSIDE LABS:  CBC:   Lab Results   Component Value Date    WBC 7.6 08/10/2022    WBC 6.4 09/06/2018    HGB 13.2 (L) 08/10/2022    HGB 13.8 (L) 09/06/2018    HCT 38.2 (L) 08/10/2022    HCT 41.7 09/06/2018     08/10/2022     09/06/2018     BMP:   Lab Results   Component Value Date     08/10/2022     08/03/2022    POTASSIUM 4.3 08/10/2022    POTASSIUM 4.9 08/03/2022    CHLORIDE 104 08/10/2022    CHLORIDE 101 08/03/2022    CO2 26 08/10/2022    CO2 25 08/03/2022    BUN 17 08/10/2022    BUN 14.4 08/03/2022    CR 0.85 08/10/2022    CR 0.93 08/03/2022     08/10/2022     (H) 08/03/2022     COAGS:   Lab Results   Component Value Date    PTT 26 08/26/2022    INR 1.11 08/26/2022     POC: No results found for: BGM, HCG, HCGS  HEPATIC:   Lab Results   Component Value Date    ALBUMIN 3.9 08/03/2022    PROTTOTAL 6.3 (L) 08/03/2022    ALT 25 08/03/2022    AST 18 08/03/2022    ALKPHOS 88 08/03/2022    BILITOTAL 0.5 08/03/2022     OTHER:   Lab Results   Component Value Date    A1C 6.1 (H) 08/26/2022    EVELYNE 8.8 08/10/2022    MAG 2.0 08/26/2022    TSH 2.12 09/17/2021    CRP 0.2 03/26/2019    SED 5 03/26/2019       Anesthesia Plan    ASA Status:  3   NPO Status:  NPO Appropriate    Anesthesia Type: General.     - Airway: ETT   Induction: Intravenous.   Maintenance: Balanced.   Techniques and Equipment:     - Lines/Monitors:  2nd IV, Arterial Line, Central Line, PAC, CVP, KAYE            KAYE Absolute Contra-indication: NONE            KAYE Relative Contra-indication: NONE     - Blood: Blood in Room, PRBC, Cell Saver, PLT, Cryo, T&C, FFP     - Drips/Meds: Ketamine, Phenylephrine, Epinephrine, Nicardipine, Tranexamic acid, Dexmed. infusion     Consents    Anesthesia Plan(s) and associated risks, benefits, and realistic alternatives discussed. Questions answered and patient/representative(s) expressed understanding.     - Discussed: Risks, Benefits and Alternatives for BOTH SEDATION and the PROCEDURE were discussed     - Discussed with:  Patient      - Extended Intubation/Ventilatory Support Discussed: No.      - Patient is DNR/DNI Status: No    Use of blood products discussed: Yes.     - Discussed with: Patient.     Postoperative Care    Pain management: IV analgesics, Oral pain medications, Multi-modal analgesia.   PONV prophylaxis: Ondansetron (or other 5HT-3), Dexamethasone or Solumedrol, Background Propofol Infusion     Comments:    Other Comments: 2 PIV, [place], CVC, SGC.  Dexmedetomidine, Aprotinin; Epi, Norepi, Nicardipine, PE infusions.  Ephedrine, Calcium, Magnesium PRN.  Fast track anesthesia.  UF on CPB.  KAYE.            Bernard Rucker MD

## 2022-08-26 NOTE — PHARMACY-ADMISSION MEDICATION HISTORY
Pharmacy Note - Admission Medication History    Pertinent Provider Information: n/a   ______________________________________________________________________    Prior To Admission (PTA) med list completed and updated in EMR.       PTA Med List   Medication Sig Last Dose     aspirin 81 MG EC tablet [ASPIRIN 81 MG EC TABLET] Take 81 mg by mouth daily.      atorvastatin (LIPITOR) 80 MG tablet Take 1 tablet (80 mg) by mouth daily      finasteride (PROSCAR) 5 MG tablet Take 5 mg by mouth daily      FLUoxetine (PROZAC) 20 MG capsule TAKE 3 CAPSULES BY MOUTH IN THE MORNING      glipiZIDE (GLUCOTROL XL) 5 MG 24 hr tablet Take 5 mg by mouth 2 times daily (before meals)      Insulin Glargine-Lixisenatide (SOLIQUA SC) Inject 0-25 Units Subcutaneous daily 0-25 Units depending on blood sugar reading      isosorbide mononitrate (IMDUR) 30 MG 24 hr tablet Take 1 tablet (30 mg) by mouth daily      metFORMIN (GLUCOPHAGE) 1000 MG tablet [METFORMIN (GLUCOPHAGE) 1000 MG TABLET] Take 1,000 mg by mouth 2 (two) times a day with meals.      nitroGLYcerin (NITROSTAT) 0.4 MG sublingual tablet For chest pain place 1 tablet under the tongue every 5 minutes for 3 doses. If symptoms persist 5 minutes after 1st dose call 911.      tamsulosin (FLOMAX) 0.4 mg Cp24 Take 0.4 mg by mouth every evening      UNABLE TO FIND Take by mouth daily MEDICATION NAME:     Plexus X Factor - 1 capsul Daily  Plexus Probio 5 - 1 capsule Daily   Plexus Bio Cleanse - 1 capsule daily      vitamin D3 (CHOLECALCIFEROL) 50 mcg (2000 units) tablet Take 2 tablets by mouth daily        Information source(s): Patient and CareEverywhere/SureScripts    Method of interview communication: in-person with surgical mask and faceshield    Patient was asked about OTC/herbal products specifically.  PTA med list reflects this.    Based on the pharmacist's assessment, the PTA med list information appears reliable    Allergies were reviewed, assessed, and updated with the patient.       Patient does not use any multi-dose medications prior to admission.     Thank you for the opportunity to participate in the care of this patient.      Katarzyna Mace [facility]     8/26/2022     1:21 PM

## 2022-08-26 NOTE — PROGRESS NOTES
SPIRITUAL HEALTH SERVICES NOTE  [facility]/DOUG; Pre-Op    SPIRITUAL ASSESSMENT  No major surgeries before  Identifies support from wife, family and leydi community  Enjoys singing, traveling and spending time with his grandkids  His Zoroastrian leydi is a source of meaning, purpose, and comfort  No concerns identified during this visit    CARE PROVIDED  Empathic listening and presence   Explored/identified sources of strength/support  Encouraged being patient with himself as he recovers  Prayer shared     SPIRITUAL CARE NOTE  Pre-surgical visit with Corey. He is scheduled to have a CABG on Friday, September 2nd. Corey has noticed symptoms in previous months, including fatigue and SOB. He has questions about recovery afterwards, but denies any major concerns. He identifies his Zoroastrian leydi as a significant source of strength peace. Bala plans to recover at home after discharge. He is supported by his wife Justice, his children and his leydi community. He enjoys singing in a quartet, traveling, and spending time with his grandchildren. He his hopeful that this surgery will allow him to be active for many more years. I encouraged him to be patient with himself as his body recovers. Prayer shared.     Time Spent with Patient/Family: 15 minutes    Plan of Care: Will plan to follow-up with Corey and his wife after surgery.     Katarzyna Talbert M.Div.      Office: [phone] (for non-urgent requests)  Please Vocera or page through [facility] for time-sensitive requests

## 2022-08-30 ENCOUNTER — LAB (OUTPATIENT)
Dept: LAB | Facility: CLINIC | Age: 72
End: 2022-08-30
Payer: COMMERCIAL

## 2022-08-30 DIAGNOSIS — Z20.822 ENCOUNTER FOR LABORATORY TESTING FOR COVID-19 VIRUS: ICD-10-CM

## 2022-08-30 PROCEDURE — U0005 INFEC AGEN DETEC AMPLI PROBE: HCPCS

## 2022-08-30 PROCEDURE — U0003 INFECTIOUS AGENT DETECTION BY NUCLEIC ACID (DNA OR RNA); SEVERE ACUTE RESPIRATORY SYNDROME CORONAVIRUS 2 (SARS-COV-2) (CORONAVIRUS DISEASE [COVID-19]), AMPLIFIED PROBE TECHNIQUE, MAKING USE OF HIGH THROUGHPUT TECHNOLOGIES AS DESCRIBED BY CMS-2020-01-R: HCPCS

## 2022-08-31 LAB — SARS-COV-2 RNA RESP QL NAA+PROBE: NEGATIVE

## 2022-09-02 ENCOUNTER — HOSPITAL ENCOUNTER (INPATIENT)
Facility: HOSPITAL | Age: 72
LOS: 5 days | Discharge: HOME | DRG: 236 | End: 2022-09-07
Attending: THORACIC SURGERY (CARDIOTHORACIC VASCULAR SURGERY) | Admitting: THORACIC SURGERY (CARDIOTHORACIC VASCULAR SURGERY)
Payer: COMMERCIAL

## 2022-09-02 ENCOUNTER — ANESTHESIA (OUTPATIENT)
Dept: SURGERY | Facility: HOSPITAL | Age: 72
DRG: 236 | End: 2022-09-02
Payer: COMMERCIAL

## 2022-09-02 ENCOUNTER — MEDICAL CORRESPONDENCE (OUTPATIENT)
Dept: HEALTH INFORMATION MANAGEMENT | Facility: CLINIC | Age: 72
End: 2022-09-02

## 2022-09-02 ENCOUNTER — APPOINTMENT (OUTPATIENT)
Dept: RADIOLOGY | Facility: HOSPITAL | Age: 72
DRG: 236 | End: 2022-09-02
Attending: PHYSICIAN ASSISTANT
Payer: COMMERCIAL

## 2022-09-02 ENCOUNTER — DOCUMENTATION ONLY (OUTPATIENT)
Dept: OTHER | Facility: CLINIC | Age: 72
End: 2022-09-02

## 2022-09-02 DIAGNOSIS — I25.10 CORONARY ARTERY DISEASE INVOLVING NATIVE HEART, UNSPECIFIED VESSEL OR LESION TYPE, UNSPECIFIED WHETHER ANGINA PRESENT: ICD-10-CM

## 2022-09-02 DIAGNOSIS — I48.91 POSTOPERATIVE ATRIAL FIBRILLATION (H): ICD-10-CM

## 2022-09-02 DIAGNOSIS — Z95.1 S/P CABG (CORONARY ARTERY BYPASS GRAFT): Primary | ICD-10-CM

## 2022-09-02 DIAGNOSIS — I97.89 POSTOPERATIVE ATRIAL FIBRILLATION (H): ICD-10-CM

## 2022-09-02 LAB
ALBUMIN SERPL-MCNC: 2.6 G/DL (ref 3.5–5)
ALP SERPL-CCNC: 51 U/L (ref 45–120)
ALT SERPL W P-5'-P-CCNC: 15 U/L (ref 0–45)
ANION GAP SERPL CALCULATED.3IONS-SCNC: 8 MMOL/L (ref 5–18)
APTT PPP: 44 SECONDS (ref 22–38)
APTT PPP: 44 SECONDS (ref 22–38)
AST SERPL W P-5'-P-CCNC: 17 U/L (ref 0–40)
BASE EXCESS BLDA CALC-SCNC: -2.1 MMOL/L
BASE EXCESS BLDA CALC-SCNC: -3 MMOL/L
BASE EXCESS BLDV CALC-SCNC: -1.6 MMOL/L
BILIRUB SERPL-MCNC: 0.5 MG/DL (ref 0–1)
BLD PROD TYP BPU: NORMAL
BLD PROD TYP BPU: NORMAL
BLOOD COMPONENT TYPE: NORMAL
BLOOD COMPONENT TYPE: NORMAL
BUN SERPL-MCNC: 16 MG/DL (ref 8–28)
CALCIUM SERPL-MCNC: 8.6 MG/DL (ref 8.5–10.5)
CALCIUM, IONIZED MEASURED: 1.26 MMOL/L (ref 1.11–1.3)
CHLORIDE BLD-SCNC: 109 MMOL/L (ref 98–107)
CO2 SERPL-SCNC: 22 MMOL/L (ref 22–31)
CODING SYSTEM: NORMAL
CODING SYSTEM: NORMAL
COHGB MFR BLD: 99.5 % (ref 95–96)
COHGB MFR BLD: 99.7 % (ref 95–96)
CREAT SERPL-MCNC: 0.85 MG/DL (ref 0.7–1.3)
CROSSMATCH: NORMAL
CROSSMATCH: NORMAL
ERYTHROCYTE [DISTWIDTH] IN BLOOD BY AUTOMATED COUNT: 13.2 % (ref 10–15)
ERYTHROCYTE [DISTWIDTH] IN BLOOD BY AUTOMATED COUNT: 13.2 % (ref 10–15)
FIBRINOGEN PPP-MCNC: 257 MG/DL (ref 170–490)
FLOW: 3 LPM
GFR SERPL CREATININE-BSD FRML MDRD: >90 ML/MIN/1.73M2
GLUCOSE BLD-MCNC: 197 MG/DL (ref 70–125)
GLUCOSE BLDC GLUCOMTR-MCNC: 126 MG/DL (ref 70–99)
GLUCOSE BLDC GLUCOMTR-MCNC: 142 MG/DL (ref 70–99)
GLUCOSE BLDC GLUCOMTR-MCNC: 149 MG/DL (ref 70–99)
GLUCOSE BLDC GLUCOMTR-MCNC: 154 MG/DL (ref 70–99)
GLUCOSE BLDC GLUCOMTR-MCNC: 158 MG/DL (ref 70–99)
GLUCOSE BLDC GLUCOMTR-MCNC: 168 MG/DL (ref 70–99)
GLUCOSE BLDC GLUCOMTR-MCNC: 178 MG/DL (ref 70–99)
GLUCOSE BLDC GLUCOMTR-MCNC: 184 MG/DL (ref 70–99)
HCO3 BLD-SCNC: 22 MMOL/L (ref 23–29)
HCO3 BLD-SCNC: 24 MMOL/L (ref 23–29)
HCO3 BLDV-SCNC: 23 MMOL/L (ref 24–30)
HCT VFR BLD AUTO: 29.5 % (ref 40–53)
HCT VFR BLD AUTO: 31.2 % (ref 40–53)
HGB BLD-MCNC: 10.6 G/DL (ref 13.3–17.7)
HGB BLD-MCNC: 9.8 G/DL (ref 13.3–17.7)
INR PPP: 1.37 (ref 0.85–1.15)
INR PPP: 1.43 (ref 0.85–1.15)
ION CA PH 7.4: 1.26 MMOL/L (ref 1.11–1.3)
ISSUE DATE AND TIME: NORMAL
ISSUE DATE AND TIME: NORMAL
LACTATE SERPL-SCNC: 4.6 MMOL/L (ref 0.7–2)
MAGNESIUM SERPL-MCNC: 2.8 MG/DL (ref 1.8–2.6)
MCH RBC QN AUTO: 31.5 PG (ref 26.5–33)
MCH RBC QN AUTO: 31.6 PG (ref 26.5–33)
MCHC RBC AUTO-ENTMCNC: 33.2 G/DL (ref 31.5–36.5)
MCHC RBC AUTO-ENTMCNC: 34 G/DL (ref 31.5–36.5)
MCV RBC AUTO: 93 FL (ref 78–100)
MCV RBC AUTO: 95 FL (ref 78–100)
O2/TOTAL GAS SETTING VFR VENT: 40 %
OXYHGB MFR BLD: 97.7 % (ref 95–96)
OXYHGB MFR BLD: 98.1 % (ref 95–96)
OXYHGB MFR BLDV: 79.4 % (ref 70–75)
PCO2 BLD: 28 MM HG (ref 35–45)
PCO2 BLD: 47 MM HG (ref 35–45)
PCO2 BLDV: 40 MM HG (ref 35–50)
PEEP: 5 CM H2O
PH BLD: 7.31 [PH] (ref 7.37–7.44)
PH BLD: 7.49 [PH] (ref 7.37–7.44)
PH BLDV: 7.37 [PH] (ref 7.35–7.45)
PH: 7.39 (ref 7.35–7.45)
PHOSPHATE SERPL-MCNC: 2.7 MG/DL (ref 2.5–4.5)
PLATELET # BLD AUTO: 209 10E3/UL (ref 150–450)
PLATELET # BLD AUTO: 257 10E3/UL (ref 150–450)
PO2 BLD: 100 MM HG (ref 75–85)
PO2 BLD: 132 MM HG (ref 75–85)
PO2 BLDV: 44 MM HG (ref 25–47)
POTASSIUM BLD-SCNC: 3.5 MMOL/L (ref 3.5–5)
POTASSIUM BLD-SCNC: 3.5 MMOL/L (ref 3.5–5)
PROT SERPL-MCNC: 4.5 G/DL (ref 6–8)
RATE: 16 RR/MIN
RBC # BLD AUTO: 3.11 10E6/UL (ref 4.4–5.9)
RBC # BLD AUTO: 3.35 10E6/UL (ref 4.4–5.9)
SAO2 % BLDV: 80.4 % (ref 70–75)
SODIUM SERPL-SCNC: 139 MMOL/L (ref 136–145)
TEMPERATURE: 37 DEGREES C
TEMPERATURE: 37 DEGREES C
UNIT ABO/RH: NORMAL
UNIT ABO/RH: NORMAL
UNIT NUMBER: NORMAL
UNIT NUMBER: NORMAL
UNIT STATUS: NORMAL
UNIT STATUS: NORMAL
UNIT TYPE ISBT: 6200
UNIT TYPE ISBT: 6200
VENTILATION MODE: ABNORMAL
VENTILATOR TIDAL VOLUME: 650 ML
WBC # BLD AUTO: 15.4 10E3/UL (ref 4–11)
WBC # BLD AUTO: 24.2 10E3/UL (ref 4–11)

## 2022-09-02 PROCEDURE — 82803 BLOOD GASES ANY COMBINATION: CPT

## 2022-09-02 PROCEDURE — P9045 ALBUMIN (HUMAN), 5%, 250 ML: HCPCS | Performed by: THORACIC SURGERY (CARDIOTHORACIC VASCULAR SURGERY)

## 2022-09-02 PROCEDURE — 258N000003 HC RX IP 258 OP 636: Performed by: THORACIC SURGERY (CARDIOTHORACIC VASCULAR SURGERY)

## 2022-09-02 PROCEDURE — C1760 CLOSURE DEV, VASC: HCPCS | Performed by: THORACIC SURGERY (CARDIOTHORACIC VASCULAR SURGERY)

## 2022-09-02 PROCEDURE — 94002 VENT MGMT INPAT INIT DAY: CPT

## 2022-09-02 PROCEDURE — 99291 CRITICAL CARE FIRST HOUR: CPT | Mod: FT | Performed by: INTERNAL MEDICINE

## 2022-09-02 PROCEDURE — 33508 ENDOSCOPIC VEIN HARVEST: CPT | Mod: 59 | Performed by: THORACIC SURGERY (CARDIOTHORACIC VASCULAR SURGERY)

## 2022-09-02 PROCEDURE — 71045 X-RAY EXAM CHEST 1 VIEW: CPT

## 2022-09-02 PROCEDURE — 250N000011 HC RX IP 250 OP 636

## 2022-09-02 PROCEDURE — 33517 CABG ARTERY-VEIN SINGLE: CPT | Performed by: THORACIC SURGERY (CARDIOTHORACIC VASCULAR SURGERY)

## 2022-09-02 PROCEDURE — 250N000011 HC RX IP 250 OP 636: Performed by: ANESTHESIOLOGY

## 2022-09-02 PROCEDURE — 82805 BLOOD GASES W/O2 SATURATION: CPT | Performed by: INTERNAL MEDICINE

## 2022-09-02 PROCEDURE — 85610 PROTHROMBIN TIME: CPT | Performed by: THORACIC SURGERY (CARDIOTHORACIC VASCULAR SURGERY)

## 2022-09-02 PROCEDURE — 83605 ASSAY OF LACTIC ACID: CPT | Performed by: PHYSICIAN ASSISTANT

## 2022-09-02 PROCEDURE — 80053 COMPREHEN METABOLIC PANEL: CPT | Performed by: PHYSICIAN ASSISTANT

## 2022-09-02 PROCEDURE — 84132 ASSAY OF SERUM POTASSIUM: CPT | Performed by: PHYSICIAN ASSISTANT

## 2022-09-02 PROCEDURE — 250N000013 HC RX MED GY IP 250 OP 250 PS 637: Performed by: PHYSICIAN ASSISTANT

## 2022-09-02 PROCEDURE — 82805 BLOOD GASES W/O2 SATURATION: CPT | Performed by: THORACIC SURGERY (CARDIOTHORACIC VASCULAR SURGERY)

## 2022-09-02 PROCEDURE — 250N000012 HC RX MED GY IP 250 OP 636 PS 637: Performed by: THORACIC SURGERY (CARDIOTHORACIC VASCULAR SURGERY)

## 2022-09-02 PROCEDURE — 360N000079 HC SURGERY LEVEL 6, PER MIN: Performed by: THORACIC SURGERY (CARDIOTHORACIC VASCULAR SURGERY)

## 2022-09-02 PROCEDURE — 250N000009 HC RX 250: Performed by: ANESTHESIOLOGY

## 2022-09-02 PROCEDURE — 250N000011 HC RX IP 250 OP 636: Performed by: PHYSICIAN ASSISTANT

## 2022-09-02 PROCEDURE — 250N000011 HC RX IP 250 OP 636: Performed by: THORACIC SURGERY (CARDIOTHORACIC VASCULAR SURGERY)

## 2022-09-02 PROCEDURE — 021009W BYPASS CORONARY ARTERY, ONE ARTERY FROM AORTA WITH AUTOLOGOUS VENOUS TISSUE, OPEN APPROACH: ICD-10-PCS | Performed by: THORACIC SURGERY (CARDIOTHORACIC VASCULAR SURGERY)

## 2022-09-02 PROCEDURE — 200N000001 HC R&B ICU

## 2022-09-02 PROCEDURE — 410N000004: Performed by: THORACIC SURGERY (CARDIOTHORACIC VASCULAR SURGERY)

## 2022-09-02 PROCEDURE — 85730 THROMBOPLASTIN TIME PARTIAL: CPT | Performed by: PHYSICIAN ASSISTANT

## 2022-09-02 PROCEDURE — 85730 THROMBOPLASTIN TIME PARTIAL: CPT | Performed by: THORACIC SURGERY (CARDIOTHORACIC VASCULAR SURGERY)

## 2022-09-02 PROCEDURE — 85610 PROTHROMBIN TIME: CPT | Performed by: PHYSICIAN ASSISTANT

## 2022-09-02 PROCEDURE — 999N000141 HC STATISTIC PRE-PROCEDURE NURSING ASSESSMENT: Performed by: THORACIC SURGERY (CARDIOTHORACIC VASCULAR SURGERY)

## 2022-09-02 PROCEDURE — 410N000003 HC PER-PERFUSION 1ST 30 MIN: Performed by: THORACIC SURGERY (CARDIOTHORACIC VASCULAR SURGERY)

## 2022-09-02 PROCEDURE — 84132 ASSAY OF SERUM POTASSIUM: CPT

## 2022-09-02 PROCEDURE — 258N000003 HC RX IP 258 OP 636: Performed by: ANESTHESIOLOGY

## 2022-09-02 PROCEDURE — 272N000001 HC OR GENERAL SUPPLY STERILE: Performed by: THORACIC SURGERY (CARDIOTHORACIC VASCULAR SURGERY)

## 2022-09-02 PROCEDURE — 999N000259 HC STATISTIC EXTUBATION

## 2022-09-02 PROCEDURE — C1898 LEAD, PMKR, OTHER THAN TRANS: HCPCS | Performed by: THORACIC SURGERY (CARDIOTHORACIC VASCULAR SURGERY)

## 2022-09-02 PROCEDURE — 82805 BLOOD GASES W/O2 SATURATION: CPT | Performed by: PHYSICIAN ASSISTANT

## 2022-09-02 PROCEDURE — 82330 ASSAY OF CALCIUM: CPT | Performed by: PHYSICIAN ASSISTANT

## 2022-09-02 PROCEDURE — 33533 CABG ARTERIAL SINGLE: CPT | Performed by: THORACIC SURGERY (CARDIOTHORACIC VASCULAR SURGERY)

## 2022-09-02 PROCEDURE — 36592 COLLECT BLOOD FROM PICC: CPT | Performed by: THORACIC SURGERY (CARDIOTHORACIC VASCULAR SURGERY)

## 2022-09-02 PROCEDURE — P9016 RBC LEUKOCYTES REDUCED: HCPCS | Performed by: THORACIC SURGERY (CARDIOTHORACIC VASCULAR SURGERY)

## 2022-09-02 PROCEDURE — 999N000157 HC STATISTIC RCP TIME EA 10 MIN

## 2022-09-02 PROCEDURE — 370N000017 HC ANESTHESIA TECHNICAL FEE, PER MIN: Performed by: THORACIC SURGERY (CARDIOTHORACIC VASCULAR SURGERY)

## 2022-09-02 PROCEDURE — 83735 ASSAY OF MAGNESIUM: CPT | Performed by: PHYSICIAN ASSISTANT

## 2022-09-02 PROCEDURE — 250N000009 HC RX 250: Performed by: THORACIC SURGERY (CARDIOTHORACIC VASCULAR SURGERY)

## 2022-09-02 PROCEDURE — 82330 ASSAY OF CALCIUM: CPT

## 2022-09-02 PROCEDURE — 85014 HEMATOCRIT: CPT | Performed by: PHYSICIAN ASSISTANT

## 2022-09-02 PROCEDURE — 999N000253 HC STATISTIC WEANING TRIALS

## 2022-09-02 PROCEDURE — 272N000004 HC RX 272: Performed by: THORACIC SURGERY (CARDIOTHORACIC VASCULAR SURGERY)

## 2022-09-02 PROCEDURE — C1713 ANCHOR/SCREW BN/BN,TIS/BN: HCPCS | Performed by: THORACIC SURGERY (CARDIOTHORACIC VASCULAR SURGERY)

## 2022-09-02 PROCEDURE — 250N000009 HC RX 250: Performed by: PHYSICIAN ASSISTANT

## 2022-09-02 PROCEDURE — 02100Z9 BYPASS CORONARY ARTERY, ONE ARTERY FROM LEFT INTERNAL MAMMARY, OPEN APPROACH: ICD-10-PCS | Performed by: THORACIC SURGERY (CARDIOTHORACIC VASCULAR SURGERY)

## 2022-09-02 PROCEDURE — 5A1221Z PERFORMANCE OF CARDIAC OUTPUT, CONTINUOUS: ICD-10-PCS | Performed by: THORACIC SURGERY (CARDIOTHORACIC VASCULAR SURGERY)

## 2022-09-02 PROCEDURE — 84100 ASSAY OF PHOSPHORUS: CPT | Performed by: PHYSICIAN ASSISTANT

## 2022-09-02 PROCEDURE — 250N000025 HC SEVOFLURANE, PER MIN: Performed by: THORACIC SURGERY (CARDIOTHORACIC VASCULAR SURGERY)

## 2022-09-02 PROCEDURE — 85384 FIBRINOGEN ACTIVITY: CPT | Performed by: THORACIC SURGERY (CARDIOTHORACIC VASCULAR SURGERY)

## 2022-09-02 PROCEDURE — 85027 COMPLETE CBC AUTOMATED: CPT | Performed by: THORACIC SURGERY (CARDIOTHORACIC VASCULAR SURGERY)

## 2022-09-02 PROCEDURE — 250N000013 HC RX MED GY IP 250 OP 250 PS 637: Performed by: THORACIC SURGERY (CARDIOTHORACIC VASCULAR SURGERY)

## 2022-09-02 PROCEDURE — 06BQ4ZZ EXCISION OF LEFT SAPHENOUS VEIN, PERCUTANEOUS ENDOSCOPIC APPROACH: ICD-10-PCS | Performed by: THORACIC SURGERY (CARDIOTHORACIC VASCULAR SURGERY)

## 2022-09-02 DEVICE — SS SUTURE, 4 PER SLEEVE
Type: IMPLANTABLE DEVICE | Site: CHEST | Status: FUNCTIONAL
Brand: MYO/WIRE II

## 2022-09-02 DEVICE — SS SUTURE, 3 PER SLEEVE
Type: IMPLANTABLE DEVICE | Site: CHEST | Status: FUNCTIONAL
Brand: MYO/WIRE II

## 2022-09-02 RX ORDER — KETAMINE HYDROCHLORIDE 10 MG/ML
INJECTION INTRAMUSCULAR; INTRAVENOUS PRN
Status: DISCONTINUED | OUTPATIENT
Start: 2022-09-02 | End: 2022-09-02

## 2022-09-02 RX ORDER — CALCIUM GLUCONATE 20 MG/ML
1 INJECTION, SOLUTION INTRAVENOUS
Status: DISCONTINUED | OUTPATIENT
Start: 2022-09-02 | End: 2022-09-07 | Stop reason: HOSPADM

## 2022-09-02 RX ORDER — LIDOCAINE 40 MG/G
CREAM TOPICAL
Status: DISCONTINUED | OUTPATIENT
Start: 2022-09-02 | End: 2022-09-07 | Stop reason: HOSPADM

## 2022-09-02 RX ORDER — PROCHLORPERAZINE MALEATE 5 MG
5 TABLET ORAL EVERY 6 HOURS PRN
Status: DISCONTINUED | OUTPATIENT
Start: 2022-09-02 | End: 2022-09-07 | Stop reason: HOSPADM

## 2022-09-02 RX ORDER — MAGNESIUM HYDROXIDE 1200 MG/15ML
LIQUID ORAL PRN
Status: DISCONTINUED | OUTPATIENT
Start: 2022-09-02 | End: 2022-09-02 | Stop reason: HOSPADM

## 2022-09-02 RX ORDER — PHENYLEPHRINE HCL IN 0.9% NACL 50MG/250ML
.1-6 PLASTIC BAG, INJECTION (ML) INTRAVENOUS CONTINUOUS
Status: DISCONTINUED | OUTPATIENT
Start: 2022-09-02 | End: 2022-09-02 | Stop reason: HOSPADM

## 2022-09-02 RX ORDER — PANTOPRAZOLE SODIUM 40 MG/1
40 TABLET, DELAYED RELEASE ORAL DAILY
Status: DISCONTINUED | OUTPATIENT
Start: 2022-09-02 | End: 2022-09-07 | Stop reason: HOSPADM

## 2022-09-02 RX ORDER — HEPARIN SODIUM 1000 [USP'U]/ML
INJECTION, SOLUTION INTRAVENOUS; SUBCUTANEOUS PRN
Status: DISCONTINUED | OUTPATIENT
Start: 2022-09-02 | End: 2022-09-02

## 2022-09-02 RX ORDER — POTASSIUM CHLORIDE 29.8 MG/ML
20 INJECTION INTRAVENOUS ONCE
Status: COMPLETED | OUTPATIENT
Start: 2022-09-02 | End: 2022-09-02

## 2022-09-02 RX ORDER — ASPIRIN 81 MG/1
162 TABLET, CHEWABLE ORAL
Status: COMPLETED | OUTPATIENT
Start: 2022-09-02 | End: 2022-09-02

## 2022-09-02 RX ORDER — LACTOBACILLUS RHAMNOSUS GG 10B CELL
1 CAPSULE ORAL DAILY
COMMUNITY

## 2022-09-02 RX ORDER — POLYETHYLENE GLYCOL 3350 17 G/17G
17 POWDER, FOR SOLUTION ORAL DAILY
Status: DISCONTINUED | OUTPATIENT
Start: 2022-09-03 | End: 2022-09-07 | Stop reason: HOSPADM

## 2022-09-02 RX ORDER — MAGNESIUM SULFATE 4 G/50ML
4 INJECTION INTRAVENOUS ONCE
Status: DISCONTINUED | OUTPATIENT
Start: 2022-09-03 | End: 2022-09-02 | Stop reason: HOSPADM

## 2022-09-02 RX ORDER — NALOXONE HYDROCHLORIDE 0.4 MG/ML
0.2 INJECTION, SOLUTION INTRAMUSCULAR; INTRAVENOUS; SUBCUTANEOUS
Status: DISCONTINUED | OUTPATIENT
Start: 2022-09-02 | End: 2022-09-07 | Stop reason: HOSPADM

## 2022-09-02 RX ORDER — ASPIRIN 81 MG/1
162 TABLET, CHEWABLE ORAL DAILY
Status: DISCONTINUED | OUTPATIENT
Start: 2022-09-03 | End: 2022-09-07 | Stop reason: HOSPADM

## 2022-09-02 RX ORDER — VANCOMYCIN HYDROCHLORIDE 1 G/20ML
INJECTION, POWDER, LYOPHILIZED, FOR SOLUTION INTRAVENOUS PRN
Status: DISCONTINUED | OUTPATIENT
Start: 2022-09-02 | End: 2022-09-02 | Stop reason: HOSPADM

## 2022-09-02 RX ORDER — MAGNESIUM SULFATE 4 G/50ML
4 INJECTION INTRAVENOUS ONCE
Status: COMPLETED | OUTPATIENT
Start: 2022-09-02 | End: 2022-09-02

## 2022-09-02 RX ORDER — BISACODYL 10 MG
10 SUPPOSITORY, RECTAL RECTAL DAILY PRN
Status: DISCONTINUED | OUTPATIENT
Start: 2022-09-02 | End: 2022-09-07 | Stop reason: HOSPADM

## 2022-09-02 RX ORDER — MUPIROCIN 20 MG/G
0.5 OINTMENT TOPICAL 2 TIMES DAILY
Status: DISCONTINUED | OUTPATIENT
Start: 2022-09-02 | End: 2022-09-02 | Stop reason: CLARIF

## 2022-09-02 RX ORDER — HYDROMORPHONE HCL IN WATER/PF 6 MG/30 ML
0.2 PATIENT CONTROLLED ANALGESIA SYRINGE INTRAVENOUS
Status: DISCONTINUED | OUTPATIENT
Start: 2022-09-02 | End: 2022-09-04

## 2022-09-02 RX ORDER — SODIUM CHLORIDE, SODIUM GLUCONATE, SODIUM ACETATE, POTASSIUM CHLORIDE AND MAGNESIUM CHLORIDE 526; 502; 368; 37; 30 MG/100ML; MG/100ML; MG/100ML; MG/100ML; MG/100ML
1000 INJECTION, SOLUTION INTRAVENOUS
Status: DISCONTINUED | OUTPATIENT
Start: 2022-09-02 | End: 2022-09-02

## 2022-09-02 RX ORDER — SODIUM CHLORIDE, SODIUM LACTATE, POTASSIUM CHLORIDE, CALCIUM CHLORIDE 600; 310; 30; 20 MG/100ML; MG/100ML; MG/100ML; MG/100ML
INJECTION, SOLUTION INTRAVENOUS CONTINUOUS
Status: DISCONTINUED | OUTPATIENT
Start: 2022-09-02 | End: 2022-09-02 | Stop reason: HOSPADM

## 2022-09-02 RX ORDER — OXYCODONE HYDROCHLORIDE 5 MG/1
5 TABLET ORAL EVERY 4 HOURS PRN
Status: DISCONTINUED | OUTPATIENT
Start: 2022-09-02 | End: 2022-09-07 | Stop reason: HOSPADM

## 2022-09-02 RX ORDER — DEXTROSE MONOHYDRATE 25 G/50ML
25-50 INJECTION, SOLUTION INTRAVENOUS
Status: DISCONTINUED | OUTPATIENT
Start: 2022-09-02 | End: 2022-09-07 | Stop reason: HOSPADM

## 2022-09-02 RX ORDER — ONDANSETRON 4 MG/1
4 TABLET, ORALLY DISINTEGRATING ORAL EVERY 6 HOURS PRN
Status: DISCONTINUED | OUTPATIENT
Start: 2022-09-02 | End: 2022-09-07 | Stop reason: HOSPADM

## 2022-09-02 RX ORDER — CEFAZOLIN SODIUM 2 G/100ML
2 INJECTION, SOLUTION INTRAVENOUS EVERY 8 HOURS
Status: COMPLETED | OUTPATIENT
Start: 2022-09-02 | End: 2022-09-03

## 2022-09-02 RX ORDER — HEPARIN SODIUM 5000 [USP'U]/.5ML
5000 INJECTION, SOLUTION INTRAVENOUS; SUBCUTANEOUS EVERY 8 HOURS
Status: DISCONTINUED | OUTPATIENT
Start: 2022-09-03 | End: 2022-09-07 | Stop reason: HOSPADM

## 2022-09-02 RX ORDER — TAMSULOSIN HYDROCHLORIDE 0.4 MG/1
0.4 CAPSULE ORAL EVERY EVENING
Status: DISCONTINUED | OUTPATIENT
Start: 2022-09-02 | End: 2022-09-07 | Stop reason: HOSPADM

## 2022-09-02 RX ORDER — ASPIRIN 81 MG/1
81 TABLET, CHEWABLE ORAL
Status: COMPLETED | OUTPATIENT
Start: 2022-09-02 | End: 2022-09-02

## 2022-09-02 RX ORDER — CEFAZOLIN SODIUM/WATER 2 G/20 ML
2 SYRINGE (ML) INTRAVENOUS SEE ADMIN INSTRUCTIONS
Status: DISCONTINUED | OUTPATIENT
Start: 2022-09-02 | End: 2022-09-02 | Stop reason: HOSPADM

## 2022-09-02 RX ORDER — NEOSTIGMINE METHYLSULFATE 1 MG/ML
VIAL (ML) INJECTION PRN
Status: DISCONTINUED | OUTPATIENT
Start: 2022-09-02 | End: 2022-09-02

## 2022-09-02 RX ORDER — CEFAZOLIN SODIUM/WATER 2 G/20 ML
2 SYRINGE (ML) INTRAVENOUS
Status: COMPLETED | OUTPATIENT
Start: 2022-09-02 | End: 2022-09-02

## 2022-09-02 RX ORDER — ACETAMINOPHEN 325 MG/1
650 TABLET ORAL EVERY 4 HOURS PRN
Status: DISCONTINUED | OUTPATIENT
Start: 2022-09-05 | End: 2022-09-07 | Stop reason: HOSPADM

## 2022-09-02 RX ORDER — DEXMEDETOMIDINE HYDROCHLORIDE 4 UG/ML
.2-.7 INJECTION, SOLUTION INTRAVENOUS CONTINUOUS
Status: DISCONTINUED | OUTPATIENT
Start: 2022-09-02 | End: 2022-09-04

## 2022-09-02 RX ORDER — METHADONE HYDROCHLORIDE 10 MG/ML
20 INJECTION, SOLUTION INTRAMUSCULAR; INTRAVENOUS; SUBCUTANEOUS ONCE
Status: COMPLETED | OUTPATIENT
Start: 2022-09-02 | End: 2022-09-02

## 2022-09-02 RX ORDER — PHENYLEPHRINE HCL IN 0.9% NACL 50MG/250ML
.1-4 PLASTIC BAG, INJECTION (ML) INTRAVENOUS CONTINUOUS PRN
Status: DISCONTINUED | OUTPATIENT
Start: 2022-09-02 | End: 2022-09-07 | Stop reason: HOSPADM

## 2022-09-02 RX ORDER — ONDANSETRON 2 MG/ML
4 INJECTION INTRAMUSCULAR; INTRAVENOUS EVERY 6 HOURS PRN
Status: DISCONTINUED | OUTPATIENT
Start: 2022-09-02 | End: 2022-09-07 | Stop reason: HOSPADM

## 2022-09-02 RX ORDER — LIDOCAINE 40 MG/G
CREAM TOPICAL
Status: DISCONTINUED | OUTPATIENT
Start: 2022-09-02 | End: 2022-09-02 | Stop reason: HOSPADM

## 2022-09-02 RX ORDER — HYDROMORPHONE HCL IN WATER/PF 6 MG/30 ML
0.4 PATIENT CONTROLLED ANALGESIA SYRINGE INTRAVENOUS
Status: DISCONTINUED | OUTPATIENT
Start: 2022-09-02 | End: 2022-09-04

## 2022-09-02 RX ORDER — FENTANYL CITRATE 50 UG/ML
INJECTION, SOLUTION INTRAMUSCULAR; INTRAVENOUS PRN
Status: DISCONTINUED | OUTPATIENT
Start: 2022-09-02 | End: 2022-09-02

## 2022-09-02 RX ORDER — SODIUM CHLORIDE 9 MG/ML
INJECTION, SOLUTION INTRAVENOUS CONTINUOUS PRN
Status: DISCONTINUED | OUTPATIENT
Start: 2022-09-02 | End: 2022-09-02

## 2022-09-02 RX ORDER — NALOXONE HYDROCHLORIDE 0.4 MG/ML
0.4 INJECTION, SOLUTION INTRAMUSCULAR; INTRAVENOUS; SUBCUTANEOUS
Status: DISCONTINUED | OUTPATIENT
Start: 2022-09-02 | End: 2022-09-07 | Stop reason: HOSPADM

## 2022-09-02 RX ORDER — ATORVASTATIN CALCIUM 40 MG/1
80 TABLET, FILM COATED ORAL DAILY
Status: DISCONTINUED | OUTPATIENT
Start: 2022-09-03 | End: 2022-09-07 | Stop reason: HOSPADM

## 2022-09-02 RX ORDER — EPHEDRINE SULFATE 50 MG/ML
INJECTION, SOLUTION INTRAMUSCULAR; INTRAVENOUS; SUBCUTANEOUS PRN
Status: DISCONTINUED | OUTPATIENT
Start: 2022-09-02 | End: 2022-09-02

## 2022-09-02 RX ORDER — FENTANYL CITRATE-0.9 % NACL/PF 10 MCG/ML
PLASTIC BAG, INJECTION (ML) INTRAVENOUS
Status: DISCONTINUED
Start: 2022-09-02 | End: 2022-09-02 | Stop reason: HOSPADM

## 2022-09-02 RX ORDER — PROTAMINE SULFATE 10 MG/ML
INJECTION, SOLUTION INTRAVENOUS PRN
Status: DISCONTINUED | OUTPATIENT
Start: 2022-09-02 | End: 2022-09-02

## 2022-09-02 RX ORDER — VITAMIN B COMPLEX
100 TABLET ORAL DAILY
Status: DISCONTINUED | OUTPATIENT
Start: 2022-09-03 | End: 2022-09-07 | Stop reason: HOSPADM

## 2022-09-02 RX ORDER — OXYCODONE HYDROCHLORIDE 5 MG/1
10 TABLET ORAL EVERY 4 HOURS PRN
Status: DISCONTINUED | OUTPATIENT
Start: 2022-09-02 | End: 2022-09-07 | Stop reason: HOSPADM

## 2022-09-02 RX ORDER — CHLORHEXIDINE GLUCONATE ORAL RINSE 1.2 MG/ML
10 SOLUTION DENTAL ONCE
Status: DISCONTINUED | OUTPATIENT
Start: 2022-09-02 | End: 2022-09-02 | Stop reason: HOSPADM

## 2022-09-02 RX ORDER — CHLORHEXIDINE GLUCONATE ORAL RINSE 1.2 MG/ML
15 SOLUTION DENTAL EVERY 12 HOURS
Status: DISCONTINUED | OUTPATIENT
Start: 2022-09-02 | End: 2022-09-02 | Stop reason: CLARIF

## 2022-09-02 RX ORDER — METHADONE HYDROCHLORIDE 10 MG/ML
INJECTION, SOLUTION INTRAMUSCULAR; INTRAVENOUS; SUBCUTANEOUS
Status: DISCONTINUED
Start: 2022-09-02 | End: 2022-09-02 | Stop reason: HOSPADM

## 2022-09-02 RX ORDER — FENTANYL CITRATE 50 UG/ML
100 INJECTION, SOLUTION INTRAMUSCULAR; INTRAVENOUS ONCE
Status: DISCONTINUED | OUTPATIENT
Start: 2022-09-02 | End: 2022-09-04

## 2022-09-02 RX ORDER — FENTANYL CITRATE 50 UG/ML
INJECTION, SOLUTION INTRAMUSCULAR; INTRAVENOUS
Status: COMPLETED
Start: 2022-09-02 | End: 2022-09-02

## 2022-09-02 RX ORDER — PROPOFOL 10 MG/ML
INJECTION, EMULSION INTRAVENOUS PRN
Status: DISCONTINUED | OUTPATIENT
Start: 2022-09-02 | End: 2022-09-02

## 2022-09-02 RX ORDER — PROPOFOL 10 MG/ML
5-75 INJECTION, EMULSION INTRAVENOUS CONTINUOUS
Status: DISCONTINUED | OUTPATIENT
Start: 2022-09-02 | End: 2022-09-02

## 2022-09-02 RX ORDER — GLYCOPYRROLATE 0.2 MG/ML
INJECTION, SOLUTION INTRAMUSCULAR; INTRAVENOUS PRN
Status: DISCONTINUED | OUTPATIENT
Start: 2022-09-02 | End: 2022-09-02

## 2022-09-02 RX ORDER — CALCIUM GLUCONATE 20 MG/ML
2 INJECTION, SOLUTION INTRAVENOUS
Status: DISCONTINUED | OUTPATIENT
Start: 2022-09-02 | End: 2022-09-07 | Stop reason: HOSPADM

## 2022-09-02 RX ORDER — ACETAMINOPHEN 325 MG/1
975 TABLET ORAL EVERY 8 HOURS
Status: DISCONTINUED | OUTPATIENT
Start: 2022-09-02 | End: 2022-09-07 | Stop reason: HOSPADM

## 2022-09-02 RX ORDER — DEXMEDETOMIDINE HYDROCHLORIDE 4 UG/ML
0.2-1.2 INJECTION, SOLUTION INTRAVENOUS CONTINUOUS
Status: DISCONTINUED | OUTPATIENT
Start: 2022-09-02 | End: 2022-09-02 | Stop reason: HOSPADM

## 2022-09-02 RX ORDER — PROPOFOL 10 MG/ML
INJECTION, EMULSION INTRAVENOUS
Status: COMPLETED
Start: 2022-09-02 | End: 2022-09-02

## 2022-09-02 RX ORDER — ONDANSETRON 2 MG/ML
INJECTION INTRAMUSCULAR; INTRAVENOUS PRN
Status: DISCONTINUED | OUTPATIENT
Start: 2022-09-02 | End: 2022-09-02

## 2022-09-02 RX ORDER — DEXMEDETOMIDINE HYDROCHLORIDE 4 UG/ML
INJECTION, SOLUTION INTRAVENOUS PRN
Status: DISCONTINUED | OUTPATIENT
Start: 2022-09-02 | End: 2022-09-02

## 2022-09-02 RX ORDER — AMOXICILLIN 250 MG
1 CAPSULE ORAL 2 TIMES DAILY
Status: DISCONTINUED | OUTPATIENT
Start: 2022-09-02 | End: 2022-09-07 | Stop reason: HOSPADM

## 2022-09-02 RX ORDER — NICOTINE POLACRILEX 4 MG
15-30 LOZENGE BUCCAL
Status: DISCONTINUED | OUTPATIENT
Start: 2022-09-02 | End: 2022-09-07 | Stop reason: HOSPADM

## 2022-09-02 RX ORDER — HYDRALAZINE HYDROCHLORIDE 20 MG/ML
10 INJECTION INTRAMUSCULAR; INTRAVENOUS EVERY 30 MIN PRN
Status: DISCONTINUED | OUTPATIENT
Start: 2022-09-02 | End: 2022-09-07 | Stop reason: HOSPADM

## 2022-09-02 RX ADMIN — Medication 12 MCG: at 07:16

## 2022-09-02 RX ADMIN — ALBUMIN (HUMAN) 25 G: 12.5 INJECTION, SOLUTION INTRAVENOUS at 13:13

## 2022-09-02 RX ADMIN — SODIUM CHLORIDE: 9 INJECTION, SOLUTION INTRAVENOUS at 07:13

## 2022-09-02 RX ADMIN — AMINOCAPROIC ACID 7.5 G: 250 INJECTION, SOLUTION INTRAVENOUS at 08:00

## 2022-09-02 RX ADMIN — AMINOCAPROIC ACID 1.25 G/HR: 250 INJECTION, SOLUTION INTRAVENOUS at 08:58

## 2022-09-02 RX ADMIN — ROCURONIUM BROMIDE 80 MG: 50 INJECTION, SOLUTION INTRAVENOUS at 07:30

## 2022-09-02 RX ADMIN — FENTANYL CITRATE 50 MCG: 50 INJECTION, SOLUTION INTRAMUSCULAR; INTRAVENOUS at 07:30

## 2022-09-02 RX ADMIN — FENTANYL CITRATE 100 MCG: 50 INJECTION, SOLUTION INTRAMUSCULAR; INTRAVENOUS at 13:12

## 2022-09-02 RX ADMIN — SODIUM BICARBONATE 50 MEQ: 84 INJECTION, SOLUTION INTRAVENOUS at 11:47

## 2022-09-02 RX ADMIN — SODIUM PHOSPHATE, MONOBASIC, MONOHYDRATE 9 MMOL: 276; 142 INJECTION, SOLUTION INTRAVENOUS at 14:41

## 2022-09-02 RX ADMIN — OXYCODONE HYDROCHLORIDE 10 MG: 5 TABLET ORAL at 22:32

## 2022-09-02 RX ADMIN — DEXMEDETOMIDINE HYDROCHLORIDE 0.5 MCG/KG/HR: 400 INJECTION INTRAVENOUS at 13:48

## 2022-09-02 RX ADMIN — Medication 10 MG: at 11:05

## 2022-09-02 RX ADMIN — LIDOCAINE HYDROCHLORIDE 30 MG: 10 INJECTION, SOLUTION EPIDURAL; INFILTRATION; INTRACAUDAL; PERINEURAL at 07:30

## 2022-09-02 RX ADMIN — SODIUM CHLORIDE: 9 INJECTION, SOLUTION INTRAVENOUS at 11:00

## 2022-09-02 RX ADMIN — Medication 5 MG: at 11:09

## 2022-09-02 RX ADMIN — GLYCOPYRROLATE 1 MG: 0.2 INJECTION, SOLUTION INTRAMUSCULAR; INTRAVENOUS at 11:55

## 2022-09-02 RX ADMIN — SODIUM CHLORIDE, POTASSIUM CHLORIDE, SODIUM LACTATE AND CALCIUM CHLORIDE: 600; 310; 30; 20 INJECTION, SOLUTION INTRAVENOUS at 11:00

## 2022-09-02 RX ADMIN — HYDROMORPHONE HYDROCHLORIDE 0.4 MG: 0.2 INJECTION, SOLUTION INTRAMUSCULAR; INTRAVENOUS; SUBCUTANEOUS at 12:45

## 2022-09-02 RX ADMIN — FENTANYL CITRATE 50 MCG: 50 INJECTION, SOLUTION INTRAMUSCULAR; INTRAVENOUS at 07:17

## 2022-09-02 RX ADMIN — GLYCOPYRROLATE 0.4 MG: 0.2 INJECTION, SOLUTION INTRAMUSCULAR; INTRAVENOUS at 07:30

## 2022-09-02 RX ADMIN — PROPOFOL 90 MG: 10 INJECTION, EMULSION INTRAVENOUS at 07:30

## 2022-09-02 RX ADMIN — PHENYLEPHRINE HYDROCHLORIDE 100 MCG: 10 INJECTION INTRAVENOUS at 07:50

## 2022-09-02 RX ADMIN — Medication 20 MG: at 07:30

## 2022-09-02 RX ADMIN — NEOSTIGMINE METHYLSULFATE 5 MG: 1 INJECTION, SOLUTION INTRAVENOUS at 11:55

## 2022-09-02 RX ADMIN — PHENYLEPHRINE HYDROCHLORIDE 200 MCG: 10 INJECTION INTRAVENOUS at 09:39

## 2022-09-02 RX ADMIN — DEXMEDETOMIDINE HYDROCHLORIDE 0.5 MCG/KG/HR: 400 INJECTION INTRAVENOUS at 08:00

## 2022-09-02 RX ADMIN — EPINEPHRINE 0.02 MCG/KG/MIN: 1 INJECTION INTRAMUSCULAR; INTRAVENOUS; SUBCUTANEOUS at 10:37

## 2022-09-02 RX ADMIN — CEFAZOLIN SODIUM 2 G: 2 INJECTION, SOLUTION INTRAVENOUS at 23:56

## 2022-09-02 RX ADMIN — PHENYLEPHRINE HYDROCHLORIDE 100 MCG: 10 INJECTION INTRAVENOUS at 07:29

## 2022-09-02 RX ADMIN — HYDROMORPHONE HYDROCHLORIDE 0.2 MG: 0.2 INJECTION, SOLUTION INTRAMUSCULAR; INTRAVENOUS; SUBCUTANEOUS at 22:12

## 2022-09-02 RX ADMIN — ROCURONIUM BROMIDE 50 MG: 50 INJECTION, SOLUTION INTRAVENOUS at 08:25

## 2022-09-02 RX ADMIN — INSULIN HUMAN 3 UNITS/HR: 1 INJECTION, SOLUTION INTRAVENOUS at 10:00

## 2022-09-02 RX ADMIN — SODIUM CHLORIDE, POTASSIUM CHLORIDE, SODIUM LACTATE AND CALCIUM CHLORIDE: 600; 310; 30; 20 INJECTION, SOLUTION INTRAVENOUS at 07:13

## 2022-09-02 RX ADMIN — Medication 0.2 MCG/KG/MIN: at 11:50

## 2022-09-02 RX ADMIN — MAGNESIUM SULFATE HEPTAHYDRATE 4 G: 80 INJECTION, SOLUTION INTRAVENOUS at 06:47

## 2022-09-02 RX ADMIN — ROCURONIUM BROMIDE 20 MG: 50 INJECTION, SOLUTION INTRAVENOUS at 09:36

## 2022-09-02 RX ADMIN — ACETAMINOPHEN 975 MG: 325 TABLET, FILM COATED ORAL at 23:48

## 2022-09-02 RX ADMIN — Medication 5 MG: at 07:30

## 2022-09-02 RX ADMIN — Medication 2 G: at 07:58

## 2022-09-02 RX ADMIN — KETAMINE HYDROCHLORIDE 50 MG: 10 INJECTION, SOLUTION INTRAMUSCULAR; INTRAVENOUS at 07:30

## 2022-09-02 RX ADMIN — HYDROMORPHONE HYDROCHLORIDE 0.2 MG: 0.2 INJECTION, SOLUTION INTRAMUSCULAR; INTRAVENOUS; SUBCUTANEOUS at 16:34

## 2022-09-02 RX ADMIN — POTASSIUM CHLORIDE 20 MEQ: 400 INJECTION, SOLUTION INTRAVENOUS at 13:36

## 2022-09-02 RX ADMIN — Medication 8 MCG: at 07:24

## 2022-09-02 RX ADMIN — PROPOFOL 20 MCG/KG/MIN: 10 INJECTION, EMULSION INTRAVENOUS at 14:21

## 2022-09-02 RX ADMIN — METOPROLOL TARTRATE 12.5 MG: 25 TABLET, FILM COATED ORAL at 06:33

## 2022-09-02 RX ADMIN — ONDANSETRON 4 MG: 2 INJECTION INTRAMUSCULAR; INTRAVENOUS at 11:56

## 2022-09-02 RX ADMIN — DESMOPRESSIN ACETATE 28.04 MCG: 4 SOLUTION INTRAVENOUS at 11:15

## 2022-09-02 RX ADMIN — MIDAZOLAM HYDROCHLORIDE 1 MG: 1 INJECTION, SOLUTION INTRAMUSCULAR; INTRAVENOUS at 12:32

## 2022-09-02 RX ADMIN — PHENYLEPHRINE HYDROCHLORIDE 100 MCG: 10 INJECTION INTRAVENOUS at 11:30

## 2022-09-02 RX ADMIN — ASPIRIN 81 MG: 81 TABLET, CHEWABLE ORAL at 06:32

## 2022-09-02 RX ADMIN — Medication 5 MG: at 11:27

## 2022-09-02 RX ADMIN — ONDANSETRON 4 MG: 2 INJECTION INTRAMUSCULAR; INTRAVENOUS at 18:45

## 2022-09-02 RX ADMIN — CEFAZOLIN SODIUM 2 G: 2 INJECTION, SOLUTION INTRAVENOUS at 16:36

## 2022-09-02 RX ADMIN — PROTAMINE SULFATE 40 MG: 10 INJECTION, SOLUTION INTRAVENOUS at 10:52

## 2022-09-02 RX ADMIN — PHENYLEPHRINE HYDROCHLORIDE 100 MCG: 10 INJECTION INTRAVENOUS at 11:49

## 2022-09-02 RX ADMIN — PHENYLEPHRINE HYDROCHLORIDE 100 MCG: 10 INJECTION INTRAVENOUS at 07:30

## 2022-09-02 RX ADMIN — HEPARIN SODIUM 40000 UNITS: 1000 INJECTION INTRAVENOUS; SUBCUTANEOUS at 09:17

## 2022-09-02 ASSESSMENT — ACTIVITIES OF DAILY LIVING (ADL)
ADLS_ACUITY_SCORE: 26
ADLS_ACUITY_SCORE: 20
ADLS_ACUITY_SCORE: 26
DEPENDENT_IADLS:: INDEPENDENT
ADLS_ACUITY_SCORE: 26
ADLS_ACUITY_SCORE: 20
ADLS_ACUITY_SCORE: 20
ADLS_ACUITY_SCORE: 26

## 2022-09-02 ASSESSMENT — ENCOUNTER SYMPTOMS: DYSRHYTHMIAS: 0

## 2022-09-02 NOTE — PHARMACY-ADMISSION MEDICATION HISTORY
Pharmacy Note - Admission Medication History   ______________________________________________________________________    Prior To Admission (PTA) med list completed and updated in EMR.       PTA Med List   Medication Sig Last Dose     aspirin 81 MG EC tablet [ASPIRIN 81 MG EC TABLET] Take 81 mg by mouth daily. 9/2/2022     atorvastatin (LIPITOR) 80 MG tablet Take 1 tablet (80 mg) by mouth daily 9/2/2022     finasteride (PROSCAR) 5 MG tablet Take 5 mg by mouth daily 9/2/2022     FLUoxetine (PROZAC) 20 MG capsule TAKE 3 CAPSULES BY MOUTH IN THE MORNING 9/2/2022     glipiZIDE (GLUCOTROL XL) 5 MG 24 hr tablet Take 5 mg by mouth 2 times daily (before meals) 9/1/2022     Insulin Glargine-Lixisenatide (SOLIQUA SC) Inject 25 Units Subcutaneous daily 0-25 Units depending on blood sugar reading 9/2/2022 at 12 units     isosorbide mononitrate (IMDUR) 30 MG 24 hr tablet Take 1 tablet (30 mg) by mouth daily 9/2/2022     lactobacillus rhamnosus, GG, (CULTURELL) capsule Take 1 capsule by mouth daily 9/1/2022     metFORMIN (GLUCOPHAGE) 1000 MG tablet [METFORMIN (GLUCOPHAGE) 1000 MG TABLET] Take 1,000 mg by mouth 2 (two) times a day with meals. 9/1/2022     nitroGLYcerin (NITROSTAT) 0.4 MG sublingual tablet For chest pain place 1 tablet under the tongue every 5 minutes for 3 doses. If symptoms persist 5 minutes after 1st dose call 911. Unknown     tamsulosin (FLOMAX) 0.4 mg Cp24 Take 0.4 mg by mouth every evening 8/31/2022     vitamin D3 (CHOLECALCIFEROL) 50 mcg (2000 units) tablet Take 2 tablets by mouth daily 9/2/2022       Information source(s): Patient    Method of interview communication: in-person    Patient was asked about OTC/herbal products specifically.  PTA med list reflects this.    Based on the pharmacist's assessment, the PTA med list information appears reliable    Allergies were reviewed, assessed, and updated with the patient.      Patient does not use any multi-dose medications prior to admission.     Thank you for  the opportunity to participate in the care of this patient.      Chloe Rubio RPH     9/2/2022     6:27 AM

## 2022-09-02 NOTE — ANESTHESIA POSTPROCEDURE EVALUATION
Patient: Bala Hopper    Procedure: Procedure(s):  CORONARY ARTERY BYPASS GRAFT (CABG)X2 LEFT LEG ENDOSCOPIC SAPHENOUS VESSEL PROCUREMENT, LEFT INTERNAL MAMMARY ARTERY HARVEST, ANESTHESIA TRANSESOPHAGEAL ECHOCARDIOGRAM       Anesthesia Type:  General    Note:  Disposition: Admission; ICU            ICU Sign Out: Anesthesiologist/ICU physician sign out WAS performed   Postop Pain Control: Uneventful            Sign Out: Well controlled pain   PONV: No   Neuro/Psych: Uneventful            Sign Out: Acceptable/Baseline neuro status   Airway/Respiratory: Uneventful            Sign Out: AIRWAY IN SITU/Resp. Support               Airway in situ/Resp. Support: ETT                 Reason: Planned Pre-op   CV/Hemodynamics: Uneventful            Sign Out: Acceptable CV status; No obvious hypovolemia; No obvious fluid overload   Other NRE: NONE   DID A NON-ROUTINE EVENT OCCUR? No    Event details/Postop Comments:  Doing very well.  CT output minimal.  Departing Hgb 11.  Only drip is PE at 0.1.  Plan for early wean and extubation.  No appreciable clinical bleeding during the post-CPB period.           Last vitals:  Vitals:    09/02/22 0531 09/02/22 1210 09/02/22 1215   BP: 110/58     Pulse:  90 90   Resp:  16    Temp:  (!) 35.7  C (96.3  F)    SpO2:  99% 99%       Electronically Signed By: Vamshi Thapa MD  September 2, 2022  12:24 PM

## 2022-09-02 NOTE — PROGRESS NOTES
ICU Follow Up Note:    Patient seen during SBT - 5/5 for 30 min and is awake, alert, taking good Vt with RSBI 37.    Order to extubate placed.      Aura Dyer MD  Pulmonary and Critical Care Medicine  [facility]  Office: [phone]

## 2022-09-02 NOTE — ANESTHESIA PROCEDURE NOTES
Airway       Patient location during procedure: OR  Staff -        Anesthesiologist:  Vamshi Thapa MD       CRNA: Brian Baxter APRN CRNA       Performed By: CRNAIndications and Patient Condition       Indications for airway management: manpreet-procedural         Mask difficulty assessment: 2 - vent by mask + OA or adjuvant +/- NMBA    Final Airway Details       Final airway type: endotracheal airway       Successful airway: ETT - single  Endotracheal Airway Details        ETT size (mm): 8.0       Cuffed: yes       Successful intubation technique: direct laryngoscopy       DL Blade Type: MAC 4       Grade View of Cords: 2       Adjucts: stylet and tooth guard       Position: Right       Measured from: gums/teeth       Secured at (cm): 22    Post intubation assessment        Placement verified by: capnometry, equal breath sounds and chest rise        Number of attempts at approach: 1       Number of other approaches attempted: 0       Secured with: silk tape       Ease of procedure: easy       Dentition: Intact and Unchanged

## 2022-09-02 NOTE — PROGRESS NOTES
Start CPAP/PS trial 5/5 .40. Pt slightly more awake, RN at bedside, RT to monitor, wean as tolerated,     /72   Pulse 89   Temp 99  F (37.2  C)   Resp 19   Wt 91.9 kg (202 lb 8 oz)   SpO2 99%   BMI 26.00 kg/m      Vent Mode: CPAP/PS  (Continuous positive airway pressure with Pressure Support)  FiO2 (%): 40 %  Resp Rate (Set): 16 breaths/min  Tidal Volume (Set, mL): 650 mL  PEEP (cm H2O): 5 cmH2O  Pressure Support (cm H2O): 5 cmH2O  Resp: 19

## 2022-09-02 NOTE — ANESTHESIA PROCEDURE NOTES
Central Line/PA Catheter Placement    Pre-Procedure   Staff -        Anesthesiologist:  Vamshi Thapa MD       Performed By: anesthesiologist       Location: OR       Pre-Anesthestic Checklist: patient identified, IV checked, site marked, risks and benefits discussed, informed consent, monitors and equipment checked and pre-op evaluation  Timeout:       Correct Patient: Yes        Correct Procedure: Yes        Correct Site: Yes        Correct Position: Yes        Correct Laterality: Yes   Line Placement:   This line was placed Post Induction starting at 9/2/2022 7:40 AM and ending at 9/2/2022 7:55 AM    Procedure   Procedure: central line       Laterality: right       Insertion Site: internal jugular.       Patient Position: Trendelenburg  Sterile Prep        All elements of maximal sterile barrier technique followed       Patient Prep/Sterile Barriers: draped, hand hygiene, gloves , hat , mask , draped, gown, sterile gel and probe cover       Skin prep: Chloraprep  Insertion/Injection        Technique: ultrasound guided and Seldinger Technique        1. Ultrasound was used to evaluate the access site.       2. Vein evaluated via ultrasound for patency/adequacy.       3. Using real-time ultrasound the needle/catheter was observed entering the artery/vein.       4. Permanent image was captured and entered into the patient's record.       5. The visualized structures were anatomically normal.       6. There were no apparent abnormal pathologic findings.       Introducer Type: 9 Fr, 2-lumen MAC        Type: PA/CVC with Introducer       Catheter Size: 9 Fr       Catheter Length: 12       Number of Lumens: double lumen       PA Catheter Type: CCO         Appropriate RV, RA and PA waveforms noted:  Yes            Distance to Wedge Position cm: 58            Withdrawn and Locked at cm: 55            Balloon down at end of the procedure:   Narrative         Secured by: suture       Tegaderm dressing used.        Complications: None apparent,        blood aspirated from all lumens,        All lumens flushed: Yes       Verification method: Ultrasound
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
## 2022-09-02 NOTE — OP NOTE
Procedure Date: 09/02/2022    OPERATING AREA:  Room 8.    PROCEDURES:    1.  Median sternotomy.  2.  Takedown of the left internal mammary artery.   3.  Endoscopic greater saphenous vein procurement from the left leg.  4.  Epiaortic ultrasound of the ascending aorta.  5.  Placement on central cardiopulmonary bypass.  6.  Double vessel coronary artery bypass grafting procedure; left internal mammary artery to the left anterior descending coronary artery and a separate reversed saphenous vein graft to the left anterior descending diagonal branch.  7.  Placement of temporary atrial and ventricular pacing wires.    ATTENDING SURGEON:  Carline Bradshaw MD    RESIDENT SURGEON:  Osman Allen MD     FIRST ASSISTANT:  [facility].    PHYSICIAN ASSISTANT:  REBEL Longoria.    ANESTHESIA:  General endotracheal.    SKIN PREP:  Betadine and DuraPrep.    INCISIONS:  Median sternotomy and a skip incision along the course of the greater saphenous vein, left upper leg.    DRAINS:  Two 32-Moldovan Poth mediastinal, one 24-Moldovan Sarwat drain, left chest.    CULTURES:  None.    SPECIMEN:  None.    CLOSURE:  Routine.    PREOPERATIVE DIAGNOSES:    1.  Progressive dyspnea on exertion.  2.  Dizziness and lightheadedness.  3.  Dyslipidemia.  4.  Obstructive sleep apnea.  5.  Single vessel coronary artery disease.  6.  Preserved left ventricular function.    POSTOPERATIVE DIAGNOSES:    1.  Progressive dyspnea on exertion.  2.  Dizziness and lightheadedness.  3.  Dyslipidemia.  4.  Obstructive sleep apnea.  5.  Single vessel coronary artery disease.  6.  Preserved left ventricular function.    BRIEF HISTORY:  Corey is a 71-year-old gentleman who presented with progressive dyspnea on exertion.  His MRI was positive for ischemia.  He also reports some dizziness and lightheadedness.  He has dyslipidemia and diabetes as well as obstructive sleep apnea.  He was found to have severe single vessel coronary artery disease including a  totally occluded left anterior descending coronary artery and the above procedure was planned.    FINDINGS AT OPERATION:  By epiaortic ultrasound his ascending aorta was free of plaque.  His pulmonary artery pressures were normal.  He did have an element of left ventricular hypertrophy, but overall contractility was preserved.  The left internal mammary artery was a 2 mm conduit with excellent flow.  The reversed saphenous vein graft measured 4 to 5 mm in diameter and was of good quality.  The left anterior descending diagonal branch was a 2.5 mm target vessel, an excellent vessel for bypass grafting.  The left anterior descending coronary artery in its apical third was a 2 mm target vessel, an excellent vessel for bypass grafting.    DESCRIPTION OF PROCEDURE:  The patient arrived in the operating room and an arterial line was placed.  Satisfactory general endotracheal anesthesia was induced.  An OG tube was inserted followed by a transesophageal echo probe, a Orosi-Lourdes catheter and a Tom catheter.  The patient's neck, chest, abdomen and both groins, and lower extremities were prepped and draped in a standard sterile fashion.  A complete median sternotomy was made.  With the aid of the Rultract retractor, the left internal mammary artery was taken down from the subclavian vein to the xiphoid process.  At the same time, a skip incision was made along the course of the greater saphenous vein in the left upper leg and approximately 5 cm of the vein was exposed circumferentially.  The endoscope was then passed distally and the greater saphenous vein was mobilized circumferentially.  It was dissected down to the level of the ankle.  It was clipped proximally and distally and extracted.  It was then cannulated and distended, its branches controlled with Ligaclips.  The leg wound was rendered hemostatic and then closed in layers using running 2-0 and 3-0 Vicryl as well as Dermabond on the skin.    Heparin was  administered.  The left internal mammary artery was prepared in the usual fashion.  A Palestinian retractor was inserted.  The pericardium was opened and tented up on pericardial sutures.  The aorta was  from the pulmonary artery.  Epiaortic ultrasound of the ascending aorta was carried out.  Pursestring sutures were placed in the ascending aorta and right atrium for cannulation.  When the ACT was appropriate, cannulation was performed and central cardiopulmonary bypass established.  The patient was allowed to drift.  A retrograde cardioplegia catheter was inserted via the right atrium into the coronary sinus.  The aorta was crossclamped and 700 mL of cold blood cardioplegia was administered antegrade and an additional 500 mL of cold blood cardioplegia was administered retrograde.  A good arrest was achieved.  Cold topical saline and slush was applied to the heart intermittently during the period of aortic crossclamp.  Attention was first turned to the left anterior descending diagonal branch.  This vessel was dissected out for a distance of 1 cm and then opened for a distance of 8 mm.  It was bypassed in an end-to-side fashion using reversed saphenous vein graft and running 7-0 Prolene.  Upon completion of this first distal anastomosis, the vein graft was flushed with cold blood cardioplegia and sized to the ascending aorta, and the patient received 300 mL of cold blood cardioplegia in a retrograde fashion.  Next, attention was turned to the left anterior descending coronary artery in the interventricular groove.  This vessel was dissected out in its apical third for 1 cm and then opened for a distance of 8 mm.  A pleural rent was created for passage of the left internal mammary artery and then the second and last distal anastomosis was performed between the left internal mammary artery and the left anterior descending coronary artery in an end-to-side fashion using running 7-0 Prolene.  As this second distal  anastomosis was being performed, gentle warming was begun.  The gray occluder was briefly released from the left internal mammary artery and good flow was seen down the distribution of the left anterior descending coronary artery.  The gray occluder was once more applied to the left internal mammary artery and the patient received a final dose of cold blood cardioplegia in a retrograde fashion.  It had been decided to perform the single proximal aortosaphenous anastomosis with the aortic crossclamp in place; therefore, a 4 mm punch aortotomy was created.  The single proximal aorta aortosaphenous anastomosis was constructed in an end-to-side fashion using running 6-0 Prolene.  The gray occluder was released from the left internal mammary artery and a hot shot was delivered in an antegrade fashion.  The aortic crossclamp was released.  The aortic cross-clamp time was 51 minutes.  The patient required defibrillation twice.  The proximal and distal anastomoses were examined and found to be hemostatic.  Ventricular and atrial pacing wires were applied and the patient was AV sequentially paced at a rate of 90.  The left pleural space was drained of any accumulated blood and gentle ventilation was once more begun.  The retrograde cardioplegia catheter was removed and that site repaired with two 4-0 Prolene sutures.  The root vent was removed and that site repaired with pledgeted 4-0 Prolene.  The patient was placed on low-dose epinephrine.  When he was warm, the heart was allowed to fill and eject and the patient  from cardiopulmonary bypass without difficulty.  Total time on cardiopulmonary bypass was an hour and 3 minutes.  The patient was decannulated and the cannulation sites oversewn with 4-0 Prolene.  Protamine was administered to reverse the heparin.  Hemostasis was satisfactory.  The mediastinum was drained with two 32-Namibian Elberon chest tubes and the left pleural space was drained with a 24-Namibian Sarwat  drain.  The sternum was approximated with a combination of #6 stainless steel sternal wires as well as double wires.  The sternotomy wound was irrigated with warm antibiotic-containing solution.  It was closed in 4 layers using 2 layers of running 0 Stratafix, an additional layer of 2-0 Stratafix and a subcuticular stitch of 4-0 Monocryl.  Dermabond was applied to the skin.  The sponge, needle and instrument counts were reported as correct.  The estimated blood loss was 500 mL. Mr. Hopper was brought to the intensive care unit in critical but stable condition.    Carline Bradshaw MD        D: 2022   T: 2022   MT: sd/mauri/syed    Name:     NGHIA HOPPER  MRN:      [id number]        Account:        [id number]   :      1950           Procedure Date: 2022     Document: [id number]

## 2022-09-02 NOTE — PROGRESS NOTES
Pt extubated at 1805 after CPAP/PS 5/5 .40 trial x 28 minutes. Pt awake and responsive, tolerated well, no post extubation stridor, BS clear. Placed on 3 lpm/NC, SpO2 after 98 %. RT to monitor,    /72   Pulse 89   Temp 99  F (37.2  C)   Resp 19   Wt 91.9 kg (202 lb 8 oz)   SpO2 99%   BMI 26.00 kg/m

## 2022-09-02 NOTE — ANESTHESIA CARE TRANSFER NOTE
Patient: Bala Hopper    Procedure: Procedure(s):  CORONARY ARTERY BYPASS GRAFT (CABG)X2 LEFT LEG ENDOSCOPIC SAPHENOUS VESSEL PROCUREMENT, LEFT INTERNAL MAMMARY ARTERY HARVEST, ANESTHESIA TRANSESOPHAGEAL ECHOCARDIOGRAM       Diagnosis: CAD (coronary artery disease) [I25.10]  Diagnosis Additional Information: No value filed.    Anesthesia Type:   General     Note:    Oropharynx: endotracheal tube in place  Level of Consciousness: iatrogenic sedation    Level of Supplemental Oxygen (L/min / FiO2): 60  Independent Airway: airway patency not satisfactory and stable  Dentition: dentition unchanged  Vital Signs Stable: post-procedure vital signs reviewed and stable  Report to RN Given: handoff report given  Patient transferred to: ICU    ICU Handoff: Call for PAUSE to initiate/utilize ICU HANDOFF, Identified Patient, Identified Responsible Provider, Reviewed the Pertinent Medical History, Discussed Surgical Course, Reviewed Intra-OP Anesthesia Management and Issues during Anesthesia, Set Expectations for Post Procedure Period and Allowed Opportunity for Questions and Acknowledgement of Understanding      Vitals:  Vitals Value Taken Time   /62 09/02/22 1200   Temp 35.7  C (96.3  F) 09/02/22 1210   Pulse 90 09/02/22 1222   Resp 16 09/02/22 1210   SpO2 99 % 09/02/22 1222   Vitals shown include unvalidated device data.    Electronically Signed By: ANURAG Rey CRNA  September 2, 2022  12:24 PM

## 2022-09-02 NOTE — PRE-PROCEDURE
Patient was seen and examined by me.  There has been no interval change in history or physical examination.  Patient is ready to undergo CABG.  Informed consent has been obtained.

## 2022-09-02 NOTE — PROGRESS NOTES
1152 received pt from CV surgery and placed on mechanical ventilation RR 16, Vt 650, PEEP 5, FiO2 .60 per CV surgery team, BS slight scattered Rhonchi, # 8.0 ETT taped to right side of mouth.     RT PROGRESS NOTE    VENT DAY# 1    CURRENT SETTINGS:   Vent Mode: Other (see comments) (VC AC)  FiO2 (%): 60 %  Resp Rate (Set): 16 breaths/min  Tidal Volume (Set, mL): 650 mL  PEEP (cm H2O): 5 cmH2O  Resp: 16    /58 (BP Location: Right arm)   Pulse 89   Temp 97  F (36.1  C)   Resp 16   Wt 91.9 kg (202 lb 8 oz)   SpO2 98%   BMI 26.00 kg/m        PATIENT PARAMETERS:  PIP 28  Pplat:  17  Pmean:  10  Compliance: 56  SBT: no  02 Sats:  99 %    ETT SIZE 8 Secured at 22 cm at teeth/gums    Respiratory Medications: none      Latest Reference Range & Units 09/02/22 12:19   Ph Venous 7.35 - 7.45  7.37   PCO2 Venous 35 - 50 mm Hg 40   PO2 Venous 25 - 47 mm Hg 44   O2 Sat, Venous 70.0 - 75.0 % 80.4 (H)   Bicarbonate Venous 24 - 30 mmol/L 23 (L)   Base Excess Venous   mmol/L -1.6   Oxyhemoglobin Venous 70.0 - 75.0 % 79.4 (H)   (H): Data is abnormally high  (L): Data is abnormally low    NOTE / SHIFT SUMMARY:   Continue on mechanical ventilator, wean as tolerated.     Yuriy Rich, RT

## 2022-09-02 NOTE — PLAN OF CARE
[facility] - ICU    RN Progress Note:            Pertinent Assessments:      Please refer to flowsheet rows for full assessment     Pt VSS post op cabX2, issue w/ agitation/restlessness requiring increased sedation         Mobility Level:     bedrest         Key Events - This Shift:     Pt woke from surgery agitated, dislodged pacer wires and attempted to bite thru ETT, pulling on restraints and kicking. CVS updated and Propofol initiated.               Plan:     Will attempt another wean this evening.          Point of Contact Update YES-OR-NO: Yes    Name:Justice  Phone Number:in chart  Summary of Conversation: at bedside so updated on POC       Goal Outcome Evaluation:

## 2022-09-02 NOTE — CONSULTS
CARDIAC SURGERY NUTRITION CONSULT    Received standing order to educate patient.    Will follow and complete diet ed once patient is extubated and/or is transferred to medical unit.  Pt intubated remains intubated today.    Patient will receive additional nutrition education during the Outpatient Cardiac Rehab Program (nutrition classes/dietitian counseling).

## 2022-09-02 NOTE — PROGRESS NOTES
Changed to CPAP/PS 5/5 .60. decreased FiO2 to .50. Pt arousable but went apneic x 4, stopped CPAP trial.     1305 placed back on VC AC previous settings, will monitor and wean as tolerated when pt more awake,     /58 (BP Location: Right arm)   Pulse 89   Temp 97  F (36.1  C)   Resp 16   Wt 91.9 kg (202 lb 8 oz)   SpO2 98%   BMI 26.00 kg/m      Vent Mode: Other (see comments) (VC AC)  FiO2 (%): 60 %  Resp Rate (Set): 16 breaths/min  Tidal Volume (Set, mL): 650 mL  PEEP (cm H2O): 5 cmH2O  Resp: 16        .

## 2022-09-02 NOTE — PROGRESS NOTES
CPAP/PS trial 5/5 .40 x 9 minutes, pt goes apneic when he falls asleep. While awake Vt up to 674.     1614 Placed back on VC AC previous settings, RN at bedside, RT to monitor, wean as tolerated.     /72   Pulse 89   Temp 99  F (37.2  C)   Resp 19   Wt 91.9 kg (202 lb 8 oz)   SpO2 99%   BMI 26.00 kg/m

## 2022-09-02 NOTE — BRIEF OP NOTE
[facility]    Brief Operative Note    Pre-operative diagnosis: CAD (coronary artery disease) [I25.10]  Post-operative diagnosis Same as pre-operative diagnosis    Procedure: Procedure(s):  CORONARY ARTERY BYPASS GRAFT (CABG)X2 LEFT LEG ENDOSCOPIC SAPHENOUS VESSEL PROCUREMENT, LEFT INTERNAL MAMMARY ARTERY HARVEST, ANESTHESIA TRANSESOPHAGEAL ECHOCARDIOGRAM  Surgeon: Surgeon(s) and Role:     * Carline Bradshaw MD - Primary     * Osman Allen MD - Assisting  Anesthesia: General   Estimated Blood Loss: 500 ml    Drains: Two mediastinal, one left pleural  Specimens: * No specimens in log *  Findings:   LIMA to LAD, RSVG to D1.  Complications: None.  Implants:   Implant Name Type Inv. Item Serial No.  Lot No. LRB No. Used Action   BRAGG STERNAL WIRE SINGLE #6 046-032 - [id number] Wire BRAGG STERNAL WIRE SINGLE #6 046-032  [employer] [id number] N/A 1 Implanted   BRAGG MYOSTERNAL WIRE 046-267 - [id number] Wire BRAGG MYOSTERNAL WIRE [id number]  [employer] [id number] N/A 1 Implanted     Signed:    Osman Allen MD 9/2/2022 at 11:26 AM  Cardiothoracic Surgery Fellow  Pager: [phone]

## 2022-09-02 NOTE — ANESTHESIA PROCEDURE NOTES
Arterial Line Procedure Note    Pre-Procedure   Staff -        Anesthesiologist:  Vamshi Thapa MD       Performed By: anesthesiologist       Location: OR       Pre-Anesthestic Checklist: patient identified, IV checked, risks and benefits discussed, informed consent, monitors and equipment checked and pre-op evaluation  Timeout:       Correct Patient: Yes        Correct Procedure: Yes        Correct Site: Yes        Correct Position: Yes   Line Placement:   This line was placed Pre Induction starting at 9/2/2022 7:20 AM and ending at 9/2/2022 7:25 AM  Procedure   Procedure: arterial line       Laterality: right       Insertion Site: brachial.  Sterile Prep        Standard elements of sterile barrier followed       Skin prep: Chloraprep  Insertion/Injection        Technique: ultrasound guided and Seldinger Technique        1. Ultrasound was used to evaluate the access site.       2. Artery evaluated via ultrasound for patency/adequacy.       3. Using real-time ultrasound the needle/catheter was observed entering the artery/vein.       4. Permanent image was captured and entered into the patient's record.       5. The visualized structures were anatomically normal.       6. There were no apparent abnormal pathologic findings.       Catheter Type/Size: 20 G, 12 cm  Narrative         Secured by: suture       Tegaderm and Biopatch dressing used.       Complications: None apparent,        Arterial waveform: Yes        IBP within 10% of NIBP: Yes

## 2022-09-02 NOTE — ANESTHESIA PROCEDURE NOTES
Perioperative KAYE Procedure Note    Staff -        Anesthesiologist:  Vamshi Thapa MD       Performed By: anesthesiologist  Preanesthesia Checklist:  Patient identified, IV assessed, risks and benefits discussed, monitors and equipment assessed, procedure being performed at surgeon's request and anesthesia consent obtained.    KAYE Probe Insertion    Probe Status PRE Insertion: NO obvious damage  Probe type:  Adult 2D  Bite block used:   Soft  Insertion Technique: Easy, no oropharyngeal manipulation  Insertion complications: None obvious  Billing Report:A KAYE report is NOT being generated.  Probe Status POST Removal: NO obvious damage

## 2022-09-03 ENCOUNTER — APPOINTMENT (OUTPATIENT)
Dept: OCCUPATIONAL THERAPY | Facility: HOSPITAL | Age: 72
DRG: 236 | End: 2022-09-03
Attending: THORACIC SURGERY (CARDIOTHORACIC VASCULAR SURGERY)
Payer: COMMERCIAL

## 2022-09-03 ENCOUNTER — APPOINTMENT (OUTPATIENT)
Dept: RADIOLOGY | Facility: HOSPITAL | Age: 72
DRG: 236 | End: 2022-09-03
Attending: PHYSICIAN ASSISTANT
Payer: COMMERCIAL

## 2022-09-03 LAB
ALBUMIN SERPL-MCNC: 3.2 G/DL (ref 3.5–5)
ALP SERPL-CCNC: 54 U/L (ref 45–120)
ALT SERPL W P-5'-P-CCNC: 15 U/L (ref 0–45)
ANION GAP SERPL CALCULATED.3IONS-SCNC: 7 MMOL/L (ref 5–18)
AST SERPL W P-5'-P-CCNC: 27 U/L (ref 0–40)
ATRIAL RATE - MUSE: 83 BPM
BASE EXCESS BLDV CALC-SCNC: 0.4 MMOL/L
BILIRUB SERPL-MCNC: 0.8 MG/DL (ref 0–1)
BUN SERPL-MCNC: 18 MG/DL (ref 8–28)
CALCIUM SERPL-MCNC: 8.3 MG/DL (ref 8.5–10.5)
CALCIUM, IONIZED MEASURED: 1.1 MMOL/L (ref 1.11–1.3)
CHLORIDE BLD-SCNC: 109 MMOL/L (ref 98–107)
CO2 SERPL-SCNC: 22 MMOL/L (ref 22–31)
CREAT SERPL-MCNC: 0.78 MG/DL (ref 0.7–1.3)
DIASTOLIC BLOOD PRESSURE - MUSE: NORMAL MMHG
ERYTHROCYTE [DISTWIDTH] IN BLOOD BY AUTOMATED COUNT: 13.6 % (ref 10–15)
GFR SERPL CREATININE-BSD FRML MDRD: >90 ML/MIN/1.73M2
GLUCOSE BLD-MCNC: 122 MG/DL (ref 70–125)
GLUCOSE BLDC GLUCOMTR-MCNC: 126 MG/DL (ref 70–99)
GLUCOSE BLDC GLUCOMTR-MCNC: 128 MG/DL (ref 70–99)
GLUCOSE BLDC GLUCOMTR-MCNC: 141 MG/DL (ref 70–99)
GLUCOSE BLDC GLUCOMTR-MCNC: 147 MG/DL (ref 70–99)
GLUCOSE BLDC GLUCOMTR-MCNC: 149 MG/DL (ref 70–99)
GLUCOSE BLDC GLUCOMTR-MCNC: 160 MG/DL (ref 70–99)
GLUCOSE BLDC GLUCOMTR-MCNC: 194 MG/DL (ref 70–99)
GLUCOSE BLDC GLUCOMTR-MCNC: 230 MG/DL (ref 70–99)
GLUCOSE BLDC GLUCOMTR-MCNC: 80 MG/DL (ref 70–99)
HCO3 BLDV-SCNC: 24 MMOL/L (ref 24–30)
HCT VFR BLD AUTO: 32.2 % (ref 40–53)
HGB BLD-MCNC: 10.8 G/DL (ref 13.3–17.7)
INTERPRETATION ECG - MUSE: NORMAL
ION CA PH 7.4: 1.08 MMOL/L (ref 1.11–1.3)
MAGNESIUM SERPL-MCNC: 2.1 MG/DL (ref 1.8–2.6)
MCH RBC QN AUTO: 31.6 PG (ref 26.5–33)
MCHC RBC AUTO-ENTMCNC: 33.5 G/DL (ref 31.5–36.5)
MCV RBC AUTO: 94 FL (ref 78–100)
OXYHGB MFR BLDV: 65.9 % (ref 70–75)
P AXIS - MUSE: 67 DEGREES
PCO2 BLDV: 50 MM HG (ref 35–50)
PH BLDV: 7.33 [PH] (ref 7.35–7.45)
PH: 7.36 (ref 7.35–7.45)
PHOSPHATE SERPL-MCNC: 3.7 MG/DL (ref 2.5–4.5)
PLATELET # BLD AUTO: 298 10E3/UL (ref 150–450)
PO2 BLDV: 36 MM HG (ref 25–47)
POTASSIUM BLD-SCNC: 4.2 MMOL/L (ref 3.5–5)
PR INTERVAL - MUSE: 170 MS
PROT SERPL-MCNC: 5.5 G/DL (ref 6–8)
QRS DURATION - MUSE: 106 MS
QT - MUSE: 394 MS
QTC - MUSE: 462 MS
R AXIS - MUSE: -18 DEGREES
RBC # BLD AUTO: 3.42 10E6/UL (ref 4.4–5.9)
SAO2 % BLDV: 67.2 % (ref 70–75)
SODIUM SERPL-SCNC: 138 MMOL/L (ref 136–145)
SYSTOLIC BLOOD PRESSURE - MUSE: NORMAL MMHG
T AXIS - MUSE: 34 DEGREES
VENTRICULAR RATE- MUSE: 83 BPM
WBC # BLD AUTO: 13.8 10E3/UL (ref 4–11)

## 2022-09-03 PROCEDURE — 82805 BLOOD GASES W/O2 SATURATION: CPT | Performed by: THORACIC SURGERY (CARDIOTHORACIC VASCULAR SURGERY)

## 2022-09-03 PROCEDURE — 250N000011 HC RX IP 250 OP 636

## 2022-09-03 PROCEDURE — 83735 ASSAY OF MAGNESIUM: CPT | Performed by: PHYSICIAN ASSISTANT

## 2022-09-03 PROCEDURE — 999N000157 HC STATISTIC RCP TIME EA 10 MIN

## 2022-09-03 PROCEDURE — 97165 OT EVAL LOW COMPLEX 30 MIN: CPT | Mod: GO

## 2022-09-03 PROCEDURE — G0463 HOSPITAL OUTPT CLINIC VISIT: HCPCS

## 2022-09-03 PROCEDURE — 82330 ASSAY OF CALCIUM: CPT | Performed by: PHYSICIAN ASSISTANT

## 2022-09-03 PROCEDURE — 250N000012 HC RX MED GY IP 250 OP 636 PS 637: Performed by: PHYSICIAN ASSISTANT

## 2022-09-03 PROCEDURE — 200N000001 HC R&B ICU

## 2022-09-03 PROCEDURE — 93010 ELECTROCARDIOGRAM REPORT: CPT | Performed by: INTERNAL MEDICINE

## 2022-09-03 PROCEDURE — 250N000013 HC RX MED GY IP 250 OP 250 PS 637: Performed by: PHYSICIAN ASSISTANT

## 2022-09-03 PROCEDURE — P9045 ALBUMIN (HUMAN), 5%, 250 ML: HCPCS

## 2022-09-03 PROCEDURE — 93005 ELECTROCARDIOGRAM TRACING: CPT

## 2022-09-03 PROCEDURE — 97110 THERAPEUTIC EXERCISES: CPT | Mod: GO

## 2022-09-03 PROCEDURE — 84100 ASSAY OF PHOSPHORUS: CPT | Performed by: PHYSICIAN ASSISTANT

## 2022-09-03 PROCEDURE — 99207 PR NO CHARGE LOS: CPT | Performed by: PHYSICIAN ASSISTANT

## 2022-09-03 PROCEDURE — 250N000011 HC RX IP 250 OP 636: Performed by: PHYSICIAN ASSISTANT

## 2022-09-03 PROCEDURE — 80053 COMPREHEN METABOLIC PANEL: CPT | Performed by: PHYSICIAN ASSISTANT

## 2022-09-03 PROCEDURE — 85014 HEMATOCRIT: CPT | Performed by: PHYSICIAN ASSISTANT

## 2022-09-03 PROCEDURE — 94799 UNLISTED PULMONARY SVC/PX: CPT

## 2022-09-03 PROCEDURE — 999N000065 XR CHEST PORT 1 VIEW

## 2022-09-03 RX ORDER — FINASTERIDE 5 MG/1
5 TABLET, FILM COATED ORAL DAILY
Status: DISCONTINUED | OUTPATIENT
Start: 2022-09-03 | End: 2022-09-07 | Stop reason: HOSPADM

## 2022-09-03 RX ORDER — CALCIUM GLUCONATE 20 MG/ML
1 INJECTION, SOLUTION INTRAVENOUS ONCE
Status: DISCONTINUED | OUTPATIENT
Start: 2022-09-03 | End: 2022-09-07 | Stop reason: HOSPADM

## 2022-09-03 RX ADMIN — ACETAMINOPHEN 975 MG: 325 TABLET, FILM COATED ORAL at 14:28

## 2022-09-03 RX ADMIN — POLYETHYLENE GLYCOL 3350 17 G: 17 POWDER, FOR SOLUTION ORAL at 08:12

## 2022-09-03 RX ADMIN — ASPIRIN 162 MG: 81 TABLET, CHEWABLE ORAL at 08:11

## 2022-09-03 RX ADMIN — OXYCODONE HYDROCHLORIDE 10 MG: 5 TABLET ORAL at 18:27

## 2022-09-03 RX ADMIN — ATORVASTATIN CALCIUM 80 MG: 40 TABLET, FILM COATED ORAL at 08:11

## 2022-09-03 RX ADMIN — CALCIUM GLUCONATE 1 G: 20 INJECTION, SOLUTION INTRAVENOUS at 04:55

## 2022-09-03 RX ADMIN — OXYCODONE HYDROCHLORIDE 10 MG: 5 TABLET ORAL at 08:11

## 2022-09-03 RX ADMIN — Medication 100 MCG: at 10:14

## 2022-09-03 RX ADMIN — HEPARIN SODIUM 5000 UNITS: 5000 INJECTION, SOLUTION INTRAVENOUS; SUBCUTANEOUS at 14:27

## 2022-09-03 RX ADMIN — ALBUMIN (HUMAN) 12.5 G: 12.5 SOLUTION INTRAVENOUS at 08:31

## 2022-09-03 RX ADMIN — CEFAZOLIN SODIUM 2 G: 2 INJECTION, SOLUTION INTRAVENOUS at 10:14

## 2022-09-03 RX ADMIN — SENNOSIDES AND DOCUSATE SODIUM 1 TABLET: 8.6; 5 TABLET ORAL at 20:48

## 2022-09-03 RX ADMIN — INSULIN GLARGINE 5 UNITS: 100 INJECTION, SOLUTION SUBCUTANEOUS at 08:26

## 2022-09-03 RX ADMIN — FLUOXETINE 60 MG: 20 CAPSULE ORAL at 10:14

## 2022-09-03 RX ADMIN — SENNOSIDES AND DOCUSATE SODIUM 1 TABLET: 8.6; 5 TABLET ORAL at 08:11

## 2022-09-03 RX ADMIN — OXYCODONE HYDROCHLORIDE 10 MG: 5 TABLET ORAL at 12:58

## 2022-09-03 RX ADMIN — ACETAMINOPHEN 975 MG: 325 TABLET, FILM COATED ORAL at 06:25

## 2022-09-03 RX ADMIN — PANTOPRAZOLE SODIUM 40 MG: 40 TABLET, DELAYED RELEASE ORAL at 08:11

## 2022-09-03 RX ADMIN — ACETAMINOPHEN 975 MG: 325 TABLET, FILM COATED ORAL at 21:02

## 2022-09-03 RX ADMIN — HEPARIN SODIUM 5000 UNITS: 5000 INJECTION, SOLUTION INTRAVENOUS; SUBCUTANEOUS at 21:03

## 2022-09-03 RX ADMIN — FINASTERIDE 5 MG: 5 TABLET, FILM COATED ORAL at 10:14

## 2022-09-03 RX ADMIN — INSULIN ASPART 2 UNITS: 100 INJECTION, SOLUTION INTRAVENOUS; SUBCUTANEOUS at 20:46

## 2022-09-03 RX ADMIN — INSULIN ASPART 2 UNITS: 100 INJECTION, SOLUTION INTRAVENOUS; SUBCUTANEOUS at 17:58

## 2022-09-03 RX ADMIN — TAMSULOSIN HYDROCHLORIDE 0.4 MG: 0.4 CAPSULE ORAL at 20:47

## 2022-09-03 RX ADMIN — OXYCODONE HYDROCHLORIDE 10 MG: 5 TABLET ORAL at 03:50

## 2022-09-03 RX ADMIN — HYDROMORPHONE HYDROCHLORIDE 0.4 MG: 0.2 INJECTION, SOLUTION INTRAMUSCULAR; INTRAVENOUS; SUBCUTANEOUS at 00:36

## 2022-09-03 ASSESSMENT — ACTIVITIES OF DAILY LIVING (ADL)
ADLS_ACUITY_SCORE: 26
ADLS_ACUITY_SCORE: 26
PREVIOUS_RESPONSIBILITIES: MEAL PREP;HOUSEKEEPING;LAUNDRY;SHOPPING;YARDWORK;MEDICATION MANAGEMENT;FINANCES;DRIVING;WORK
ADLS_ACUITY_SCORE: 26

## 2022-09-03 NOTE — PROGRESS NOTES
Room air SpO2 96-97 %, sitting up in chair, pt recently performed IS with family, performed flutter valve x 5 minutes intermittently. fair technique. RT to follow until pt able to perform routinely on his own.     /64   Pulse 79   Temp 99  F (37.2  C)   Resp 18   Wt 94.5 kg (208 lb 4.8 oz)   SpO2 96%   BMI 26.74 kg/m

## 2022-09-03 NOTE — PROGRESS NOTES
09/03/22 0236   Home O2/Equipment Set-Up   Home O2 Device CPAP   Pt Owned Device Yes;Clean;Functional

## 2022-09-03 NOTE — PROGRESS NOTES
Care Management Initial Consult       Concerns to be Addressed:   Care post CABG 9/2/2022 (see op note)  Patient plan of care discussed at interdisciplinary rounds: Yes    Anticipated Discharge Disposition:  Home     Anticipated Discharge Services:  Outpatient cardiac rehab  Anticipated Discharge DME:  Per therapy (if indicated).    Patient/family educated on Medicare website which has current facility and service quality ratings:  NA  Education Provided on the Discharge Plan:  Per team  Patient/Family in Agreement with the Plan:  yes    Referrals Placed by CM/SW:  None  Private pay costs discussed: Not applicable     General Information  Assessment completed with: Nuzhat Bala  Type of CM/SW Visit: Initial     Primary Care Provider verified and updated as needed:     Readmission within the last 30 days:        Reason for Consult: discharge planning  Advance Care Planning: Advance Care Planning Reviewed: present on chart  Spouse primary HCA, daughter first alternate     Communication Assessment  Patient's communication style: spoken language (English or Bilingual)    Hearing Difficulty or Deaf: no   Wear Glasses or Blind: yes    Cognitive  Cognitive/Neuro/Behavioral: WDL  Level of Consciousness: alert  Arousal Level: opens eyes spontaneously  Orientation: oriented x 4  Mood/Behavior: flat affect, cooperative  Best Language: 0 - No aphasia  Speech: clear    Living Environment:   People in home: spouse  Marylou  Current living Arrangements: house      Able to return to prior arrangements:  yes     Family/Social Support:  Care provided by: self  Provides care for: no one  Marital Status:   Wife, Children  Marylou       Description of Support System: Supportive       Current Resources:   Patient receiving home care services: No     Community Resources: None  Equipment currently used at home: none  Supplies currently used at home: None    Employment/Financial:  Employment Status:          Financial  Concerns: No concerns identified   Referral to Financial Worker: No     Lifestyle & Psychosocial Needs:  Social Determinants of Health     Tobacco Use: Low Risk      Smoking Tobacco Use: Never Smoker     Smokeless Tobacco Use: Never Used   Alcohol Use: Not on file   Financial Resource Strain: Not on file   Food Insecurity: Not on file   Transportation Needs: Not on file   Physical Activity: Not on file   Stress: Not on file   Social Connections: Not on file   Intimate Partner Violence: Not on file   Depression: Not on file   Housing Stability: Not on file     Functional Status:  Prior to admission patient needed assistance:   Dependent ADLs:: Independent  Dependent IADLs:: Independent     Mental Health Status:  Mental Health Status: No Current Concerns       Chemical Dependency Status:  Chemical Dependency Status: No Current Concerns           Values/Beliefs:  Spiritual, Cultural Beliefs, Congregational Practices, Values that affect care:            Values/Beliefs Comment: Taoist    Additional Information:  Patient is  and largely independent with activities of daily living at baseline. Goal is home with wife Marylou's support. Cardiac rehab's current recommendation is outpatient cardiac rehab. However, CM will continue to monitor progression of care, review team recommendations and provide discharge planning assist as needed.      Aleksandra Medina RN

## 2022-09-03 NOTE — PROGRESS NOTES
CVTS Daily Progress Note   POD # 1 S/P CABG X 2  Attending: Dr. Carline Bradshaw MD  LOS: 1    SUBJECTIVE/INTERVAL EVENTS:  Patient arrived to ICU from OR yesterday afternoon. He was subsequently extubated and weaning from pressors.   No acute events overnight. Patient progressing well. States pain is well managed on current regimen. Slept well overnight.  Maintaining oxygen saturations on supplemental oxygen 2 L nasal cannula. Normotensive on relief of phenylephrine drip. Will ambulate with therapy once lines are out. + Bowel sounds but no BM yet. Tolerating diet. UOP adequate. Chest tube output appropriate.   Patient denies new chest pain, shortness of breath, abdominal pain, calf pain, nausea. Patient has no questions today.    OBJECTIVE:  Temp:  [96.3  F (35.7  C)-99.7  F (37.6  C)] 99  F (37.2  C)  Pulse:  [77-90] 77  Resp:  [16-19] 18  BP: ()/(53-72) 94/53  MAP:  [62 mmHg-116 mmHg] 75 mmHg  Arterial Line BP: ()/(47-85) 102/58  FiO2 (%):  [40 %-60 %] 40 %  SpO2:  [89 %-100 %] 93 %  Vent Mode: CPAP/PS  (Continuous positive airway pressure with Pressure Support)  FiO2 (%): 40 %  Resp Rate (Set): 16 breaths/min  Tidal Volume (Set, mL): 650 mL  PEEP (cm H2O): 5 cmH2O  Pressure Support (cm H2O): 5 cmH2O  Resp: 18    Vitals:    09/02/22 0530 09/03/22 0545   Weight: 91.9 kg (202 lb 8 oz) 94.5 kg (208 lb 4.8 oz)       Current Medications:    Scheduled Meds:    acetaminophen  975 mg Oral Q8H     aspirin  162 mg Oral or NG Tube Daily     atorvastatin  80 mg Oral Daily     calcium gluconate  1 g Intravenous Once     ceFAZolin  2 g Intravenous Q8H     fentaNYL  100 mcg Intravenous Once     finasteride  5 mg Oral Daily     FLUoxetine  60 mg Oral Daily     heparin ANTICOAGULANT  5,000 Units Subcutaneous Q8H     insulin aspart  1-7 Units Subcutaneous Q4H     midazolam  1 mg Intravenous Once     pantoprazole  40 mg Oral or NG Tube Daily    Or     pantoprazole  40 mg Oral Daily     polyethylene glycol  17 g Oral  Daily     senna-docusate  1 tablet Oral BID     sodium chloride (PF)  3 mL Intracatheter Q8H     tamsulosin  0.4 mg Oral QPM     vitamin D3  100 mcg Oral Daily     Continuous Infusions:    dexmedetomidine Stopped (09/02/22 1734)     EPINEPHrine Stopped (09/02/22 1214)     insulin regular 1 Units/hr (09/03/22 0821)     niCARdipine Stopped (09/02/22 1542)     phenylephrine 0.2 mcg/kg/min (09/03/22 0524)     vasopressin       PRN Meds:.[START ON 9/5/2022] acetaminophen, bisacodyl, calcium gluconate, calcium gluconate, calcium gluconate, glucose **OR** dextrose **OR** glucagon, EPINEPHrine, hydrALAZINE, HYDROmorphone **OR** HYDROmorphone, lactated ringers, lidocaine 4%, lidocaine (buffered or not buffered), magnesium hydroxide, naloxone **OR** naloxone **OR** naloxone **OR** naloxone, niCARdipine, ondansetron **OR** ondansetron, oxyCODONE **OR** oxyCODONE, phenylephrine, prochlorperazine **OR** prochlorperazine, sodium chloride (PF), vasopressin    Cardiographics:    Telemetry monitoring demonstrates NSR with rates in the 70s per my personal review.    Imaging:  All available imaging reviewed, no acute concerns    Labs, personally reviewed.  BMP  Recent Labs   Lab 09/03/22  0821 09/03/22  0622 09/03/22  0404 09/03/22  0403 09/02/22  1207 09/02/22  1200   NA  --   --  138  --   --  139   POTASSIUM  --   --  4.2  --   --  3.5  3.5   CHLORIDE  --   --  109*  --   --  109*   EVELYNE  --   --  8.3*  --   --  8.6   CO2  --   --  22  --   --  22   BUN  --   --  18  --   --  16   CR  --   --  0.78  --   --  0.85   * 149* 122 126*   < > 197*    < > = values in this interval not displayed.     CBC  Recent Labs   Lab 09/03/22  0404 09/02/22  1200 09/02/22  1113   WBC 13.8* 15.4* 24.2*   RBC 3.42* 3.35* 3.11*   HGB 10.8* 10.6* 9.8*   HCT 32.2* 31.2* 29.5*   MCV 94 93 95   MCH 31.6 31.6 31.5   MCHC 33.5 34.0 33.2   RDW 13.6 13.2 13.2    209 257     INR  Recent Labs   Lab 09/02/22  1200 09/02/22  1113   INR 1.37* 1.43*       Hepatic Panel  Recent Labs   Lab 09/03/22  0404 09/02/22  1200   AST 27 17   ALT 15 15   ALKPHOS 54 51   BILITOTAL 0.8 0.5   ALBUMIN 3.2* 2.6*       Magnesium   Date Value Ref Range Status   09/03/2022 2.1 1.8 - 2.6 mg/dL Final   09/02/2022 2.8 (H) 1.8 - 2.6 mg/dL Final   08/26/2022 2.0 1.8 - 2.6 mg/dL Final        I/O:  I/O last 3 completed shifts:  In: 4947.15 [P.O.:200; I.V.:3737.15; Other:360; IV Piggyback:150]  Out: 1969 [Urine:1430; Chest Tube:539]       Blood pressure 94/53, pulse 77, temperature 99  F (37.2  C), resp. rate 18, weight 94.5 kg (208 lb 4.8 oz), SpO2 93 %.  Vitals:    09/02/22 0530 09/03/22 0545   Weight: 91.9 kg (202 lb 8 oz) 94.5 kg (208 lb 4.8 oz)        Physical Exam:  Weight 94.5 kg, preop weight 91.9 kg.   24 hr Fluid status; net loss 1969 mL. UOP 1430 mL  MAPs: 74    Gen: A&Ox4, out of bed to chair, with NAD  Neuro: no focal deficits   CV: RRR, normal S1 S2, no murmurs, rubs or gallops.  No JVD  Pulm: Lungs are diminished in the bases bilaterally, no wheezing or rhonchi, normal breathing on 2 lpm  Abd: nondistended, normal BS, soft, nontender  Ext: Mild peripheral edema appreciated  Incision: clean, dry, intact, no erythema, sternum stable  Tubes/drain sites: dressing clean and dry, serosanguinous output, no air leak. 24 hr output 180/539 mL.     ASSESSMENT/PLAN:  Bala Hopper is a 71 year old male with a history of BPH, depression, diabetes mellitus, hyperlipidemia and obstructive sleep apnea who is POD #1 S/P CABG X 2.  Arrival in the intensive care unit, patient was able to wean and extubate to supplemental oxygen  Out of bed to chair, with no apparent distress  Complement incisional chest pain no overt shortness of breath  Postop cardiovascular insufficiency treatment with phenylephrine drip 0.2 mcg/kg/min  Postop respiratory insufficiency treating with supplemental oxygen 2 L nasal cannula  Adequate inspiratory effort, I-S 1000 cc  Diabetes mellitus currently on insulin  drip  Lantus 5 units subcu x1 dose followed by Q 4 hrs SSI  Incisions are clean dry and intact, lungs sound diminished to bases  Chest tube drainage 180/539 ml  Weight 94.5 kg, preop weight 91.9 kg  We will hold off diuresis till patient is off phenylephrine drip and then will give Lasix 20 mg IV twice daily      NEURO:   - Scheduled Tylenol and PRN oxycodone for pain  - Melatonin QHS    CV:   - Normotensive  - Metoprolol 12.5 mg two times a day starting tonight  - ASA 162mg  - Atorvastatin 80 mg daily  - Chest tubes to remain today    PULM:   - Maintaining oxygen saturations on supplemental oxygen 2 L via cannula  - Encourage pulmonary toilet    FEN/GI:  - Continue electrolyte replacement protocol  - Diet: Cardiac, ADAT   - Bowel regimen    RENAL:  - Adequate UOP/hr. Continue to monitor closely via napoles.   - Cr 0.78  - Napoles to remain in for close monitoring of I/O and during period of diuresis/relative immobility.   - Diuresis with Lasix 20mg IV  two times a day    HEME:  - Acute blood loss anemia post-op.   - Hgb stable, no bleeding concerns. Hep SQ, [id number]    ID:  - Jessy op ppx complete, afebrile . No concerns for infection    ENDO:   - Transition to SSI    PPx:   - DVT: SCDs, SQ heparin TID, ambulation   - GI: Protonix 40mg PO daily    DISPO:   - Continue critical care in ICU      Patient discussed with Dr. Aleksandra Bradshaw MD.    Sridhar Bro PA-C  Cardiothoracic Surgery  Pager [phone]    10:19 AM   September 3, 2022

## 2022-09-03 NOTE — PROGRESS NOTES
Pulmonary/Critical Care  9/3/2022  8:37 AM     Chart reviewed.   Patient up in chair this morning and doing well.   Our service will sign off.     Vicki Banda CNP  [facility] Pulmonary/Critical Care

## 2022-09-03 NOTE — PLAN OF CARE
[facility] - ICU    RN Progress Note:            Pertinent Assessments:      Please refer to flowsheet rows for full assessment     Pt is A/O but occasionally forgetful re: time of events but reorients easily. VSS on low dose Will gtt weaned from o.4 to 0.2 mcg. Low grade temp. Uses IS to 1000ml and has fair cough effort, nonproductive. Pain controlled with Oxycodone and scheduled Tylenol but did require Dilaudid X 1 after turned early I shift.          Mobility Level:     4, Stood at bedside and up to chair this am without difficulty.          Key Events - This Shift:     No acute events              Plan:     Continue to monitor and promote pulmonary toilet.         Point of Contact Update Not done

## 2022-09-03 NOTE — PLAN OF CARE
[facility] - ICU    RN Progress Note:            Pertinent Assessments:      Please refer to flowsheet rows for full assessment   A +O, slightly forgetful but easily oriented and pleasant.         Mobility Level:     OOB w/1 assist, able to sit in chair >2hrs         Key Events - This Shift:     Off pressor, central lines out              Plan:     Cardiac rehab, pulmonary toilet         Point of Contact Update YES-OR-NO: Yes    Name: Justice  Phone Number: in chart  Summary of Conversation: at bedside, aware of POC        Goal Outcome Evaluation:

## 2022-09-03 NOTE — PROGRESS NOTES
09/03/22 0945   Quick Adds   Type of Visit Initial Occupational Therapy Evaluation   Living Environment   People in Home spouse   Current Living Arrangements house   Home Accessibility stairs within home   Number of Stairs, Within Home, Primary greater than 10 stairs   Stair Railings, Within Home, Primary railings safe and in good condition   Transportation Anticipated family or friend will provide   Living Environment Comments Works part time as insurance sales.   Self-Care   Usual Activity Tolerance good  (reports chronic dizziness and low energy)   Current Activity Tolerance moderate   Regular Exercise No   Equipment Currently Used at Home none   Activity/Exercise/Self-Care Comment independent with self cares   Instrumental Activities of Daily Living (IADL)   Previous Responsibilities meal prep;housekeeping;laundry;shopping;yardwork;medication management;finances;driving;work   IADL Comments pt states he hasnt been able to participate in household chores as much lately and he hopes to feel good enough to contribute more now   General Information   Onset of Illness/Injury or Date of Surgery 09/02/22   Referring Physician REBEL Longoria   Patient/Family Therapy Goal Statement (OT) return home with wife and attend outpatient cardiac rehab   Existing Precautions/Restrictions sternal;cardiac   Limitations/Impairments   (chronic dizziness)   Left Upper Extremity (Weight-bearing Status) partial weight-bearing (PWB)   Right Upper Extremity (Weight-bearing Status) partial weight-bearing (PWB)   Cognitive Status Examination   Affect/Mental Status (Cognitive) WNL   Follows Commands WNL   Strength Comprehensive (MMT)   Comment, General Manual Muscle Testing (MMT) Assessment WFL   Transfers   Transfers sit-stand transfer   Sit-Stand Transfer   Sit-Stand [place] (Transfers) minimum assist (75% patient effort)  (due to extra lines and tube)   Sit/Stand Transfer Comments limited with activity due to low blood  pressures and extra tubes and lines   Balance   Balance Assessment no deficits were identified   Clinical Impression   Criteria for Skilled Therapeutic Interventions Met (OT) Yes, treatment indicated   OT Diagnosis decreased functional moblity   Influenced by the following impairments CABG   OT Problem List-Impairments impacting ADL problems related to;activity tolerance impaired;post-surgical precautions;pain;mobility   Assessment of Occupational Performance 1-3 Performance Deficits   Identified Performance Deficits functional mobility with transfers and bed moblity.  endurance   Planned Therapy Interventions (OT) bed mobility training;progressive activity/exercise;home program guidelines;strengthening   Clinical Decision Making Complexity (OT) low complexity   Risk & Benefits of therapy have been explained evaluation/treatment results reviewed;care plan/treatment goals reviewed;risks/benefits reviewed;current/potential barriers reviewed;participants voiced agreement with care plan;participants included;patient;spouse/significant other   OT Discharge Planning   OT Discharge Recommendation (DC Rec) home with outpatient cardiac rehab   OT Rationale for DC Rec patients wife can provide 24 hour care first week plus   Total Evaluation Time (Minutes)   Total Evaluation Time (Minutes) 10   OT Goals   Therapy Frequency (OT) 2 times/day   OT Predicted Duration/Target Date for Goal Attainment 09/09/22   OT Goals Cardiac Phase 1   OT: Understanding of cardiac education to maximize quality of life, condition management, and health outcomes Patient;Caregiver;Verbalize;Demonstrate   OT: Perform aerobic activity with stable cardiovascular response intermittent;10 minutes;ambulation;NuStep   OT: Functional/aerobic ambulation tolerance with stable cardiovascular response in order to return to home and community environment Supervision/SBA;Within precautions;Greater than 300 feet   OT: Navigation of stairs simulating home set up  with stable cardiovascular response in order to return to home and community environment Supervision/SBA;Greater than 10 stairs

## 2022-09-03 NOTE — PROGRESS NOTES
RCAT Treatment Plan    Patient Score: 6  Patient Acuity: 4    Clinical Indication for Therapy: atelectasis, s/p 2 V CABG    Therapy Ordered: Flutter valve QID    Assessment Summary: s/p 2 V CABG 09/02/22, room air SpO2 96 %,  BS clear bilat, diminished RLL. No hx pulmonary disease, never smoked, Pt slightly confused and doesn't perform flutter valve consistently or effectively at this time. CXR 9/03/22 Stable Rt chest tube, Lt basilar atelectasis, small Lt pleural effusion. Pt is ambulating with rehab staff. RT to continue to assist pt with flutter valve until pt is performing routinely on his own.     Yuriy Rich, RT  9/3/2022

## 2022-09-04 ENCOUNTER — APPOINTMENT (OUTPATIENT)
Dept: OCCUPATIONAL THERAPY | Facility: HOSPITAL | Age: 72
DRG: 236 | End: 2022-09-04
Attending: THORACIC SURGERY (CARDIOTHORACIC VASCULAR SURGERY)
Payer: COMMERCIAL

## 2022-09-04 LAB
CALCIUM, IONIZED MEASURED: 1.11 MMOL/L (ref 1.11–1.3)
GLUCOSE BLDC GLUCOMTR-MCNC: 139 MG/DL (ref 70–99)
GLUCOSE BLDC GLUCOMTR-MCNC: 169 MG/DL (ref 70–99)
GLUCOSE BLDC GLUCOMTR-MCNC: 169 MG/DL (ref 70–99)
GLUCOSE BLDC GLUCOMTR-MCNC: 188 MG/DL (ref 70–99)
GLUCOSE BLDC GLUCOMTR-MCNC: 196 MG/DL (ref 70–99)
GLUCOSE BLDC GLUCOMTR-MCNC: 241 MG/DL (ref 70–99)
HOLD SPECIMEN: NORMAL
ION CA PH 7.4: 1.11 MMOL/L (ref 1.11–1.3)
LACTATE SERPL-SCNC: 3.2 MMOL/L (ref 0.7–2)
MAGNESIUM SERPL-MCNC: 1.8 MG/DL (ref 1.8–2.6)
PH: 7.41 (ref 7.35–7.45)
PHOSPHATE SERPL-MCNC: 1.7 MG/DL (ref 2.5–4.5)
PHOSPHATE SERPL-MCNC: 2.1 MG/DL (ref 2.5–4.5)
POTASSIUM BLD-SCNC: 4 MMOL/L (ref 3.5–5)
PROCALCITONIN SERPL-MCNC: 0.1 NG/ML (ref 0–0.49)

## 2022-09-04 PROCEDURE — 84132 ASSAY OF SERUM POTASSIUM: CPT | Performed by: THORACIC SURGERY (CARDIOTHORACIC VASCULAR SURGERY)

## 2022-09-04 PROCEDURE — 250N000013 HC RX MED GY IP 250 OP 250 PS 637: Performed by: PHYSICIAN ASSISTANT

## 2022-09-04 PROCEDURE — 250N000011 HC RX IP 250 OP 636: Performed by: PHYSICIAN ASSISTANT

## 2022-09-04 PROCEDURE — 97110 THERAPEUTIC EXERCISES: CPT | Mod: GO

## 2022-09-04 PROCEDURE — 250N000013 HC RX MED GY IP 250 OP 250 PS 637: Performed by: THORACIC SURGERY (CARDIOTHORACIC VASCULAR SURGERY)

## 2022-09-04 PROCEDURE — 210N000001 HC R&B IMCU HEART CARE

## 2022-09-04 PROCEDURE — 84100 ASSAY OF PHOSPHORUS: CPT | Performed by: THORACIC SURGERY (CARDIOTHORACIC VASCULAR SURGERY)

## 2022-09-04 PROCEDURE — 36415 COLL VENOUS BLD VENIPUNCTURE: CPT | Performed by: PHYSICIAN ASSISTANT

## 2022-09-04 PROCEDURE — 36415 COLL VENOUS BLD VENIPUNCTURE: CPT | Performed by: THORACIC SURGERY (CARDIOTHORACIC VASCULAR SURGERY)

## 2022-09-04 PROCEDURE — 84145 PROCALCITONIN (PCT): CPT | Performed by: PHYSICIAN ASSISTANT

## 2022-09-04 PROCEDURE — 999N000157 HC STATISTIC RCP TIME EA 10 MIN

## 2022-09-04 PROCEDURE — 83605 ASSAY OF LACTIC ACID: CPT | Performed by: THORACIC SURGERY (CARDIOTHORACIC VASCULAR SURGERY)

## 2022-09-04 PROCEDURE — 83735 ASSAY OF MAGNESIUM: CPT | Performed by: THORACIC SURGERY (CARDIOTHORACIC VASCULAR SURGERY)

## 2022-09-04 PROCEDURE — 82330 ASSAY OF CALCIUM: CPT | Performed by: PHYSICIAN ASSISTANT

## 2022-09-04 RX ORDER — METOPROLOL TARTRATE 25 MG/1
25 TABLET, FILM COATED ORAL 2 TIMES DAILY
Status: DISCONTINUED | OUTPATIENT
Start: 2022-09-04 | End: 2022-09-07

## 2022-09-04 RX ORDER — FUROSEMIDE 10 MG/ML
40 INJECTION INTRAMUSCULAR; INTRAVENOUS EVERY 12 HOURS
Status: DISCONTINUED | OUTPATIENT
Start: 2022-09-04 | End: 2022-09-05

## 2022-09-04 RX ORDER — MAGNESIUM OXIDE 400 MG/1
400 TABLET ORAL EVERY 4 HOURS
Status: COMPLETED | OUTPATIENT
Start: 2022-09-04 | End: 2022-09-04

## 2022-09-04 RX ADMIN — Medication 100 MCG: at 08:04

## 2022-09-04 RX ADMIN — METFORMIN HYDROCHLORIDE 1000 MG: 500 TABLET ORAL at 17:41

## 2022-09-04 RX ADMIN — ATORVASTATIN CALCIUM 80 MG: 40 TABLET, FILM COATED ORAL at 08:04

## 2022-09-04 RX ADMIN — FLUOXETINE 60 MG: 20 CAPSULE ORAL at 08:04

## 2022-09-04 RX ADMIN — ACETAMINOPHEN 975 MG: 325 TABLET, FILM COATED ORAL at 21:07

## 2022-09-04 RX ADMIN — TAMSULOSIN HYDROCHLORIDE 0.4 MG: 0.4 CAPSULE ORAL at 21:08

## 2022-09-04 RX ADMIN — METOPROLOL TARTRATE 25 MG: 25 TABLET, FILM COATED ORAL at 12:00

## 2022-09-04 RX ADMIN — FUROSEMIDE 40 MG: 10 INJECTION, SOLUTION INTRAMUSCULAR; INTRAVENOUS at 12:00

## 2022-09-04 RX ADMIN — FINASTERIDE 5 MG: 5 TABLET, FILM COATED ORAL at 08:03

## 2022-09-04 RX ADMIN — SENNOSIDES AND DOCUSATE SODIUM 1 TABLET: 8.6; 5 TABLET ORAL at 21:11

## 2022-09-04 RX ADMIN — POTASSIUM & SODIUM PHOSPHATES POWDER PACK 280-160-250 MG 2 PACKET: 280-160-250 PACK at 12:00

## 2022-09-04 RX ADMIN — Medication 400 MG: at 12:00

## 2022-09-04 RX ADMIN — HEPARIN SODIUM 5000 UNITS: 5000 INJECTION, SOLUTION INTRAVENOUS; SUBCUTANEOUS at 06:08

## 2022-09-04 RX ADMIN — HEPARIN SODIUM 5000 UNITS: 5000 INJECTION, SOLUTION INTRAVENOUS; SUBCUTANEOUS at 21:10

## 2022-09-04 RX ADMIN — HEPARIN SODIUM 5000 UNITS: 5000 INJECTION, SOLUTION INTRAVENOUS; SUBCUTANEOUS at 14:20

## 2022-09-04 RX ADMIN — POLYETHYLENE GLYCOL 3350 17 G: 17 POWDER, FOR SOLUTION ORAL at 08:03

## 2022-09-04 RX ADMIN — ACETAMINOPHEN 975 MG: 325 TABLET, FILM COATED ORAL at 06:54

## 2022-09-04 RX ADMIN — METFORMIN HYDROCHLORIDE 1000 MG: 500 TABLET ORAL at 07:59

## 2022-09-04 RX ADMIN — SENNOSIDES AND DOCUSATE SODIUM 1 TABLET: 8.6; 5 TABLET ORAL at 08:04

## 2022-09-04 RX ADMIN — INSULIN ASPART 1 UNITS: 100 INJECTION, SOLUTION INTRAVENOUS; SUBCUTANEOUS at 04:07

## 2022-09-04 RX ADMIN — CALCIUM GLUCONATE 1 G: 20 INJECTION, SOLUTION INTRAVENOUS at 07:43

## 2022-09-04 RX ADMIN — MAGNESIUM HYDROXIDE 30 ML: 2400 SUSPENSION ORAL at 21:27

## 2022-09-04 RX ADMIN — OXYCODONE HYDROCHLORIDE 5 MG: 5 TABLET ORAL at 00:07

## 2022-09-04 RX ADMIN — METOPROLOL TARTRATE 25 MG: 25 TABLET, FILM COATED ORAL at 21:07

## 2022-09-04 RX ADMIN — POTASSIUM & SODIUM PHOSPHATES POWDER PACK 280-160-250 MG 2 PACKET: 280-160-250 PACK at 17:39

## 2022-09-04 RX ADMIN — ACETAMINOPHEN 975 MG: 325 TABLET, FILM COATED ORAL at 14:20

## 2022-09-04 RX ADMIN — POTASSIUM & SODIUM PHOSPHATES POWDER PACK 280-160-250 MG 2 PACKET: 280-160-250 PACK at 07:00

## 2022-09-04 RX ADMIN — PANTOPRAZOLE SODIUM 40 MG: 40 TABLET, DELAYED RELEASE ORAL at 08:04

## 2022-09-04 RX ADMIN — Medication 400 MG: at 06:54

## 2022-09-04 RX ADMIN — INSULIN ASPART 1 UNITS: 100 INJECTION, SOLUTION INTRAVENOUS; SUBCUTANEOUS at 00:02

## 2022-09-04 RX ADMIN — ASPIRIN 162 MG: 81 TABLET, CHEWABLE ORAL at 08:04

## 2022-09-04 ASSESSMENT — ACTIVITIES OF DAILY LIVING (ADL)
ADLS_ACUITY_SCORE: 26
ADLS_ACUITY_SCORE: 29
ADLS_ACUITY_SCORE: 26
ADLS_ACUITY_SCORE: 26

## 2022-09-04 NOTE — PLAN OF CARE
Problem: Activity Intolerance (Cardiovascular Surgery)  Goal: Improved Activity Tolerance  Outcome: Ongoing, Progressing   Goal Outcome Evaluation:    Patient up ambulating in room to bathroom and back, multiple times, well tolerated. He did urinate three times, however, bladder scan showed 400mL, straight cath performed and got 350ml output, 50mL bladder scan after. While ambulating with therapy, he did have a brief orthostatic BP episode which then resolved.

## 2022-09-04 NOTE — PROGRESS NOTES
CVTS Daily Progress Note   POD # 2 S/P CABG X 2  Attending: Dr. Carline Bradshaw MD  LOS: 2    SUBJECTIVE/INTERVAL EVENTS:  No acute events overnight. Patient progressing well. States pain is well managed on current regimen. Slept well overnight.  Maintaining oxygen saturations on  room air. Normotensive and normal sinus rhythm per monitor.   Ambulating with therapy and tolerating well. + Bowel sounds but no BM yet. Tolerating diet. UOP adequate. Chest tube and pacer wires removed 09/04/2022.   Patient denies new chest pain, shortness of breath, abdominal pain, calf pain, nausea. Patient has no questions today.      OBJECTIVE:  Temp:  [98.6  F (37  C)-99.2  F (37.3  C)] 98.7  F (37.1  C)  Pulse:  [74-92] 84  Resp:  [15-22] 22  BP: ()/(53-81) 127/66  MAP:  [68 mmHg-83 mmHg] 73 mmHg  Arterial Line BP: ()/(53-62) 99/54  SpO2:  [91 %-99 %] 95 %  Resp: 22    Vitals:    09/02/22 0530 09/03/22 0545 09/04/22 0600   Weight: 91.9 kg (202 lb 8 oz) 94.5 kg (208 lb 4.8 oz) 94.9 kg (209 lb 3.2 oz)       Current Medications:    Scheduled Meds:    acetaminophen  975 mg Oral Q8H     aspirin  162 mg Oral or NG Tube Daily     atorvastatin  80 mg Oral Daily     calcium gluconate  1 g Intravenous Once     finasteride  5 mg Oral Daily     FLUoxetine  60 mg Oral Daily     heparin ANTICOAGULANT  5,000 Units Subcutaneous Q8H     insulin aspart  1-7 Units Subcutaneous TID AC     insulin aspart  1-5 Units Subcutaneous At Bedtime     magnesium oxide  400 mg Oral Q4H     metFORMIN  1,000 mg Oral BID w/meals     midazolam  1 mg Intravenous Once     pantoprazole  40 mg Oral or NG Tube Daily    Or     pantoprazole  40 mg Oral Daily     polyethylene glycol  17 g Oral Daily     potassium & sodium phosphates  2 packet Oral or Feeding Tube Q4H     senna-docusate  1 tablet Oral BID     sodium chloride (PF)  3 mL Intracatheter Q8H     tamsulosin  0.4 mg Oral QPM     vitamin D3  100 mcg Oral Daily     Continuous Infusions:    dexmedetomidine  Stopped (09/02/22 1734)     EPINEPHrine Stopped (09/02/22 1214)     phenylephrine Stopped (09/03/22 1123)     vasopressin       PRN Meds:.[START ON 9/5/2022] acetaminophen, bisacodyl, calcium gluconate, calcium gluconate, calcium gluconate, glucose **OR** dextrose **OR** glucagon, EPINEPHrine, hydrALAZINE, HYDROmorphone **OR** HYDROmorphone, lactated ringers, lidocaine 4%, lidocaine (buffered or not buffered), magnesium hydroxide, naloxone **OR** naloxone **OR** naloxone **OR** naloxone, ondansetron **OR** ondansetron, oxyCODONE **OR** oxyCODONE, phenylephrine, prochlorperazine **OR** prochlorperazine, sodium chloride (PF), vasopressin    Cardiographics:    Telemetry monitoring demonstrates NSR with rates in the 80s per my personal review.    Imaging:  All available imaging reviewed, no acute concerns    Labs, personally reviewed.  BMP  Recent Labs   Lab 09/04/22  0739 09/04/22  0525 09/04/22  0407 09/04/22  0002 09/03/22  2043 09/03/22  0622 09/03/22  0404 09/02/22  1207 09/02/22  1200   NA  --   --   --   --   --   --  138  --  139   POTASSIUM  --  4.0  --   --   --   --  4.2  --  3.5  3.5   CHLORIDE  --   --   --   --   --   --  109*  --  109*   EVELYNE  --   --   --   --   --   --  8.3*  --  8.6   CO2  --   --   --   --   --   --  22  --  22   BUN  --   --   --   --   --   --  18  --  16   CR  --   --   --   --   --   --  0.78  --  0.85   *  --  169* 169* 230*   < > 122   < > 197*    < > = values in this interval not displayed.     CBC  Recent Labs   Lab 09/03/22  0404 09/02/22  1200 09/02/22  1113   WBC 13.8* 15.4* 24.2*   RBC 3.42* 3.35* 3.11*   HGB 10.8* 10.6* 9.8*   HCT 32.2* 31.2* 29.5*   MCV 94 93 95   MCH 31.6 31.6 31.5   MCHC 33.5 34.0 33.2   RDW 13.6 13.2 13.2    209 257     INR  Recent Labs   Lab 09/02/22  1200 09/02/22  1113   INR 1.37* 1.43*      Hepatic Panel  Recent Labs   Lab 09/03/22  0404 09/02/22  1200   AST 27 17   ALT 15 15   ALKPHOS 54 51   BILITOTAL 0.8 0.5   ALBUMIN 3.2* 2.6*        Magnesium   Date Value Ref Range Status   09/04/2022 1.8 1.8 - 2.6 mg/dL Final   09/03/2022 2.1 1.8 - 2.6 mg/dL Final   09/02/2022 2.8 (H) 1.8 - 2.6 mg/dL Final        I/O:  I/O last 3 completed shifts:  In: 850 [P.O.:600]  Out: 883 [Urine:558; Chest Tube:325]       Blood pressure 127/66, pulse 84, temperature 98.7  F (37.1  C), temperature source Tympanic, resp. rate 22, weight 94.9 kg (209 lb 3.2 oz), SpO2 95 %.  Vitals:    09/02/22 0530 09/03/22 0545 09/04/22 0600   Weight: 91.9 kg (202 lb 8 oz) 94.5 kg (208 lb 4.8 oz) 94.9 kg (209 lb 3.2 oz)        Physical Exam:  Weight 94.9 kg from 94.5 kg yesterday, preop weight 91.9 kg.   24 hr Fluid status; net loss -33 mL.  mL  MAPs: 90    Gen: A&Ox4, out of bed to chair, with NAD  Neuro: no focal deficits   CV: RRR, normal S1 S2, no murmurs, rubs or gallops.  No JVD  Pulm: CTA, no wheezing or rhonchi, normal breathing on RA  Abd: nondistended, normal BS, soft, nontender  Ext: Mild bilateral lower extremity edema appreciated  Incision: clean, dry, intact, no erythema, sternum stable  Tubes/drain sites: dressing clean and dry, serosanguinous output, no air leak. 24 hr output 15/325 mL.     ASSESSMENT/PLAN:  Bala Hopper is a 71 year old male with a history of BPH, depression, diabetes mellitus, hyperlipidemia and obstructive sleep apnea who is POD #2 S/P CABG X 2.  Awake and alert, out of bed to chair, with no apparent distress  AVSS, normal sinus rhythm per monitor  Denies incisional chest pain and no shortness of breath  Postop respiratory insufficiency resolved, room air saturation 95%  Adequate inspiratory effort, I-S 1000 cc  Diabetes mellitus currently on AC & HS SSII  Incisions are clean dry and intact, lungs sound diminished to bases  Chest tube drainage 15/325 ml, chest tubes removed 09/04/2022  Weight 94.9 kg from 94.5 kg yesterday, preop weight 91.9 kg  Lasix 40 mg IV twice daily metoprolol 25 mg twice a day  Continue pulmonary toilet and  mobilize ambulate in the hallway 3-4 times daily  Transfer to telemetry floor today       NEURO:   - Scheduled Tylenol and PRN oxycodone for pain  - Melatonin QHS    CV:   - Normotensive  - Metoprolol  25 mg two times a day starting tonight  - ASA 162mg  - Atorvastatin 80 mg daily  - Chest tubes and pacer wires removed 09/04/2022    PULM:   - Maintaining oxygen saturations on  room air  - Encourage pulmonary toilet    FEN/GI:  - Continue electrolyte replacement protocol  - Diet: Cardiac, ADAT   - Bowel regimen    RENAL:  - Adequate UOP/hr. Continue to monitor closely via napoles.   - Cr 0.78  - Napoles catheter removed 09/04/2022.   - Diuresis with Lasix 40mg IV  two times a day    HEME:  - Acute blood loss anemia post-op.   - Hgb stable, no bleeding concerns. Hep SQ, [id number]    ID:  - Jessy op ppx complete, afebrile . No concerns for infection    ENDO:   - Transition to SSI     PPx:   - DVT: SCDs, SQ heparin TID, ambulation   - GI: Protonix 40mg PO daily    DISPO:   -Transfer to telemetry floor        Patient discussed with Dr. Aleksandra Bradshaw MD.     Sridhar Bro PA-C  Cardiothoracic Surgery  Pager [phone]    9:27 AM   September 4, 2022

## 2022-09-04 NOTE — PROGRESS NOTES
Alert and oriented. Vitals stable. Denied any pain. Chest tubes removed by REBEL Bro. Tom removed at 1000, educated and  encouraged pt to void. Pt was downgraded from ICU and transferred to cardiac floor at 1030. Report given Lorenzo Henderson RN. Belongs was  sent with patient. Wife was present at the bedside. Jacqueline Washington RN

## 2022-09-04 NOTE — PROGRESS NOTES
Care Management Follow Up    Length of Stay (days): 2    Expected Discharge Date: 09/07/2022    Concerns to be Addressed:   Care post CABG 9/2/2022 (see op note): IV Lasix every 12 hours.   Patient plan of care discussed at interdisciplinary rounds: Yes     Anticipated Discharge Disposition:  Home     Anticipated Discharge Services:  Outpatient cardiac rehab  Anticipated Discharge DME:  Per therapy (if indicated).     Patient/family educated on Medicare website which has current facility and service quality ratings:  NA  Education Provided on the Discharge Plan:  Per team  Patient/Family in Agreement with the Plan:  yes     Referrals Placed by CM/SW:  None  Private pay costs discussed: Not applicable     Additional Information:  Patient is  and largely independent with activities of daily living at baseline. Goal is home with wife Marylou's support. Cardiac rehab's current recommendation is outpatient cardiac rehab. However, CM will continue to monitor progression of care, review team recommendations and provide discharge planning assist as needed.      Aleksandra Medina RN

## 2022-09-04 NOTE — PROGRESS NOTES
Sitting in chair, room air SpO2 95 %, pt recently performed IS and flutter valve on her own, IS volume 750-900 ml. Flutter valve good technique. RT to follow

## 2022-09-05 ENCOUNTER — APPOINTMENT (OUTPATIENT)
Dept: OCCUPATIONAL THERAPY | Facility: HOSPITAL | Age: 72
DRG: 236 | End: 2022-09-05
Attending: THORACIC SURGERY (CARDIOTHORACIC VASCULAR SURGERY)
Payer: COMMERCIAL

## 2022-09-05 ENCOUNTER — APPOINTMENT (OUTPATIENT)
Dept: RADIOLOGY | Facility: HOSPITAL | Age: 72
DRG: 236 | End: 2022-09-05
Attending: PHYSICIAN ASSISTANT
Payer: COMMERCIAL

## 2022-09-05 LAB
ANION GAP SERPL CALCULATED.3IONS-SCNC: 8 MMOL/L (ref 5–18)
BUN SERPL-MCNC: 17 MG/DL (ref 8–28)
CALCIUM SERPL-MCNC: 8.3 MG/DL (ref 8.5–10.5)
CALCIUM, IONIZED MEASURED: 1.09 MMOL/L (ref 1.11–1.3)
CHLORIDE BLD-SCNC: 101 MMOL/L (ref 98–107)
CO2 SERPL-SCNC: 28 MMOL/L (ref 22–31)
CREAT SERPL-MCNC: 0.77 MG/DL (ref 0.7–1.3)
ERYTHROCYTE [DISTWIDTH] IN BLOOD BY AUTOMATED COUNT: 13.3 % (ref 10–15)
GFR SERPL CREATININE-BSD FRML MDRD: >90 ML/MIN/1.73M2
GLUCOSE BLD-MCNC: 208 MG/DL (ref 70–125)
GLUCOSE BLDC GLUCOMTR-MCNC: 139 MG/DL (ref 70–99)
GLUCOSE BLDC GLUCOMTR-MCNC: 145 MG/DL (ref 70–99)
GLUCOSE BLDC GLUCOMTR-MCNC: 158 MG/DL (ref 70–99)
GLUCOSE BLDC GLUCOMTR-MCNC: 200 MG/DL (ref 70–99)
GLUCOSE BLDC GLUCOMTR-MCNC: 225 MG/DL (ref 70–99)
HCT VFR BLD AUTO: 29.2 % (ref 40–53)
HGB BLD-MCNC: 9.9 G/DL (ref 13.3–17.7)
HOLD SPECIMEN: NORMAL
ION CA PH 7.4: 1.11 MMOL/L (ref 1.11–1.3)
LACTATE SERPL-SCNC: 1.8 MMOL/L (ref 0.7–2)
MAGNESIUM SERPL-MCNC: 1.7 MG/DL (ref 1.8–2.6)
MCH RBC QN AUTO: 31.7 PG (ref 26.5–33)
MCHC RBC AUTO-ENTMCNC: 33.9 G/DL (ref 31.5–36.5)
MCV RBC AUTO: 94 FL (ref 78–100)
PH: 7.44 (ref 7.35–7.45)
PHOSPHATE SERPL-MCNC: 1.9 MG/DL (ref 2.5–4.5)
PLATELET # BLD AUTO: 200 10E3/UL (ref 150–450)
POTASSIUM BLD-SCNC: 4.2 MMOL/L (ref 3.5–5)
RBC # BLD AUTO: 3.12 10E6/UL (ref 4.4–5.9)
SODIUM SERPL-SCNC: 137 MMOL/L (ref 136–145)
WBC # BLD AUTO: 10.5 10E3/UL (ref 4–11)

## 2022-09-05 PROCEDURE — 210N000001 HC R&B IMCU HEART CARE

## 2022-09-05 PROCEDURE — 80048 BASIC METABOLIC PNL TOTAL CA: CPT | Performed by: PHYSICIAN ASSISTANT

## 2022-09-05 PROCEDURE — 97110 THERAPEUTIC EXERCISES: CPT | Mod: GO

## 2022-09-05 PROCEDURE — 83735 ASSAY OF MAGNESIUM: CPT | Performed by: PHYSICIAN ASSISTANT

## 2022-09-05 PROCEDURE — 250N000011 HC RX IP 250 OP 636: Performed by: SURGERY

## 2022-09-05 PROCEDURE — 250N000011 HC RX IP 250 OP 636: Performed by: PHYSICIAN ASSISTANT

## 2022-09-05 PROCEDURE — 85027 COMPLETE CBC AUTOMATED: CPT | Performed by: PHYSICIAN ASSISTANT

## 2022-09-05 PROCEDURE — 36415 COLL VENOUS BLD VENIPUNCTURE: CPT | Performed by: PHYSICIAN ASSISTANT

## 2022-09-05 PROCEDURE — P9045 ALBUMIN (HUMAN), 5%, 250 ML: HCPCS | Performed by: THORACIC SURGERY (CARDIOTHORACIC VASCULAR SURGERY)

## 2022-09-05 PROCEDURE — 250N000013 HC RX MED GY IP 250 OP 250 PS 637: Performed by: PHYSICIAN ASSISTANT

## 2022-09-05 PROCEDURE — 999N000065 XR CHEST 2 VIEWS

## 2022-09-05 PROCEDURE — 999N000127 HC STATISTIC PERIPHERAL IV START W US GUIDANCE

## 2022-09-05 PROCEDURE — 250N000013 HC RX MED GY IP 250 OP 250 PS 637: Performed by: THORACIC SURGERY (CARDIOTHORACIC VASCULAR SURGERY)

## 2022-09-05 PROCEDURE — 82330 ASSAY OF CALCIUM: CPT | Performed by: PHYSICIAN ASSISTANT

## 2022-09-05 PROCEDURE — 999N000157 HC STATISTIC RCP TIME EA 10 MIN

## 2022-09-05 PROCEDURE — 250N000011 HC RX IP 250 OP 636: Performed by: THORACIC SURGERY (CARDIOTHORACIC VASCULAR SURGERY)

## 2022-09-05 PROCEDURE — 83605 ASSAY OF LACTIC ACID: CPT | Performed by: SURGERY

## 2022-09-05 PROCEDURE — 84100 ASSAY OF PHOSPHORUS: CPT | Performed by: THORACIC SURGERY (CARDIOTHORACIC VASCULAR SURGERY)

## 2022-09-05 PROCEDURE — 97535 SELF CARE MNGMENT TRAINING: CPT | Mod: GO

## 2022-09-05 PROCEDURE — 258N000003 HC RX IP 258 OP 636: Performed by: SURGERY

## 2022-09-05 RX ORDER — MAGNESIUM SULFATE HEPTAHYDRATE 40 MG/ML
2 INJECTION, SOLUTION INTRAVENOUS ONCE
Status: DISCONTINUED | OUTPATIENT
Start: 2022-09-05 | End: 2022-09-05

## 2022-09-05 RX ORDER — ASPIRIN 81 MG/1
162 TABLET, CHEWABLE ORAL DAILY
Qty: 60 TABLET | Refills: 3 | Status: CANCELLED | OUTPATIENT
Start: 2022-09-06

## 2022-09-05 RX ORDER — ACETAMINOPHEN 325 MG/1
650 TABLET ORAL EVERY 4 HOURS PRN
Qty: 30 TABLET | Refills: 0 | Status: CANCELLED | OUTPATIENT
Start: 2022-09-05

## 2022-09-05 RX ORDER — MAGNESIUM SULFATE HEPTAHYDRATE 40 MG/ML
2 INJECTION, SOLUTION INTRAVENOUS ONCE
Status: COMPLETED | OUTPATIENT
Start: 2022-09-05 | End: 2022-09-05

## 2022-09-05 RX ORDER — OXYCODONE HYDROCHLORIDE 5 MG/1
5 TABLET ORAL EVERY 4 HOURS PRN
Qty: 16 TABLET | Refills: 0 | Status: CANCELLED | OUTPATIENT
Start: 2022-09-05

## 2022-09-05 RX ORDER — METOPROLOL TARTRATE 25 MG/1
25 TABLET, FILM COATED ORAL 2 TIMES DAILY
Qty: 60 TABLET | Refills: 3 | Status: CANCELLED | OUTPATIENT
Start: 2022-09-05

## 2022-09-05 RX ORDER — CALCIUM GLUCONATE 20 MG/ML
1 INJECTION, SOLUTION INTRAVENOUS ONCE
Status: COMPLETED | OUTPATIENT
Start: 2022-09-05 | End: 2022-09-05

## 2022-09-05 RX ORDER — AMIODARONE HYDROCHLORIDE 200 MG/1
200 TABLET ORAL 2 TIMES DAILY
Qty: 60 TABLET | Refills: 0 | Status: CANCELLED | OUTPATIENT
Start: 2022-09-05 | End: 2022-10-05

## 2022-09-05 RX ORDER — PANTOPRAZOLE SODIUM 40 MG/1
40 TABLET, DELAYED RELEASE ORAL DAILY
Qty: 30 TABLET | Refills: 0 | Status: CANCELLED | OUTPATIENT
Start: 2022-09-06 | End: 2022-10-06

## 2022-09-05 RX ORDER — GLIPIZIDE 5 MG/1
5 TABLET, FILM COATED, EXTENDED RELEASE ORAL
Status: DISCONTINUED | OUTPATIENT
Start: 2022-09-05 | End: 2022-09-07 | Stop reason: HOSPADM

## 2022-09-05 RX ORDER — AMIODARONE HYDROCHLORIDE 200 MG/1
200 TABLET ORAL 2 TIMES DAILY
Status: DISCONTINUED | OUTPATIENT
Start: 2022-09-05 | End: 2022-09-07 | Stop reason: HOSPADM

## 2022-09-05 RX ADMIN — AMIODARONE HYDROCHLORIDE 200 MG: 200 TABLET ORAL at 11:50

## 2022-09-05 RX ADMIN — FINASTERIDE 5 MG: 5 TABLET, FILM COATED ORAL at 08:38

## 2022-09-05 RX ADMIN — SENNOSIDES AND DOCUSATE SODIUM 1 TABLET: 8.6; 5 TABLET ORAL at 21:53

## 2022-09-05 RX ADMIN — FUROSEMIDE 40 MG: 10 INJECTION, SOLUTION INTRAMUSCULAR; INTRAVENOUS at 11:50

## 2022-09-05 RX ADMIN — POLYETHYLENE GLYCOL 3350 17 G: 17 POWDER, FOR SOLUTION ORAL at 08:34

## 2022-09-05 RX ADMIN — FUROSEMIDE 40 MG: 10 INJECTION, SOLUTION INTRAMUSCULAR; INTRAVENOUS at 00:18

## 2022-09-05 RX ADMIN — ACETAMINOPHEN 975 MG: 325 TABLET, FILM COATED ORAL at 08:35

## 2022-09-05 RX ADMIN — ACETAMINOPHEN 975 MG: 325 TABLET, FILM COATED ORAL at 21:51

## 2022-09-05 RX ADMIN — POTASSIUM & SODIUM PHOSPHATES POWDER PACK 280-160-250 MG 1 PACKET: 280-160-250 PACK at 00:19

## 2022-09-05 RX ADMIN — HEPARIN SODIUM 5000 UNITS: 5000 INJECTION, SOLUTION INTRAVENOUS; SUBCUTANEOUS at 14:09

## 2022-09-05 RX ADMIN — POTASSIUM & SODIUM PHOSPHATES POWDER PACK 280-160-250 MG 1 PACKET: 280-160-250 PACK at 08:42

## 2022-09-05 RX ADMIN — POTASSIUM & SODIUM PHOSPHATES POWDER PACK 280-160-250 MG 1 PACKET: 280-160-250 PACK at 04:32

## 2022-09-05 RX ADMIN — MAGNESIUM SULFATE HEPTAHYDRATE 2 G: 2 INJECTION, SOLUTION INTRAVENOUS at 11:00

## 2022-09-05 RX ADMIN — GLIPIZIDE 5 MG: 5 TABLET, EXTENDED RELEASE ORAL at 08:35

## 2022-09-05 RX ADMIN — Medication 100 MCG: at 08:37

## 2022-09-05 RX ADMIN — METOPROLOL TARTRATE 25 MG: 25 TABLET, FILM COATED ORAL at 21:53

## 2022-09-05 RX ADMIN — HEPARIN SODIUM 5000 UNITS: 5000 INJECTION, SOLUTION INTRAVENOUS; SUBCUTANEOUS at 08:34

## 2022-09-05 RX ADMIN — ACETAMINOPHEN 975 MG: 325 TABLET, FILM COATED ORAL at 14:09

## 2022-09-05 RX ADMIN — AMIODARONE HYDROCHLORIDE 150 MG: 1.5 INJECTION, SOLUTION INTRAVENOUS at 02:11

## 2022-09-05 RX ADMIN — SENNOSIDES AND DOCUSATE SODIUM 1 TABLET: 8.6; 5 TABLET ORAL at 08:40

## 2022-09-05 RX ADMIN — CALCIUM GLUCONATE 1 G: 20 INJECTION, SOLUTION INTRAVENOUS at 11:01

## 2022-09-05 RX ADMIN — HEPARIN SODIUM 5000 UNITS: 5000 INJECTION, SOLUTION INTRAVENOUS; SUBCUTANEOUS at 21:52

## 2022-09-05 RX ADMIN — METFORMIN HYDROCHLORIDE 1000 MG: 500 TABLET ORAL at 08:39

## 2022-09-05 RX ADMIN — ATORVASTATIN CALCIUM 80 MG: 40 TABLET, FILM COATED ORAL at 08:37

## 2022-09-05 RX ADMIN — AMIODARONE HYDROCHLORIDE 200 MG: 200 TABLET ORAL at 21:51

## 2022-09-05 RX ADMIN — METOPROLOL TARTRATE 25 MG: 25 TABLET, FILM COATED ORAL at 08:39

## 2022-09-05 RX ADMIN — PANTOPRAZOLE SODIUM 40 MG: 40 TABLET, DELAYED RELEASE ORAL at 08:39

## 2022-09-05 RX ADMIN — AMIODARONE HYDROCHLORIDE 1 MG/MIN: 50 INJECTION, SOLUTION INTRAVENOUS at 02:28

## 2022-09-05 RX ADMIN — TAMSULOSIN HYDROCHLORIDE 0.4 MG: 0.4 CAPSULE ORAL at 21:54

## 2022-09-05 RX ADMIN — METFORMIN HYDROCHLORIDE 1000 MG: 500 TABLET ORAL at 18:30

## 2022-09-05 RX ADMIN — ALBUMIN (HUMAN) 12.5 G: 12.5 INJECTION, SOLUTION INTRAVENOUS at 17:03

## 2022-09-05 RX ADMIN — GLIPIZIDE 5 MG: 5 TABLET, EXTENDED RELEASE ORAL at 18:22

## 2022-09-05 RX ADMIN — FLUOXETINE 60 MG: 20 CAPSULE ORAL at 08:37

## 2022-09-05 RX ADMIN — ASPIRIN 162 MG: 81 TABLET, CHEWABLE ORAL at 08:38

## 2022-09-05 ASSESSMENT — ACTIVITIES OF DAILY LIVING (ADL)
ADLS_ACUITY_SCORE: 29

## 2022-09-05 NOTE — PLAN OF CARE
Problem: Risk for Delirium  Goal: Improved Attention and Thought Clarity  Outcome: Ongoing, Progressing     Problem: Activity Intolerance (Cardiovascular Surgery)  Goal: Improved Activity Tolerance  Outcome: Ongoing, Progressing     Problem: Adjustment to Surgery (Cardiovascular Surgery)  Goal: Optimal Coping with Heart Surgery  Outcome: Ongoing, Progressing     Problem: Bowel Motility Impaired (Cardiovascular Surgery)  Goal: Effective Bowel Elimination (Cardiovascular Surgery)  Outcome: Ongoing, Progressing     Problem: Cerebral Tissue Perfusion (Cardiovascular Surgery)  Goal: Optimal Cerebral Tissue Perfusion (Cardiovascular Surgery)  Intervention: Protect and Optimize Cerebral Perfusion  Recent Flowsheet Documentation  Taken 9/4/2022 2106 by Sherrie Morton RN  Head of Bed (HOB) Positioning: HOB at 20-30 degrees  Taken 9/4/2022 1737 by Sherrie Morton RN  Head of Bed (HOB) Positioning: HOB at 20-30 degrees     Problem: Pain (Cardiovascular Surgery)  Goal: Acceptable Pain Control  Outcome: Ongoing, Progressing  Intervention: Prevent or Manage Pain  Recent Flowsheet Documentation  Taken 9/4/2022 2104 by Sherrie Morton RN  Pain Management Interventions: medication (see MAR)     Problem: Postoperative Urinary Retention (Cardiovascular Surgery)  Goal: Effective Urinary Elimination (Cardiovascular Surgery)  Outcome: Ongoing, Progressing   Goal Outcome Evaluation:          ..sc  Patient Name: Bala Hopper   MRN: [id number]   Date of Admission: 9/2/2022    Procedure: Procedure(s):  CORONARY ARTERY BYPASS GRAFT (CABG)X2 LEFT LEG ENDOSCOPIC SAPHENOUS VESSEL PROCUREMENT, LEFT INTERNAL MAMMARY ARTERY HARVEST, ANESTHESIA TRANSESOPHAGEAL ECHOCARDIOGRAM;EPI AORTIC ULTRASOUND    Post Op day #: 2    Subjective (Patient focus/Primary Problem for shift):  Bowel movement          Pain Goal 0/10  Pain Rating 2-4/10 mid chest incision           Pain Medication/ Regime effective to reduce patient pain on scheduled  Acetaminophen    Objective (Physical assessment):           Rhythm: normal sinus rhythm            Bowel Activity: no if Yes indicate when:  n/a          Bowel Medications: yes            Incision: healing well          Incentive Spirometry Q 1-2 hour when awake:  yes Volume:  1000          Epicardial Pacing Wires:  no            Patient Activity:           Up to chair for meals: yes          Ambulation with RN x2 (Not including CR): no            Is patient in home clothes:no             Chest Tubes   Pleural: no Draining: no               Suction: no              Mediastinal: no Draining: no               Suction: no   Dressing Change Daily:yes If No, why? N/a                     Urinary Catheter: no           Preventative WOC consult (need MD order): no       Assessment (Nursing primary shift focus):     Alert. Oriented. Forgetful. Slow thought process.     On scheduled Aceminophen 975 mg po, helping with mild chest incision pain.    Denied shortness of breath out of his usual. In room air, mid 90s O2 sat. Lung sounds clear diminished.    CV Sx on call updated re patient's calcitonin level, ordered to recheck Lactic acid tomorrow morning. MD was also updated this afternoon, re patient's incident of feeling lightheaded after walking in the hallway and resolved after laying in bed on day shift.    Patient had another episode when he was walking in the hallway tonight, he got lightheaded, sweaty and little confused. Symptoms resolved after he sat on the chair, his BP was 126/62 by then. His BP was in 115/58 sitting before walking. CV Sx on call, was updated again re his symptoms. No further walking tonight, bedside commode for toileting.     He is on scheduled senna-docusate. No BM since admission. Passing flatus. Prn MOM given tonight. Prune juice given at bedtime.     Patient had the urge to void but unable. Initial bladder scan 281 ml, rechecked 2 hrs after 358 ml. Straight cathed, 350 ml out.    Little weak and  little unsteady. Contact guard to assist of 1. Gait belt. Falls precaution and interventions maintained.    Post heart surgery teachings started with patient and wife at bedside this afternoon.     Plan (Patient Care Plan/focus):     Bowel movement.  Patient able to walk in the hallway without dropping his BP.  Continue using incentive spirometer and flutter valve.  Continue Sternal precaution.   Sternum and Incisions to continue healing well.      Sherrie Morton RN   9/4/2022   10:33 PM

## 2022-09-05 NOTE — PROGRESS NOTES
CVTS Daily Progress Note   POD # 3 S/P CABG X 2  Attending: Dr. Carline Bradshaw MD  LOS: 3    SUBJECTIVE/INTERVAL EVENTS:  No acute events overnight. Patient progressing well. States pain is well managed on current regimen. Slept well overnight though with some confusion reported by patient is clear headed this morning.  Maintaining oxygen saturations on  room air. Normotensive and normal sinus rhythm per monitor.   Ambulating with therapy and tolerating well. + Bowel sounds but no BM yet. Tolerating diet. UOP adequate. Chest tube and pacer wires removed 09/04/2022.   Patient denies new chest pain, shortness of breath, abdominal pain, calf pain, nausea. Patient has no questions today.    OBJECTIVE:  Temp:  [97.7  F (36.5  C)-99.3  F (37.4  C)] 98.8  F (37.1  C)  Pulse:  [70-97] 70  Resp:  [18-22] 18  BP: (102-176)/(55-91) 108/59  SpO2:  [91 %-97 %] 91 %  Resp: 18    Vitals:    09/02/22 0530 09/03/22 0545 09/04/22 0600 09/05/22 0548   Weight: 91.9 kg (202 lb 8 oz) 94.5 kg (208 lb 4.8 oz) 94.9 kg (209 lb 3.2 oz) 93.8 kg (206 lb 12.8 oz)       Current Medications:    Scheduled Meds:    acetaminophen  975 mg Oral Q8H     aspirin  162 mg Oral or NG Tube Daily     atorvastatin  80 mg Oral Daily     calcium gluconate  1 g Intravenous Once     calcium gluconate  1 g Intravenous Once     finasteride  5 mg Oral Daily     FLUoxetine  60 mg Oral Daily     furosemide  40 mg Intravenous Q12H     glipiZIDE  5 mg Oral BID AC     heparin ANTICOAGULANT  5,000 Units Subcutaneous Q8H     insulin aspart  1-7 Units Subcutaneous TID AC     insulin aspart  1-5 Units Subcutaneous At Bedtime     magnesium sulfate  2 g Intravenous Once     metFORMIN  1,000 mg Oral BID w/meals     metoprolol tartrate  25 mg Oral BID     pantoprazole  40 mg Oral Daily     polyethylene glycol  17 g Oral Daily     senna-docusate  1 tablet Oral BID     sodium chloride (PF)  3 mL Intracatheter Q8H     tamsulosin  0.4 mg Oral QPM     vitamin D3  100 mcg Oral Daily      Continuous Infusions:    amiodarone 0.5 mg/min (09/05/22 0836)     phenylephrine Stopped (09/03/22 1123)     PRN Meds:.acetaminophen, bisacodyl, calcium gluconate, calcium gluconate, calcium gluconate, glucose **OR** dextrose **OR** glucagon, hydrALAZINE, lactated ringers, lidocaine 4%, lidocaine (buffered or not buffered), magnesium hydroxide, naloxone **OR** naloxone **OR** naloxone **OR** naloxone, ondansetron **OR** ondansetron, oxyCODONE **OR** oxyCODONE, phenylephrine, prochlorperazine **OR** prochlorperazine, sodium chloride (PF)    Cardiographics:    Telemetry monitoring demonstrates A. fib with RVR, now NSR with rates in the 60s per my personal review.    Imaging:  All available imaging reviewed, no acute concerns    Labs, personally reviewed.  BMP  Recent Labs   Lab 09/05/22  0756 09/05/22 0438 09/04/22  2114 09/04/22  1716 09/04/22  0739 09/04/22  0525 09/03/22  0622 09/03/22  0404 09/02/22  1207 09/02/22  1200   NA  --  137  --   --   --   --   --  138  --  139   POTASSIUM  --  4.2  --   --   --  4.0  --  4.2  --  3.5  3.5   CHLORIDE  --  101  --   --   --   --   --  109*  --  109*   EVELYNE  --  8.3*  --   --   --   --   --  8.3*  --  8.6   CO2  --  28  --   --   --   --   --  22  --  22   BUN  --  17  --   --   --   --   --  18  --  16   CR  --  0.77  --   --   --   --   --  0.78  --  0.85   * 208* 139* 196*   < >  --    < > 122   < > 197*    < > = values in this interval not displayed.     CBC  Recent Labs   Lab 09/05/22  0438 09/03/22  0404 09/02/22  1200 09/02/22  1113   WBC 10.5 13.8* 15.4* 24.2*   RBC 3.12* 3.42* 3.35* 3.11*   HGB 9.9* 10.8* 10.6* 9.8*   HCT 29.2* 32.2* 31.2* 29.5*   MCV 94 94 93 95   MCH 31.7 31.6 31.6 31.5   MCHC 33.9 33.5 34.0 33.2   RDW 13.3 13.6 13.2 13.2    298 209 257     INR  Recent Labs   Lab 09/02/22  1200 09/02/22  1113   INR 1.37* 1.43*      Hepatic Panel  Recent Labs   Lab 09/03/22  0404 09/02/22  1200   AST 27 17   ALT 15 15   ALKPHOS 54 51    BILITOTAL 0.8 0.5   ALBUMIN 3.2* 2.6*       Magnesium   Date Value Ref Range Status   09/05/2022 1.7 (L) 1.8 - 2.6 mg/dL Final   09/04/2022 1.8 1.8 - 2.6 mg/dL Final   09/03/2022 2.1 1.8 - 2.6 mg/dL Final        I/O:  I/O last 3 completed shifts:  In: 740 [P.O.:640; I.V.:100]  Out: 2616 [Urine:2521; Chest Tube:95]       Blood pressure 108/59, pulse 70, temperature 98.8  F (37.1  C), temperature source Oral, resp. rate 18, weight 93.8 kg (206 lb 12.8 oz), SpO2 91 %.  Vitals:    09/03/22 0545 09/04/22 0600 09/05/22 0548   Weight: 94.5 kg (208 lb 4.8 oz) 94.9 kg (209 lb 3.2 oz) 93.8 kg (206 lb 12.8 oz)        Physical Exam:  Weight 93.8 kg from 94.9 kg yesterday, preop weight 91.9 kg   24 hr Fluid status; net loss -1876 mL.  mL  MAPs: 90    Gen: A&Ox4, bed to chair, with NAD  Neuro: no focal deficits   CV: RRR, normal S1 S2, no murmurs, rubs or gallops.  No JVD  Pulm: CTA, no wheezing or rhonchi, normal breathing on RA  Abd: nondistended, normal BS, soft, nontender  Ext: No lower extremity edema appreciated  Incision: clean, dry, intact, no erythema, sternum stable  Tubes/drain sites: dressing clean and dry.     ASSESSMENT/PLAN:  Bala Hopper is a 71 year old male with a history of BPH, depression, diabetes mellitus, hyperlipidemia and obstructive sleep apnea who is POD # 3 S/P CABG X 2.  Awake and alert, out of bed to chair, with no apparent distress  AVSS, postop A. fib with RVR now NSR per monitor  Denies incisional chest pain and no shortness of breath  Postop respiratory insufficiency resolved, room air saturation 97%  Adequate inspiratory effort, I-S 1000 cc  Diabetes mellitus currently on AC & HS SSII  Incisions are clean dry and intact, lungs sound diminished to bases  Chest tube removed 09/04/2022  Weight 93.8 kg from 94.9 kg yesterday, preop weight 91.9 kg  Lasix 40 mg IV twice daily metoprolol 25 mg twice a day  Continue pulmonary toilet and mobilize ambulate in the hallway 3-4 times  daily  Dissipate discharge to home in the a.m.       NEURO:   - Scheduled Tylenol and PRN oxycodone for pain  - Melatonin QHS    CV:   - Normotensive  - Metoprolol  25 mg two times a day starting tonight  - ASA 162mg  - Atorvastatin 80 mg daily  - Chest tubes and pacer wires removed 09/04/2022    PULM:   - Maintaining oxygen saturations on  room air  - Encourage pulmonary toilet    FEN/GI:  - Continue electrolyte replacement protocol  - Diet: Cardiac, ADAT   - Bowel regimen    RENAL:  - Adequate UOP/hr. Continue to monitor closely via napoles.   - Cr 0.78  - Napoles catheter removed 09/04/2022.   - Diuresis with Lasix 40mg IV  two times a day    HEME:  - Acute blood loss anemia post-op.   - Hgb stable, no bleeding concerns. Hep SQ, [id number]    ID:  - Jessy op ppx complete, afebrile . No concerns for infection    ENDO:   - Transition to SSI     PPx:   - DVT: SCDs, SQ heparin TID, ambulation   - GI: Protonix 40mg PO daily    DISPO:   -Transfer to telemetry floor  - Anticipate discharge to home in the a.m.        Patient discussed with Dr. Aleksandra Bradshaw MD.     Sridhar Bro PA-C  Cardiothoracic Surgery  Pager [phone]    10:01 AM   September 5, 2022

## 2022-09-05 NOTE — PROGRESS NOTES
Pt. HR went into atrial fib at 00:47. Rate 135-140 for about 50 minutes, then down to 110 range. Notified Dr. Bryant, on call for CT surgery, and received orders for amiodarone bolus and drip. Pt. Also still retaining urine. Voided 200ml and bladder scanned after for 640ml, then he voided another 200ml before straight cathed for 550ml return. Heart rate is decreased now to 80's to 90's but still in A.fib.

## 2022-09-05 NOTE — PLAN OF CARE
Goal Outcome Evaluation:    Plan of Care Reviewed With: patient     Overall Patient Progress: no change     sc  Patient Name: Bala Hopper   MRN: [id number]   Date of Admission: 9/2/2022    Procedure: Procedure(s):  CORONARY ARTERY BYPASS GRAFT (CABG)X2 LEFT LEG ENDOSCOPIC SAPHENOUS VESSEL PROCUREMENT, LEFT INTERNAL MAMMARY ARTERY HARVEST, ANESTHESIA TRANSESOPHAGEAL ECHOCARDIOGRAM;EPI AORTIC ULTRASOUND    Post Op day #:3      Subjective (Patient focus/Primary Problem for shift): heart rhythm, voiding issues, no BM            Pain Goal0   Pain Rating0             Pain Medication/ Regime effective to reduce patient painyes      Objective (Physical assessment):           Rhythm: normal sinus rhythm and was atrial fib with RVR from 5461-3604, a fib w/ CVR until 0336, then back into NSR            Bowel Activity: no if Yes indicate when: n/a            Bowel Medications: yes, at bedtime              Incision: healing well          Incentive Spirometry Q 1-2 hour when awake:  yes Volume: not done while sleeping            Epicardial Pacing Wires:  no            Patient Activity:           Up to chair for meals: yes          Ambulation with RN x2 (Not including CR): no, due to dizziness, afib              Is patient in home clothes:no             Chest Tubes   Pleural: no Draining: no               Suction: no              Mediastinal: no Draining: no               Suction: no   Dressing Change Daily:no If No, why?will have shower today and no dressings                       Urinary Catheter: no           Preventative WOC consult (need MD order): no       Assessment (Nursing primary shift focus): Straight catheterization for bladder scan  > 650ml      Plan (Patient Care Plan/focus): Continue to maintain NSR, be able to ambulate w/o dizziness, be able to void w/o retention, and be able to have BM.       Tessie Vasquez RN   9/5/2022   7:57 AM

## 2022-09-05 NOTE — PROGRESS NOTES
Care Management Follow Up    Length of Stay (days): 3    Expected Discharge Date: 09/07/2022    Concerns to be Addressed:   Care post CABG 9/2/2022 (see op note): IV Lasix every 12 hours. A fib with RVR noted, Amiodarone drip initiated overnight.   Patient plan of care discussed at interdisciplinary rounds: Yes     Anticipated Discharge Disposition:  Home     Anticipated Discharge Services:  Outpatient cardiac rehab  Anticipated Discharge DME:  Per therapy (if indicated).     Patient/family educated on Medicare website which has current facility and service quality ratings:  NA  Education Provided on the Discharge Plan:  Per team  Patient/Family in Agreement with the Plan:  yes     Referrals Placed by CM/SW:  None  Private pay costs discussed: Not applicable     Additional Information:  Patient is  and largely independent with activities of daily living at baseline. Goal is home with wife Marylou's support. Cardiac rehab's current recommendation is outpatient cardiac rehab. However, CM will continue to monitor progression of care, review team recommendations and provide discharge planning assist as needed.      Aleksandra Medina RN

## 2022-09-05 NOTE — PROGRESS NOTES
..sc  Patient Name: Bala Hopper   MRN: [id number]   Date of Admission: 9/2/2022    Procedure: Procedure(s):  CORONARY ARTERY BYPASS GRAFT (CABG)X2 LEFT LEG ENDOSCOPIC SAPHENOUS VESSEL PROCUREMENT, LEFT INTERNAL MAMMARY ARTERY HARVEST, ANESTHESIA TRANSESOPHAGEAL ECHOCARDIOGRAM;EPI AORTIC ULTRASOUND    Post Op day #:3    Subjective (Patient focus/Primary Problem for shift): call light use, following sternum precautions          Pain Goal0 Pain Rating0           Pain Medication/ Regime effective to reduce patient pain schedule Tylenol    Objective (Physical assessment):           Rhythm: normal sinus rhythm            Bowel Activity: no if Yes indicate when: n/a          Bowel Medications: yes            Incision: healing well          Incentive Spirometry Q 1-2 hour when awake:  yes Volume: 1200          Epicardial Pacing Wires:  not applicable            Patient Activity:           Up to chair for meals: yes          Ambulation with RN x2 (Not including CR): yes            Is patient in home clothes:not applicable             Chest Tubes   Pleural: not applicable Draining: not applicable               Suction: not applicable              Mediastinal: not applicable Draining: no               Suction: not applicable   Dressing Change Daily:yes If No, why?open to air                     Urinary Catheter: not applicable           Preventative WOC consult (need MD order): no       Assessment (Nursing primary shift focus): Patient up in chair for meals, tolerating well. Family reported one episode of vomiting after lunch, patient denies nausea. Patient needs occasional reminders to follow sternum precautions and frequent reminders to use call light. Bed and chair  Alarms in place. Patient had one episode of near syncope on the way to the bathroom, CV surgery updated. Please see new orders.    Plan (Patient Care Plan/focus): continue to monitor      Mellissa English RN   9/5/2022   3:59 PM

## 2022-09-06 ENCOUNTER — APPOINTMENT (OUTPATIENT)
Dept: OCCUPATIONAL THERAPY | Facility: HOSPITAL | Age: 72
DRG: 236 | End: 2022-09-06
Attending: THORACIC SURGERY (CARDIOTHORACIC VASCULAR SURGERY)
Payer: COMMERCIAL

## 2022-09-06 DIAGNOSIS — Z95.1 S/P CABG (CORONARY ARTERY BYPASS GRAFT): Primary | ICD-10-CM

## 2022-09-06 LAB
CALCIUM, IONIZED MEASURED: 1.08 MMOL/L (ref 1.11–1.3)
GLUCOSE BLDC GLUCOMTR-MCNC: 140 MG/DL (ref 70–99)
GLUCOSE BLDC GLUCOMTR-MCNC: 144 MG/DL (ref 70–99)
GLUCOSE BLDC GLUCOMTR-MCNC: 178 MG/DL (ref 70–99)
ION CA PH 7.4: 1.13 MMOL/L (ref 1.11–1.3)
MAGNESIUM SERPL-MCNC: 2 MG/DL (ref 1.8–2.6)
PH: 7.49 (ref 7.35–7.45)
PHOSPHATE SERPL-MCNC: 2 MG/DL (ref 2.5–4.5)
PLATELET # BLD AUTO: 256 10E3/UL (ref 150–450)
POTASSIUM BLD-SCNC: 3.7 MMOL/L (ref 3.5–5)

## 2022-09-06 PROCEDURE — 84100 ASSAY OF PHOSPHORUS: CPT | Performed by: THORACIC SURGERY (CARDIOTHORACIC VASCULAR SURGERY)

## 2022-09-06 PROCEDURE — 210N000001 HC R&B IMCU HEART CARE

## 2022-09-06 PROCEDURE — 83735 ASSAY OF MAGNESIUM: CPT | Performed by: THORACIC SURGERY (CARDIOTHORACIC VASCULAR SURGERY)

## 2022-09-06 PROCEDURE — 250N000013 HC RX MED GY IP 250 OP 250 PS 637: Performed by: PHYSICIAN ASSISTANT

## 2022-09-06 PROCEDURE — 97110 THERAPEUTIC EXERCISES: CPT | Mod: GO

## 2022-09-06 PROCEDURE — 97535 SELF CARE MNGMENT TRAINING: CPT | Mod: GO

## 2022-09-06 PROCEDURE — 250N000011 HC RX IP 250 OP 636: Performed by: PHYSICIAN ASSISTANT

## 2022-09-06 PROCEDURE — 84132 ASSAY OF SERUM POTASSIUM: CPT | Performed by: THORACIC SURGERY (CARDIOTHORACIC VASCULAR SURGERY)

## 2022-09-06 PROCEDURE — 36415 COLL VENOUS BLD VENIPUNCTURE: CPT | Performed by: PHYSICIAN ASSISTANT

## 2022-09-06 PROCEDURE — 250N000013 HC RX MED GY IP 250 OP 250 PS 637: Performed by: THORACIC SURGERY (CARDIOTHORACIC VASCULAR SURGERY)

## 2022-09-06 PROCEDURE — 85049 AUTOMATED PLATELET COUNT: CPT | Performed by: PHYSICIAN ASSISTANT

## 2022-09-06 PROCEDURE — 36415 COLL VENOUS BLD VENIPUNCTURE: CPT | Performed by: THORACIC SURGERY (CARDIOTHORACIC VASCULAR SURGERY)

## 2022-09-06 PROCEDURE — 82330 ASSAY OF CALCIUM: CPT | Performed by: PHYSICIAN ASSISTANT

## 2022-09-06 RX ORDER — MAGNESIUM OXIDE 400 MG/1
400 TABLET ORAL EVERY 4 HOURS
Status: COMPLETED | OUTPATIENT
Start: 2022-09-06 | End: 2022-09-06

## 2022-09-06 RX ORDER — POTASSIUM CHLORIDE 750 MG/1
10 TABLET, EXTENDED RELEASE ORAL DAILY
Status: DISCONTINUED | OUTPATIENT
Start: 2022-09-06 | End: 2022-09-07 | Stop reason: HOSPADM

## 2022-09-06 RX ORDER — ASPIRIN 81 MG/1
162 TABLET, CHEWABLE ORAL DAILY
Qty: 60 TABLET | Refills: 3 | Status: SHIPPED | OUTPATIENT
Start: 2022-09-07

## 2022-09-06 RX ORDER — POTASSIUM CHLORIDE 750 MG/1
10 TABLET, EXTENDED RELEASE ORAL DAILY
Qty: 7 TABLET | Refills: 0 | Status: ON HOLD | OUTPATIENT
Start: 2022-09-06 | End: 2022-09-15

## 2022-09-06 RX ORDER — PANTOPRAZOLE SODIUM 40 MG/1
40 TABLET, DELAYED RELEASE ORAL DAILY
Qty: 30 TABLET | Refills: 0 | Status: SHIPPED | OUTPATIENT
Start: 2022-09-07 | End: 2022-09-07

## 2022-09-06 RX ORDER — FUROSEMIDE 20 MG
20 TABLET ORAL DAILY
Qty: 7 TABLET | Refills: 0 | Status: ON HOLD | OUTPATIENT
Start: 2022-09-06 | End: 2022-09-15

## 2022-09-06 RX ORDER — AMIODARONE HYDROCHLORIDE 200 MG/1
200 TABLET ORAL 2 TIMES DAILY
Qty: 60 TABLET | Refills: 0 | Status: SHIPPED | OUTPATIENT
Start: 2022-09-06 | End: 2022-10-26 | Stop reason: ALTCHOICE

## 2022-09-06 RX ORDER — METOPROLOL TARTRATE 25 MG/1
25 TABLET, FILM COATED ORAL 2 TIMES DAILY
Qty: 60 TABLET | Refills: 3 | Status: SHIPPED | OUTPATIENT
Start: 2022-09-06 | End: 2022-09-07

## 2022-09-06 RX ORDER — OXYCODONE HYDROCHLORIDE 5 MG/1
5 TABLET ORAL EVERY 4 HOURS PRN
Qty: 16 TABLET | Refills: 0 | Status: ON HOLD | OUTPATIENT
Start: 2022-09-06 | End: 2022-09-15

## 2022-09-06 RX ORDER — FUROSEMIDE 20 MG
20 TABLET ORAL DAILY
Status: DISCONTINUED | OUTPATIENT
Start: 2022-09-06 | End: 2022-09-07 | Stop reason: HOSPADM

## 2022-09-06 RX ORDER — ACETAMINOPHEN 325 MG/1
650 TABLET ORAL EVERY 4 HOURS PRN
Qty: 30 TABLET | Refills: 0 | Status: SHIPPED | OUTPATIENT
Start: 2022-09-06 | End: 2024-06-20

## 2022-09-06 RX ORDER — POTASSIUM CHLORIDE 1500 MG/1
20 TABLET, EXTENDED RELEASE ORAL ONCE
Status: COMPLETED | OUTPATIENT
Start: 2022-09-06 | End: 2022-09-06

## 2022-09-06 RX ADMIN — MAGNESIUM OXIDE TAB 400 MG (241.3 MG ELEMENTAL MG) 400 MG: 400 (241.3 MG) TAB at 10:46

## 2022-09-06 RX ADMIN — PANTOPRAZOLE SODIUM 40 MG: 40 TABLET, DELAYED RELEASE ORAL at 08:06

## 2022-09-06 RX ADMIN — ACETAMINOPHEN 975 MG: 325 TABLET, FILM COATED ORAL at 12:12

## 2022-09-06 RX ADMIN — FINASTERIDE 5 MG: 5 TABLET, FILM COATED ORAL at 08:02

## 2022-09-06 RX ADMIN — ASPIRIN 162 MG: 81 TABLET, CHEWABLE ORAL at 08:10

## 2022-09-06 RX ADMIN — MAGNESIUM OXIDE TAB 400 MG (241.3 MG ELEMENTAL MG) 400 MG: 400 (241.3 MG) TAB at 07:00

## 2022-09-06 RX ADMIN — SENNOSIDES AND DOCUSATE SODIUM 1 TABLET: 8.6; 5 TABLET ORAL at 21:05

## 2022-09-06 RX ADMIN — GLIPIZIDE 5 MG: 5 TABLET, EXTENDED RELEASE ORAL at 08:10

## 2022-09-06 RX ADMIN — Medication 100 MCG: at 08:02

## 2022-09-06 RX ADMIN — POTASSIUM CHLORIDE 10 MEQ: 750 TABLET, EXTENDED RELEASE ORAL at 10:46

## 2022-09-06 RX ADMIN — TAMSULOSIN HYDROCHLORIDE 0.4 MG: 0.4 CAPSULE ORAL at 21:05

## 2022-09-06 RX ADMIN — METFORMIN HYDROCHLORIDE 1000 MG: 500 TABLET ORAL at 08:04

## 2022-09-06 RX ADMIN — AMIODARONE HYDROCHLORIDE 200 MG: 200 TABLET ORAL at 21:05

## 2022-09-06 RX ADMIN — AMIODARONE HYDROCHLORIDE 200 MG: 200 TABLET ORAL at 08:06

## 2022-09-06 RX ADMIN — METFORMIN HYDROCHLORIDE 1000 MG: 500 TABLET ORAL at 16:22

## 2022-09-06 RX ADMIN — ATORVASTATIN CALCIUM 80 MG: 40 TABLET, FILM COATED ORAL at 08:01

## 2022-09-06 RX ADMIN — SENNOSIDES AND DOCUSATE SODIUM 1 TABLET: 8.6; 5 TABLET ORAL at 08:06

## 2022-09-06 RX ADMIN — POTASSIUM CHLORIDE 20 MEQ: 1500 TABLET, EXTENDED RELEASE ORAL at 08:06

## 2022-09-06 RX ADMIN — POTASSIUM & SODIUM PHOSPHATES POWDER PACK 280-160-250 MG 1 PACKET: 280-160-250 PACK at 06:57

## 2022-09-06 RX ADMIN — POTASSIUM & SODIUM PHOSPHATES POWDER PACK 280-160-250 MG 1 PACKET: 280-160-250 PACK at 12:13

## 2022-09-06 RX ADMIN — ACETAMINOPHEN 975 MG: 325 TABLET, FILM COATED ORAL at 21:10

## 2022-09-06 RX ADMIN — HEPARIN SODIUM 5000 UNITS: 5000 INJECTION, SOLUTION INTRAVENOUS; SUBCUTANEOUS at 06:57

## 2022-09-06 RX ADMIN — GLIPIZIDE 5 MG: 5 TABLET, EXTENDED RELEASE ORAL at 16:21

## 2022-09-06 RX ADMIN — HEPARIN SODIUM 5000 UNITS: 5000 INJECTION, SOLUTION INTRAVENOUS; SUBCUTANEOUS at 21:05

## 2022-09-06 RX ADMIN — HEPARIN SODIUM 5000 UNITS: 5000 INJECTION, SOLUTION INTRAVENOUS; SUBCUTANEOUS at 12:13

## 2022-09-06 RX ADMIN — FUROSEMIDE 20 MG: 20 TABLET ORAL at 10:46

## 2022-09-06 RX ADMIN — FLUOXETINE 60 MG: 20 CAPSULE ORAL at 08:01

## 2022-09-06 RX ADMIN — METOPROLOL TARTRATE 25 MG: 25 TABLET, FILM COATED ORAL at 21:05

## 2022-09-06 RX ADMIN — CALCIUM GLUCONATE 1 G: 20 INJECTION, SOLUTION INTRAVENOUS at 10:10

## 2022-09-06 RX ADMIN — ACETAMINOPHEN 975 MG: 325 TABLET, FILM COATED ORAL at 06:57

## 2022-09-06 RX ADMIN — POTASSIUM & SODIUM PHOSPHATES POWDER PACK 280-160-250 MG 1 PACKET: 280-160-250 PACK at 03:40

## 2022-09-06 RX ADMIN — METOPROLOL TARTRATE 25 MG: 25 TABLET, FILM COATED ORAL at 08:06

## 2022-09-06 ASSESSMENT — ACTIVITIES OF DAILY LIVING (ADL)
ADLS_ACUITY_SCORE: 24
ADLS_ACUITY_SCORE: 28
ADLS_ACUITY_SCORE: 28
ADLS_ACUITY_SCORE: 24
ADLS_ACUITY_SCORE: 24
ADLS_ACUITY_SCORE: 28
ADLS_ACUITY_SCORE: 28
ADLS_ACUITY_SCORE: 29
ADLS_ACUITY_SCORE: 28
ADLS_ACUITY_SCORE: 32

## 2022-09-06 NOTE — PROGRESS NOTES
..sc  Patient Name: Bala Hopper   MRN: [id number]   Date of Admission: 9/2/2022    Procedure: Procedure(s):  CORONARY ARTERY BYPASS GRAFT (CABG)X2 LEFT LEG ENDOSCOPIC SAPHENOUS VESSEL PROCUREMENT, LEFT INTERNAL MAMMARY ARTERY HARVEST, ANESTHESIA TRANSESOPHAGEAL ECHOCARDIOGRAM;EPI AORTIC ULTRASOUND    Post Op day #:4    Subjective (Patient focus/Primary Problem for shift): Activity tolerance IS use           Pain Goal0 Pain Rating0           Pain Medication/ Regime effective to reduce patient pain Scheduled Tylenol     Objective (Physical assessment):           Rhythm: normal sinus rhythm            Bowel Activity: yes if Yes indicate when: today          Bowel Medications: yes            Incision: healing well          Incentive Spirometry Q 1-2 hour when awake:  yes Volume: 1000          Epicardial Pacing Wires:  not applicable            Patient Activity:           Up to chair for meals: yes          Ambulation with RN x2 (Not including CR): yes            Is patient in home clothes:no             Chest Tubes   Pleural: not applicable Draining: not applicable               Suction: not applicable              Mediastinal: not applicable Draining: not applicable               Suction: not applicable   Dressing Change Daily:not applicable If No, why?Done                     Urinary Catheter: not applicable           Preventative WOC consult (need MD order): not applicable       Assessment (Nursing primary shift focus): Walked in the hallway and tolerated the activity well .denied any pain or any discomfort     Plan (Patient Care Plan/focus): walk patient in the hallway continue to encourage IS use       Savita Rubio RN   9/6/2022   5:11 PM

## 2022-09-06 NOTE — CONSULTS
DIABETES CARE    Situation:  Consulted by Provider for Diabetes Education: pt has been eating a high sugar diet.  71 year old male with type 2 Diabetes. Patient was admitted for CABG on 9/2/22.     Background:  Related Co-morbidities include: BPH, HLD, DANIELLE  PCP: Misha Dawson  Social: lives at home with wife Marylou    Diabetes History:   Patient's highest A1c was around 10%. Has been on Soliqua for >1 year. Patient with DM education experience. Patient knowledgeable about diet aspect of DM management, though does not always follow. Now is going to try and incorporate sodium and fat restrictions as well. Patient does not experience low BG. Checks BG daily. Fasting is typically upper 70's to 140 mg/dL (averages ~100 mg/dL).     Meds for BG Management PTA:  5 mg BID Glipizide  1000 mg BID Metformin  25 units Soliqua -- Lantus+GLP-1 RA (0-25 units depending on BG reading) -- most recently 12 units    Current Inpatient Meds for BG Management:  5 mg BID Glipizide  1000 mg BID Metformin  1 drops 50 medium resistance correction scale >140 mg/dL    Labs:  Hemoglobin A1C: 6.1% 8/26/22  Hgb: 9.9 g/dL  SCr: 0.77 mg/dL   GFR: >90 mL/min/1.73m^2    Blood Glucose POC:    09/05/22 04:38 09/05/22 07:56 09/05/22 11:26 09/05/22 13:54 09/05/22 17:31 09/05/22 20:59   Glucose 208 (H)        GLUCOSE BY METER POCT  200 (H) 225 (H) 139 (H) 158 (H) 145 (H)     Diet Order: 60 grams CHO, 2 grams sodium, low saturated fat  Intake: 50-75%, good   Weight: 94 kg    BMI: 26.6 kg/m^2    DM EDUCATION/COUNSELING:  Barriers to Learning and/or DM Self-Management: None known  Previous DM Education: Yes  Current education and/or visit with patient and wife:  Educated/reviewed hypoglycemia: sx, causes, treatment.  Explained normal/goals of blood sugar control, A1C.  Educated on normal pancreas function. Specific medications were explained including how they each acted on blood sugar.  Carbohydrates were discussed and their effect on BG. Reinforced  "healthy eating.     Written handouts given on education provided.     Created goals with patient:  Choose one diet modification to focus on at a time. Once that is mastered, add another modification. Ex: start with low sodium, add low fat, add CHO counting.     Assessment:  Patient and wife asked appropriate questions. Concerns addressed. Expect overall good compliance. Needs rx for pen needles.     Recommendations:  1. Patient will work toward consuming 4-5 servings CHO with meals and 1-2 servings CHO with snacks.   2. Continue with current medication management given adequate BG control without frequent lows.   3. Continue to monitor BG and bring records to all follow-up appointments.     Refer to \"Guidelines for Insulin Initiation and Care in Hospitalized Adults\"  link in Diabetes Management Order set for dosing guidelines.  Hospital goals for blood glucose levels are < 180 mg/dL for improved health outcomes.      DISCHARGE NEEDS:  Pen needles 32G x 4MM, box of 100    Thank you,   Briseyda Germain RDN, CSPCC, LD, Diabetes Educator    [facility]  [address] [place] [address]  [email or web address]   Office: [phone]  Pager: [phone]  "

## 2022-09-06 NOTE — DISCHARGE SUMMARY
Cardiovascular Surgery Discharge Summary    Primary Care Physician:  Misha Dawson  Discharge Provider: Liv Madsen PA-C  Admission Date: 9/2/2022  Admission Diagnoses: CAD (coronary artery disease) [I25.10]  Coronary artery disease involving native heart, unspecified vessel or lesion type, unspecified whether angina present [I25.10]  Discharge Date: 9/7/2022  Disposition: Home  Condition at Discharge: Good  Code Status: Full Code     Principal Diagnosis:   Coronary artery disease S/P CABG X 2    Discharge Diagnoses:    Active Problems:      Patient Active Problem List   Diagnosis     WATTERS (dyspnea on exertion)     Coronary artery disease involving native heart, unspecified vessel or lesion type, unspecified whether angina present         Consult/s: Dietary, critical care medicine, cardiology    Surgery: 09/02/2022  CABG X 2 with Dr. Carline Bradshaw MD    PROCEDURES:    1.  Median sternotomy.  2.  Takedown of the left internal mammary artery.   3.  Endoscopic greater saphenous vein procurement from the left leg.  4.  Epiaortic ultrasound of the ascending aorta.  5.  Placement on central cardiopulmonary bypass.  6.  Double vessel coronary artery bypass grafting procedure; left internal mammary artery to the left anterior descending coronary artery and a separate reversed saphenous vein graft to the left anterior descending diagonal branch.  7.  Placement of temporary atrial and ventricular pacing wires.      Discharge Medications:      Review of your medicines      START taking      Dose / Directions   acetaminophen 325 MG tablet  Commonly known as: TYLENOL  Used for: S/P CABG (coronary artery bypass graft)      Dose: 650 mg  Take 2 tablets (650 mg) by mouth every 4 hours as needed for other, fever or headaches (For optimal non-opioid multimodal pain management to improve pain control.)  Quantity: 30 tablet  Refills: 0     amiodarone 200 MG tablet  Commonly known as: PACERONE  Used for: Postoperative  atrial fibrillation (H)  Notes to patient: Heart rate control      Dose: 200 mg  Take 1 tablet (200 mg) by mouth 2 times daily for 30 days  Quantity: 60 tablet  Refills: 0     aspirin 81 MG chewable tablet  Commonly known as: ASA  Used for: S/P CABG (coronary artery bypass graft)  Replaces: aspirin 81 MG EC tablet  Notes to patient: Blood thinner      Dose: 162 mg  2 tablets (162 mg) by Oral or NG Tube route daily  Quantity: 60 tablet  Refills: 3     furosemide 20 MG tablet  Commonly known as: LASIX  Used for: S/P CABG (coronary artery bypass graft)  Notes to patient: Water pills       Dose: 20 mg  Take 1 tablet (20 mg) by mouth daily for 7 days  Quantity: 7 tablet  Refills: 0     Metoprolol Tartrate 37.5 MG Tabs  Used for: S/P CABG (coronary artery bypass graft)  Notes to patient: heart      Dose: 37.5 mg  Take 37.5 mg by mouth 2 times daily  Quantity: 60 tablet  Refills: 2     oxyCODONE 5 MG tablet  Commonly known as: ROXICODONE  Used for: S/P CABG (coronary artery bypass graft)      Dose: 5 mg  Take 1 tablet (5 mg) by mouth every 4 hours as needed for moderate to severe pain  Quantity: 16 tablet  Refills: 0     potassium chloride ER 10 MEQ CR tablet  Commonly known as: KLOR-CON M  Used for: S/P CABG (coronary artery bypass graft)  Notes to patient: supplement      Dose: 10 mEq  Take 1 tablet (10 mEq) by mouth daily for 7 days  Quantity: 7 tablet  Refills: 0        CONTINUE these medicines which may have CHANGED, or have new prescriptions. If we are uncertain of the size of tablets/capsules you have at home, strength may be listed as something that might have changed.      Dose / Directions   * BD Pen Needle Micro U/F 32G X 6 MM miscellaneous  This may have changed: Another medication with the same name was added. Make sure you understand how and when to take each.  Generic drug: insulin pen needle      1 4 TIMES DAILY AS DIRECTED AS NEEDED E11.42  Refills: 0     * insulin pen needle 32G X 4 MM  miscellaneous  Commonly known as: 32G X 4 MM  This may have changed: You were already taking a medication with the same name, and this prescription was added. Make sure you understand how and when to take each.  Used for: S/P CABG (coronary artery bypass graft)      Use pen needles as directed.  Quantity: 100 each  Refills: 0     nitroGLYcerin 0.4 MG sublingual tablet  Commonly known as: NITROSTAT  This may have changed: Another medication with the same name was removed. Continue taking this medication, and follow the directions you see here.  Used for: WATTERS (dyspnea on exertion), Abnormal cardiovascular stress test      For chest pain place 1 tablet under the tongue every 5 minutes for 3 doses. If symptoms persist 5 minutes after 1st dose call 911.  Quantity: 25 tablet  Refills: 1         * This list has 2 medication(s) that are the same as other medications prescribed for you. Read the directions carefully, and ask your doctor or other care provider to review them with you.            CONTINUE these medicines which have NOT CHANGED      Dose / Directions   atorvastatin 80 MG tablet  Commonly known as: LIPITOR  Used for: WATTERS (dyspnea on exertion), Abnormal cardiovascular stress test  Notes to patient: Lowers cholesterol      Dose: 80 mg  Take 1 tablet (80 mg) by mouth daily  Quantity: 90 tablet  Refills: 3     finasteride 5 MG tablet  Commonly known as: PROSCAR  Notes to patient: prostate      Dose: 5 mg  Take 5 mg by mouth daily  Refills: 0     FLUoxetine 20 MG capsule  Commonly known as: PROzac  Notes to patient: antidepressant      TAKE 3 CAPSULES BY MOUTH IN THE MORNING  Refills: 0     glipiZIDE 5 MG 24 hr tablet  Commonly known as: GLUCOTROL XL  Notes to patient: Lower blood sugars       Dose: 5 mg  Take 5 mg by mouth 2 times daily (before meals)  Refills: 0     lactobacillus rhamnosus (GG) capsule  Notes to patient: supplement      Dose: 1 capsule  Take 1 capsule by mouth daily  Refills: 0     metFORMIN 1000  MG tablet  Commonly known as: GLUCOPHAGE  Notes to patient: Lowers blood sugars       Dose: 1,000 mg  [METFORMIN (GLUCOPHAGE) 1000 MG TABLET] Take 1,000 mg by mouth 2 (two) times a day with meals.  Refills: 0     OneTouch Delica Plus [id number] Misc      See Admin Instructions  Refills: 0     SOLIQUA SC      Dose: 25 Units  Inject 25 Units Subcutaneous daily 0-25 Units depending on blood sugar reading  Refills: 0     tamsulosin 0.4 MG capsule  Commonly known as: FLOMAX      Dose: 0.4 mg  Take 0.4 mg by mouth every evening  Refills: 0     vitamin D3 50 mcg (2000 units) tablet  Commonly known as: CHOLECALCIFEROL  Notes to patient: supplement      Dose: 2 tablet  Take 2 tablets by mouth daily  Refills: 0        STOP taking    aspirin 81 MG EC tablet  Commonly known as: ASA  Replaced by: aspirin 81 MG chewable tablet              Where to get your medicines      These medications were sent to [facility] [address] [place] [address] [id number]  [address] [facility] [address]    Phone: [phone]     acetaminophen 325 MG tablet    amiodarone 200 MG tablet    aspirin 81 MG chewable tablet    furosemide 20 MG tablet    insulin pen needle 32G X 4 MM miscellaneous    Metoprolol Tartrate 37.5 MG Tabs    oxyCODONE 5 MG tablet    potassium chloride ER 10 MEQ CR tablet         Discharge Instructions:    Follow up appointment with Primary Care Physician: Misha Dawson within 7-10 days of discharge from from the hospital.  Follow up appointment with Specialist:   Follow-up with your cardiac surgeon Dr. Carline Bradshaw MD'S DIPIKA on 10/11/2022 at 11 AM at the [facility]  Follow-up with your primary cardiologist Dr. Vianney Grande MD on 10/26/2022 at 12:50 PM at the [facility]    Diet: Cardiac    Activity/Restrictions: As tolerated with sternal precautions in mind (see below). No driving for 4 weeks or while on pain medication.     - Shower and  "wash your incisions daily with soap and water. No tub baths/hot tubs for 4 weeks. An antibacterial soap such as Dial or Safeguard is recommended.    - Check your incisions every day. If you notice any redness, drainage, or anything unusual, please call the surgeons office.    - No driving for 4 weeks after surgery or while on pain medication     - Do not lift anything more than 10 pounds for 6 weeks after surgery. After 6 weeks, advance lifting is tolerated.    - You may have watery drainage from your chest tube site for 2-3 weeks after surgery. Your may cover with a Band-Aid to protect your clothing. Remove the Band-Aid every day and wash the site.    - If you have a leg lesion, you may have swelling for 2-3 months. Elevate your leg any time you are not walking.    - If you feel any \"popping\" or \"clicking\" sensations in your chest, your arms are out too far or you are putting too much weight into arm movements. Do not reach over your head or out to the side to pull something. Do not do any arm exercises or use any exercise equipment that involves arm movement. If you feel your sternum moving, call the surgeon's office.    - Increase your daily activity as explained by Cardiac Rehab. You are encouraged to enroll in an Outpatient Cardiac Rehab Program.    - No active sports using your upper arms for 3 months. This includes fishing, hunting, bowling, swimming, tennis or golf.    - No physical activity such as cutting the grass, raking, vacuuming, changing sheets on your bed, snow shoveling, or using a  for 3 months.    - Use incentive spirometer 6-8 times per day for 2 weeks.       Hospital Summary:   Bala Hopper is a 71 year old male with past medical history of BPH, depression, diabetes mellitus, hyperlipidemia and obstructive sleep apnea who has been having some progressively worsening exertional dyspnea.  Patient had MRI of the heart that was positive for ischemia.  He underwent coronary angiogram " "that revealed single-vessel coronary artery disease. He was referred to CV surgery for evaluation for possible coronary artery revascularization.     Patient was deemed a candidate for coronary artery bypass surgery.  Following outpatient optimization, patient was admitted to the hospital on 09/02/2022, and was taken to the operating room where patient underwent 2 vessel coronary artery bypass and endoscopic vein harvest from the left leg and the following individual grafts;left internal mammary artery to the left anterior descending coronary artery and a separate reversed saphenous vein graft to the left anterior descending diagonal branch. Surgery was uneventful and patient was brought to the ICU post-operatively. He was extubated on POD#0 and weaned from pressors. Patient was awake and alert, afebrile, and with stable vitals. Insulin drip was discontinued and he was transitioned to a sliding scale. He was transferred to general telemetry status on POD#2. On the afternoon of postop day #2, patient went into A. fib with RVR, treated with amiodarone 150 mg IV bolus and then drip per protocol and patient converted back to normal sinus rhythm. On POD #3 was started on oral amiodarone therapy.  Patient has now had return of bowel function, is maintaining oxygen saturations on room air. His chest tubes removed on 09/04/2022, and has no complaints of chest pain or shortness of breath. On 09/06/22, patient is stable enough to be discharged to home.    Of note, patient is about 2 kg above preop weight, will discharge home on Lasix 20 mg daily for 7-days and supplemented with K-Dur 10 mEq daily for 7 days.    Vital signs:  Temp: 98.5  F (36.9  C) Temp src: Oral BP: 135/75 Pulse: 73   Resp: 18 SpO2: 94 % O2 Device: None (Room air) Oxygen Delivery: 2 LPM   Weight: 93.6 kg (206 lb 6.4 oz)  Estimated body mass index is 26.5 kg/m  as calculated from the following:    Height as of 8/17/22: 1.88 m (6' 2\").    Weight as of this " encounter: 93.6 kg (206 lb 6.4 oz).          Physical Exam:    Pertinent exam findings on day of discharge include:  Gen: Seen in bed. NAD. Pleasant and conversant.   CV: RRR on monitor. No edema.  Pulm: Non-labored breathing on room air.  Abd: Soft, non-tender, non-distended  Neuro: CNs grossly intact  Inc: C/D/I      _______  Liv Madsen PA-C  Cardiothoracic Surgery  [phone]

## 2022-09-06 NOTE — PROGRESS NOTES
CVTS Daily Progress Note   POD # 3 S/P CABG X 2  Attending: Dr. Carline Bradshaw MD  LOS: 4    SUBJECTIVE/INTERVAL EVENTS:  No acute events overnight. Patient progressing well. States pain is well managed on current regimen. Slept well overnight. Maintaining oxygen saturations on  room air. Normotensive and normal sinus rhythm per monitor.   Ambulating with therapy and tolerating well. + Bowel sounds, adequate UOP and BM. Tolerating diet. Chest tube and pacer wires removed 09/04/2022.   Patient denies new chest pain, shortness of breath, abdominal pain, calf pain, nausea. Patient has no questions today  OBJECTIVE:  Temp:  [97.8  F (36.6  C)-98.7  F (37.1  C)] 98.5  F (36.9  C)  Pulse:  [67-75] 70  Resp:  [18-20] 20  BP: (108-129)/(57-68) 127/65  SpO2:  [92 %-95 %] 92 %  Resp: 20    Vitals:    09/02/22 0530 09/03/22 0545 09/04/22 0600 09/05/22 0548   Weight: 91.9 kg (202 lb 8 oz) 94.5 kg (208 lb 4.8 oz) 94.9 kg (209 lb 3.2 oz) 93.8 kg (206 lb 12.8 oz)    09/06/22 0328   Weight: 93.9 kg (207 lb)       Current Medications:    Scheduled Meds:    acetaminophen  975 mg Oral Q8H     amiodarone  200 mg Oral BID     aspirin  162 mg Oral or NG Tube Daily     atorvastatin  80 mg Oral Daily     calcium gluconate  1 g Intravenous Once     finasteride  5 mg Oral Daily     FLUoxetine  60 mg Oral Daily     glipiZIDE  5 mg Oral BID AC     heparin ANTICOAGULANT  5,000 Units Subcutaneous Q8H     insulin aspart  1-7 Units Subcutaneous TID AC     insulin aspart  1-5 Units Subcutaneous At Bedtime     magnesium oxide  400 mg Oral Q4H     metFORMIN  1,000 mg Oral BID w/meals     metoprolol tartrate  25 mg Oral BID     pantoprazole  40 mg Oral Daily     polyethylene glycol  17 g Oral Daily     potassium & sodium phosphates  1 packet Oral or Feeding Tube Q4H     senna-docusate  1 tablet Oral BID     sodium chloride (PF)  3 mL Intracatheter Q8H     tamsulosin  0.4 mg Oral QPM     vitamin D3  100 mcg Oral Daily     Continuous Infusions:     phenylephrine Stopped (09/03/22 1123)     PRN Meds:.acetaminophen, bisacodyl, calcium gluconate, calcium gluconate, calcium gluconate, glucose **OR** dextrose **OR** glucagon, hydrALAZINE, lactated ringers, lidocaine 4%, lidocaine (buffered or not buffered), magnesium hydroxide, naloxone **OR** naloxone **OR** naloxone **OR** naloxone, ondansetron **OR** ondansetron, oxyCODONE **OR** oxyCODONE, phenylephrine, prochlorperazine **OR** prochlorperazine, sodium chloride (PF)    Cardiographics:    Telemetry monitoring demonstrates NSR with rates in the 60 per my personal review.    Imaging:  All available imaging reviewed, no acute concerns    Labs, personally reviewed.  BMP  Recent Labs   Lab 09/06/22  0451 09/05/22  2059 09/05/22  1731 09/05/22  1354 09/05/22  1126 09/05/22  0756 09/05/22  0438 09/04/22  0739 09/04/22  0525 09/03/22  0622 09/03/22  0404 09/02/22  1207 09/02/22  1200   NA  --   --   --   --   --   --  137  --   --   --  138  --  139   POTASSIUM 3.7  --   --   --   --   --  4.2  --  4.0  --  4.2  --  3.5  3.5   CHLORIDE  --   --   --   --   --   --  101  --   --   --  109*  --  109*   EVELYNE  --   --   --   --   --   --  8.3*  --   --   --  8.3*  --  8.6   CO2  --   --   --   --   --   --  28  --   --   --  22 --  22   BUN  --   --   --   --   --   --  17  --   --   --  18  --  16   CR  --   --   --   --   --   --  0.77  --   --   --  0.78  --  0.85   GLC  --  145* 158* 139* 225*   < > 208*   < >  --    < > 122   < > 197*    < > = values in this interval not displayed.     CBC  Recent Labs   Lab 09/06/22  0451 09/05/22  0438 09/03/22  0404 09/02/22  1200 09/02/22  1113   WBC  --  10.5 13.8* 15.4* 24.2*   RBC  --  3.12* 3.42* 3.35* 3.11*   HGB  --  9.9* 10.8* 10.6* 9.8*   HCT  --  29.2* 32.2* 31.2* 29.5*   MCV  --  94 94 93 95   MCH  --  31.7 31.6 31.6 31.5   MCHC  --  33.9 33.5 34.0 33.2   RDW  --  13.3 13.6 13.2 13.2    200 298 209 257     INR  Recent Labs   Lab 09/02/22  1200 09/02/22  1113   INR  1.37* 1.43*      Hepatic Panel  Recent Labs   Lab 09/03/22  0404 09/02/22  1200   AST 27 17   ALT 15 15   ALKPHOS 54 51   BILITOTAL 0.8 0.5   ALBUMIN 3.2* 2.6*       Magnesium   Date Value Ref Range Status   09/06/2022 2.0 1.8 - 2.6 mg/dL Final   09/05/2022 1.7 (L) 1.8 - 2.6 mg/dL Final   09/04/2022 1.8 1.8 - 2.6 mg/dL Final        I/O:  I/O last 3 completed shifts:  In: 1293 [P.O.:810; I.V.:483]  Out: 975 [Urine:975]       Blood pressure 127/65, pulse 70, temperature 98.5  F (36.9  C), temperature source Oral, resp. rate 20, weight 93.9 kg (207 lb), SpO2 92 %.  Vitals:    09/04/22 0600 09/05/22 0548 09/06/22 0328   Weight: 94.9 kg (209 lb 3.2 oz) 93.8 kg (206 lb 12.8 oz) 93.9 kg (207 lb)        Physical Exam:  Weight 93.9 kg from 93.8 kg yesterday, preop weight 91.9 kg.   24 hr Fluid status; net loss 380 mL.  mL  MAPs: 90    Gen: A&Ox4, out of bed to chair, with NAD  Neuro: no focal deficits   CV: RRR, normal S1 S2, no murmurs, rubs or gallops.  No JVD  Pulm: CTA, no wheezing or rhonchi, normal breathing on RA  Abd: nondistended, normal BS, soft, nontender  Ext: No lower extremity edema appreciated  Incision: clean, dry, intact, no erythema, sternum stable  Tubes/drain sites: dressing clean and dry.     ASSESSMENT/PLAN:  Bala Hopper is a 71 year old male with a history of BPH, depression, diabetes mellitus, hyperlipidemia and obstructive sleep apnea who is POD # 4 S/P CABG X 2.  Awake and alert, out of bed to chair, with no apparent distress  AVSS, postop A. fib with RVR now NSR per monitor  Denies incisional chest pain and no shortness of breath  Postop respiratory insufficiency resolved, room air saturation 93%  Adequate inspiratory effort, I-S 1000 cc  Diabetes mellitus currently on AC & HS SSII  Incisions are clean dry and intact, lungs sound diminished to bases  Chest tube removed 09/04/2022  Weight 93.9 kg from 93.8 kg yesterday, preop weight 91.9 kg  Lasix 20 mg daily 7 days supplemented with  K-Dur 30 mEq daily for 7 days  Continue pulmonary toilet and mobilize ambulate 3-4 times daily  Ready for discharge to home today 09/06/2022  No lifting, no pushing, no pulling more than 10 pounds for 6 to 8 weeks  No bathtub bathing and no swimming for bathing for 8 to 10 weeks  Shower daily and dab incision dry, leave open to air, signs or symptoms of infection which include redness, swelling, drainage, and or warmth at incision site, if any of these is present, to call CV surgery at [phone] and or go to nearest emergency room.  Follow-up with your PCP Misha Abraham MD within 7 to 10 days of hospital discharge  Follow-up with your cardiac surgeon Dr. Carline Bradshaw MD'S DIPIKA on 10/11/2022 at 11 AM at the [facility]  Follow-up with your primary cardiologist Dr. Vianney Grande MD on 10/26/2022 at 12:50 PM at the [facility]           NEURO:   - Scheduled Tylenol and PRN oxycodone for pain  - Melatonin QHS    CV:   - Normotensive  - Metoprolol  25 mg two times a day starting tonight  - ASA 162mg  - Atorvastatin 80 mg daily  - Chest tubes and pacer wires removed 09/04/2022    PULM:   - Maintaining oxygen saturations on  room air  - Encourage pulmonary toilet    FEN/GI:  - Continue electrolyte replacement protocol  - Diet: Cardiac, ADAT   - Bowel regimen    RENAL:  - Adequate UOP/hr. Continue to monitor closely via napoles.   - Cr 0.78  - Napoles catheter removed 09/04/2022.   - Diuresis with Lasix 40mg IV  two times a day    HEME:  - Acute blood loss anemia post-op.   - Hgb stable, no bleeding concerns. Hep SQ, [id number]    ID:  - Jessy op ppx complete, afebrile . No concerns for infection    ENDO:   - Continue AC & HS SSI     PPx:   - DVT: SCDs, SQ heparin TID, ambulation   - GI: Protonix 40mg PO daily    DISPO:   -Ready for discharge to home today 09/06/2022        Patient discussed with Dr. Aleksandra Bradshaw MD.     Sridhar  ALHAJI Bro  Cardiothoracic Surgery  Pager [phone]    9:45 AM   September 6, 2022

## 2022-09-06 NOTE — PROGRESS NOTES
sc  Patient Name: Bala Hopper   MRN: [id number]   Date of Admission: 9/2/2022    Procedure: Procedure(s):  CORONARY ARTERY BYPASS GRAFT (CABG)X2 LEFT LEG ENDOSCOPIC SAPHENOUS VESSEL PROCUREMENT, LEFT INTERNAL MAMMARY ARTERY HARVEST, ANESTHESIA TRANSESOPHAGEAL ECHOCARDIOGRAM;EPI AORTIC ULTRASOUND    Post Op day #:3    Subjective (Patient focus/Primary Problem for shift): follow sternal precautions          Pain Goal 0 Pain Rating 0          Pain Medication/ Regime effective to reduce patient pain scheduled Acetaminophen  Objective (Physical assessment):           Rhythm:NSR          Bowel Activity: No if Yes indicate when: n/a          Bowel Medications: YES                 Incision: healing well          Incentive Spirometry Q 1-2 hour when awake:  YES           Volume: 1000         Epicardial wires: Not applicable            Patient Activity:           Up to chair for meals:YES, Most          Ambulation with RN x2 YES Ambulated in room           Is patient in home clothes: NO            Chest Tubes   Pleural: Not applicable Draining:  Not applicable               Suction: Not applicable               Mediastinal:  Not applicable  Draining:  Not applicable                Suction: Not applicable    Dressing Change Daily YES If No, why? n/a                     Urinary Catheter: NO           Preventative WOC consult (need MD order): NO      Assessment (Nursing primary shift focus):Up to bathroom with full staff assist and use of gait belt. Sternal precautions in place and occasional  reminders given to patient to use sternal pillow. Needs reminders to use call light when wanting to get OOB Does not consistently follow these reminders; can be impulsive. Got up once impulsively and set off bed alarm. Tried to use urinal and urinated on the floor. Received 1x dose of Albumin on evening shift. Amiodarone gtt stopped 1 hour after patient got 21:00 dose of  Amiodarone PO.     Plan:  Continue to monitor. MD notes  indicate possible discharge tomorrow but wife is concerned that patient is too impulsive and doesn't always use appropriate judgment when getting up, etc. Says she is alone and can't take care of patient is he is acting this way. Will leave note for MD. Skylar Thao RN   9/6/22  00:18

## 2022-09-06 NOTE — PROGRESS NOTES
Care Management Follow Up    Length of Stay (days): 4    Expected Discharge Date: 09/06/2022     Concerns to be Addressed:       Patient plan of care discussed at interdisciplinary rounds: Yes    Anticipated Discharge Disposition: Home     Anticipated Discharge Services:  Recommended outpatient Cardiac Rehab   Anticipated Discharge DME:      Patient/family educated on Medicare website which has current facility and service quality ratings:  Yes  Education Provided on the Discharge Plan:  Yes  Patient/Family in Agreement with the Plan:  No    Referrals Placed by CM/SW:    Private pay costs discussed: insurance costs out of pocket expenses    Additional Information:  SW met with patient and wife in patient's room. SW explained and provided a copy of the Detailed Notice of Discharge. Patient's wife is worried that he is having some cognitive issues and forget to ask for help when he get up to use the bathroom. SW offered to set up TCU or home care for the patient, but wife said she does not think patient need to go to a TCU as she thinks he only needs one more day to recover. Patient's wife declined home care as that would not help with his cognitive confusion. Patient and his wife believe that if he stays at the hospital one more night that he would be okay to discharge tomorrow between 11-1.    Control id number [id number]      Carie Gutierres

## 2022-09-06 NOTE — PLAN OF CARE
sc  Patient Name: Bala Hopper   MRN: [id number]   Date of Admission: 9/2/2022    Procedure: Procedure(s):  CORONARY ARTERY BYPASS GRAFT (CABG)X2 LEFT LEG ENDOSCOPIC SAPHENOUS VESSEL PROCUREMENT, LEFT INTERNAL MAMMARY ARTERY HARVEST, ANESTHESIA TRANSESOPHAGEAL ECHOCARDIOGRAM;EPI AORTIC ULTRASOUND    Post Op day #:4    Subjective (Patient focus/Primary Problem for shift): Improving orientation, compliance with sternal precautions, steady gait, constipation and urinary retention.            Pain Goal 0 Pain Rating 0/10          Pain Medication/ Regime effective to reduce patient pain Yes, on scheduled tylenol    Objective (Physical assessment):           Rhythm: normal sinus rhythm            Bowel Activity: yes if Yes indicate when: 9/06 @0325, had medium brown formed bowel movement          Bowel Medications: yes            Incision: healing well          Incentive Spirometry Q 1-2 hour when awake:  yes Volume: patient refusing overnight          Epicardial Pacing Wires:  no            Patient Activity:           Up to chair for meals: not applicable          Ambulation with RN x2 (Not including CR): ambulated to bathroom, unsteady gait, needs assistance           Is patient in home clothes:no             Chest Tubes   Pleural: no Draining: not applicable              Suction: not applicable              Mediastinal: no Draining: not applicable               Suction: not applicable   Dressing Change Daily:no If No, why? open to air                     Urinary Catheter: no           Preventative WOC consult (need MD order): no       Assessment (Nursing primary shift focus): Patient alert and oriented x4. No confusion witnessed, called appropriately. Patient had unsteady gait with ambulation but compliant with sternal precautions. Patient had a normal BM. Patient urinated 300cc and PVR was 260. Patient reports past history with urinary retention. Phosphorous low, replacement started. Continuing to monitor.    Plan  (Patient Care Plan/focus): Monitor for retention, monitor for delirium, and improve gait.    Violeta Bernabe RN   9/6/2022   4:40 AM

## 2022-09-06 NOTE — PLAN OF CARE
Problem: Risk for Delirium  Goal: Optimal Coping  Outcome: Ongoing, Progressing  Goal: Improved Behavioral Control  Outcome: Ongoing, Progressing  Goal: Improved Attention and Thought Clarity  Outcome: Ongoing, Progressing  Goal: Improved Sleep  Outcome: Ongoing, Progressing   Goal Outcome Evaluation:      Pt is alert & oriented x4. Pt denies SOB, chest pain, pain, numbness or tingling. Pt completed anti-bacterial shower with 2 assist. Continues to be on sternal precaution. Pt walked down the santo with PT. Pt is cleared for discharge but wife feels patient is not ready d/t stairs and wife being the only care provider. Wife planning on appeal for discharge. Pt is able to make needs known. Call light within reach. Wife at bedside.

## 2022-09-07 ENCOUNTER — APPOINTMENT (OUTPATIENT)
Dept: OCCUPATIONAL THERAPY | Facility: HOSPITAL | Age: 72
DRG: 236 | End: 2022-09-07
Attending: THORACIC SURGERY (CARDIOTHORACIC VASCULAR SURGERY)
Payer: COMMERCIAL

## 2022-09-07 VITALS
BODY MASS INDEX: 26.5 KG/M2 | TEMPERATURE: 98.5 F | HEART RATE: 73 BPM | WEIGHT: 206.4 LBS | RESPIRATION RATE: 18 BRPM | SYSTOLIC BLOOD PRESSURE: 135 MMHG | OXYGEN SATURATION: 94 % | DIASTOLIC BLOOD PRESSURE: 75 MMHG

## 2022-09-07 LAB
CALCIUM, IONIZED MEASURED: 1.12 MMOL/L (ref 1.11–1.3)
GLUCOSE BLDC GLUCOMTR-MCNC: 145 MG/DL (ref 70–99)
GLUCOSE BLDC GLUCOMTR-MCNC: 188 MG/DL (ref 70–99)
HOLD SPECIMEN: NORMAL
ION CA PH 7.4: 1.15 MMOL/L (ref 1.11–1.3)
MAGNESIUM SERPL-MCNC: 1.8 MG/DL (ref 1.8–2.6)
PH: 7.44 (ref 7.35–7.45)
PHOSPHATE SERPL-MCNC: 2.2 MG/DL (ref 2.5–4.5)
POTASSIUM BLD-SCNC: 3.9 MMOL/L (ref 3.5–5)

## 2022-09-07 PROCEDURE — 84100 ASSAY OF PHOSPHORUS: CPT | Performed by: THORACIC SURGERY (CARDIOTHORACIC VASCULAR SURGERY)

## 2022-09-07 PROCEDURE — 83735 ASSAY OF MAGNESIUM: CPT | Performed by: THORACIC SURGERY (CARDIOTHORACIC VASCULAR SURGERY)

## 2022-09-07 PROCEDURE — 250N000011 HC RX IP 250 OP 636: Performed by: PHYSICIAN ASSISTANT

## 2022-09-07 PROCEDURE — 97535 SELF CARE MNGMENT TRAINING: CPT | Mod: GO

## 2022-09-07 PROCEDURE — 250N000013 HC RX MED GY IP 250 OP 250 PS 637: Performed by: PHYSICIAN ASSISTANT

## 2022-09-07 PROCEDURE — 97110 THERAPEUTIC EXERCISES: CPT | Mod: GO

## 2022-09-07 PROCEDURE — 82330 ASSAY OF CALCIUM: CPT | Performed by: PHYSICIAN ASSISTANT

## 2022-09-07 PROCEDURE — 84132 ASSAY OF SERUM POTASSIUM: CPT | Performed by: THORACIC SURGERY (CARDIOTHORACIC VASCULAR SURGERY)

## 2022-09-07 PROCEDURE — 250N000013 HC RX MED GY IP 250 OP 250 PS 637: Performed by: THORACIC SURGERY (CARDIOTHORACIC VASCULAR SURGERY)

## 2022-09-07 PROCEDURE — 36415 COLL VENOUS BLD VENIPUNCTURE: CPT | Performed by: PHYSICIAN ASSISTANT

## 2022-09-07 RX ORDER — METOPROLOL TARTRATE 37.5 MG/1
37.5 TABLET, FILM COATED ORAL 2 TIMES DAILY
Qty: 60 TABLET | Refills: 2 | Status: ON HOLD | OUTPATIENT
Start: 2022-09-07 | End: 2022-09-16

## 2022-09-07 RX ORDER — MAGNESIUM OXIDE 400 MG/1
400 TABLET ORAL EVERY 4 HOURS
Status: DISCONTINUED | OUTPATIENT
Start: 2022-09-07 | End: 2022-09-07 | Stop reason: HOSPADM

## 2022-09-07 RX ADMIN — ASPIRIN 162 MG: 81 TABLET, CHEWABLE ORAL at 08:01

## 2022-09-07 RX ADMIN — HEPARIN SODIUM 5000 UNITS: 5000 INJECTION, SOLUTION INTRAVENOUS; SUBCUTANEOUS at 05:07

## 2022-09-07 RX ADMIN — FUROSEMIDE 20 MG: 20 TABLET ORAL at 07:57

## 2022-09-07 RX ADMIN — ATORVASTATIN CALCIUM 80 MG: 40 TABLET, FILM COATED ORAL at 08:00

## 2022-09-07 RX ADMIN — AMIODARONE HYDROCHLORIDE 200 MG: 200 TABLET ORAL at 08:01

## 2022-09-07 RX ADMIN — Medication 100 MCG: at 07:57

## 2022-09-07 RX ADMIN — ACETAMINOPHEN 975 MG: 325 TABLET, FILM COATED ORAL at 05:06

## 2022-09-07 RX ADMIN — FINASTERIDE 5 MG: 5 TABLET, FILM COATED ORAL at 08:00

## 2022-09-07 RX ADMIN — FLUOXETINE 60 MG: 20 CAPSULE ORAL at 07:56

## 2022-09-07 RX ADMIN — POTASSIUM CHLORIDE 10 MEQ: 750 TABLET, EXTENDED RELEASE ORAL at 08:00

## 2022-09-07 RX ADMIN — PANTOPRAZOLE SODIUM 40 MG: 40 TABLET, DELAYED RELEASE ORAL at 07:59

## 2022-09-07 RX ADMIN — GLIPIZIDE 5 MG: 5 TABLET, EXTENDED RELEASE ORAL at 08:02

## 2022-09-07 RX ADMIN — Medication 400 MG: at 08:00

## 2022-09-07 RX ADMIN — METOPROLOL TARTRATE 37.5 MG: 25 TABLET, FILM COATED ORAL at 08:22

## 2022-09-07 RX ADMIN — POTASSIUM & SODIUM PHOSPHATES POWDER PACK 280-160-250 MG 1 PACKET: 280-160-250 PACK at 08:22

## 2022-09-07 RX ADMIN — METFORMIN HYDROCHLORIDE 1000 MG: 500 TABLET ORAL at 08:01

## 2022-09-07 ASSESSMENT — ACTIVITIES OF DAILY LIVING (ADL)
ADLS_ACUITY_SCORE: 24

## 2022-09-07 NOTE — PLAN OF CARE
Problem: Risk for Delirium  Goal: Optimal Coping  Outcome: Ongoing, Progressing  Goal: Improved Behavioral Control  Outcome: Ongoing, Progressing  Goal: Improved Attention and Thought Clarity  Outcome: Ongoing, Progressing  Goal: Improved Sleep  Outcome: Ongoing, Progressing   Goal Outcome Evaluation:       Patient is alert and oriented. Patient's vital signs are stable. Pt denies SOB, chest pain, numbness/tingling. Pt completed PT session. Pt had IV taken out, tele taken out. Pt discharge home. Wife with patient in discharge teaching and being the transportation home.

## 2022-09-07 NOTE — PROGRESS NOTES
sc  Patient Name: Bala Hopper   MRN: [id number]   Date of Admission: 9/2/2022    Procedure: Procedure(s):  CORONARY ARTERY BYPASS GRAFT (CABG)X2 LEFT LEG ENDOSCOPIC SAPHENOUS VESSEL PROCUREMENT, LEFT INTERNAL MAMMARY ARTERY HARVEST, ANESTHESIA TRANSESOPHAGEAL ECHOCARDIOGRAM;EPI AORTIC ULTRASOUND    Post Op day #:4    Subjective (Patient focus/Primary Problem for shift): Activity          Pain Goal0 Pain Rating0           Pain Medication/ Regime effective to reduce patient pain Scheduled Tylenol    Objective (Physical assessment):           Rhythm: normal sinus rhythm            Bowel Activity: yes if Yes indicate when: 9/6/22          Bowel Medications: yes            Incision: healing well          Incentive Spirometry Q 1-2 hour when awake:  yes Volume: 1000          Epicardial Pacing Wires:  not applicable            Patient Activity:           Up to chair for meals: yes          Ambulation with RN x2 (Not including CR): yes            Is patient in home clothes:no             Chest Tubes   Pleural: not applicable Draining: not applicable               Suction: not applicable              Mediastinal: not applicable Draining: not applicable               Suction: not applicable   Dressing Change Daily:not applicable If No, why?NA                     Urinary Catheter: no           Preventative WOC consult (need MD order): not applicable       Assessment (Nursing primary shift focus): No pain, Tolerated ambulation.     Plan (Patient Care Plan/focus): Continue activity and IS use.      Jailene Pool RN   9/6/2022   10:25 PM

## 2022-09-07 NOTE — PLAN OF CARE
sc  Patient Name: Bala Hopper   MRN: [id number]   Date of Admission: 9/2/2022    Procedure: Procedure(s):  CORONARY ARTERY BYPASS GRAFT (CABG)X2 LEFT LEG ENDOSCOPIC SAPHENOUS VESSEL PROCUREMENT, LEFT INTERNAL MAMMARY ARTERY HARVEST, ANESTHESIA TRANSESOPHAGEAL ECHOCARDIOGRAM;EPI AORTIC ULTRASOUND    Post Op day #: 5    Subjective (Patient focus/Primary Problem for shift): Sleep          Pain Goal 0 Pain Rating 0          Pain Medication/ Regime effective to reduce patient pain: Yes    Objective (Physical assessment):           Rhythm: NSR            Bowel Activity: no if Yes indicate when:           Bowel Medications: Yes            Incision: healing well          Incentive Spirometry Q 1-2 hour when awake:  not applicable Volume: 1000          Epicardial Pacing Wires:  not applicable            Patient Activity:           Up to chair for meals: yes          Ambulation with RN x2 (Not including CR): not applicable            Is patient in home clothes:no             Chest Tubes   Pleural: no Draining: not applicable               Suction: not applicable              Mediastinal: no Draining: not applicable               Suction: not applicable   Dressing Change Daily:not applicable If No, why? Open to air                     Urinary Catheter: no           Preventative WOC consult (need MD order): no       Assessment/Plan: Denied any pain. Getting scheduled tylenol. Discharge home today.      Ning Hogue RN   9/7/2022   5:16 AM

## 2022-09-07 NOTE — PROGRESS NOTES
SW spoke with patient's wife while patient was doing PT. Patient's wife said that she is planning to take her  home today as he seems to be feeling much better.    Carie Gutierres

## 2022-09-07 NOTE — PLAN OF CARE
Cardiac Rehab Discharge Summary    Reason for therapy discharge:    Discharged to home with outpatient therapy.    Progress towards therapy goal(s). See goals on Care Plan in [facility] electronic health record for goal details.  Goals partially met.  Barriers to achieving goals:   discharge from facility./unsteady on feet , purchased gait belt for wife    Therapy recommendation(s):    Continued therapy is recommended.  Rationale/Recommendations:  Outpt. cardiac rehab.

## 2022-09-08 ENCOUNTER — PATIENT OUTREACH (OUTPATIENT)
Dept: CARE COORDINATION | Facility: CLINIC | Age: 72
End: 2022-09-08

## 2022-09-08 NOTE — PROGRESS NOTES
"Clinic Care Coordination Contact  [facility]: Post-Discharge Note  SITUATION                                                      Admission:    Admission Date: 09/02/22   Reason for Admission: WATTERS (dyspnea on exertion)  Discharge:   Discharge Date: 09/07/22  Discharge Diagnosis: WATTERS (dyspnea on exertion)    BACKGROUND                                                      Per hospital discharge summary and inpatient provider notes:Bala Hopper is a 71 year old male with past medical history of BPH, depression, diabetes mellitus, hyperlipidemia and obstructive sleep apnea who has been having some progressively worsening exertional dyspnea.  Patient had MRI of the heart that was positive for ischemia.  He underwent coronary angiogram that revealed single-vessel coronary artery disease. He was referred to CV surgery for evaluation for possible coronary artery revascularization.       ASSESSMENT           Discharge Assessment  How are you doing now that you are home?: \" Doing fairly well\"  How are your symptoms? (Red Flag symptoms escalate to triage hotline per guidelines): Improved  Do you feel your condition is stable enough to be safe at home until your provider visit?: Yes  Does the patient have their discharge instructions? : Yes  Does the patient have questions regarding their discharge instructions? : No  Were you started on any new medications or were there changes to any of your previous medications? : Yes  Does the patient have all of their medications?: Yes  Do you have questions regarding any of your medications? : No  Do you have all of your needed medical supplies or equipment (DME)?  (i.e. oxygen tank, CPAP, cane, etc.): Yes  Discharge follow-up appointment scheduled within 14 calendar days? : Yes  Discharge Follow Up Appointment Date: 09/15/22  Discharge Follow Up Appointment Scheduled with?: Primary Care Provider    Post-op ([facility] CTA Only)  If the patient had a surgery or procedure, do they have " any questions for a nurse?: No             PLAN                                                      Outpatient Plan:Follow-up with your Primary Care Clinic with Misha Abraham MD within 7 to 10 days of hospital  discharg    Future Appointments   Date Time Provider Department Center   9/14/2022 10:00 AM DENIS CARDIAC REHAB RES[facility] 2 [facility]   10/11/2022 11:00 [facility]   10/26/2022 12:50 PM Sreedhar Grande MD [facility]         For any urgent concerns, please contact our 24 hour nurse triage line: [phone] ([phone])         Mike Bryant

## 2022-09-09 ENCOUNTER — TELEPHONE (OUTPATIENT)
Dept: CARDIOLOGY | Facility: CLINIC | Age: 72
End: 2022-09-09

## 2022-09-09 NOTE — TELEPHONE ENCOUNTER
CARDIOTHORACIC SURGERY  Discharge Follow Up Phone Call    POST OP MONITORING  How is your pain on a 0-10 scale, how are you managing your pain? 0/10 not currently taking anything for pain.    ACTIVITY  How is your activity tolerance? Getting around well, sleeping in the chair in the living room and walking around the house.  Are you still doing sternal precautions? Yes  Do you hear any clicking when you are moving or taking a deep breath? no  Are you using your incentive spirometer? yes  Are you weighing yourself daily? 206.8 lbs    SIGNS AND SYMPTOMS OF INFECTION  1. INCREASE IN PAIN - No  2. FEVER - no  3. DRAINAGE - no   4. REDNESS - no  5. SWELLING - no    ASSISTANCE  Do you have someone at home to assist you with your daily activities? Yes    MEDICATIONS  Is someone helping you to set up your medications? Yes  Do you have any questions about your medications? Not right now    FOLLOW UP  You are scheduled to see your primary care physician on 9/15.  You are scheduled to see our surgery advanced practive provider for post operative follow up on 10/11.  You are scheduled for cardiac rehab on 9/14.  You are scheduled to see your cardiologist on 10/26.    CONTACT INFORMATION  Please feel free to call us with any questions or symptoms that are concerning for you at [phone]. If it is after 4:30 in the afternoon, or a weekend please call [phone] and ask for the on call specialist. We want to do everything we can to help prevent you needing to return to the ED, so please do not hesitate to call us.    Latanya Menchaca, RNCC  Cardiothoracic Surgery  [phone]

## 2022-09-13 ENCOUNTER — HOSPITAL ENCOUNTER (INPATIENT)
Facility: HOSPITAL | Age: 72
LOS: 3 days | Discharge: HOME | DRG: 205 | End: 2022-09-16
Attending: EMERGENCY MEDICINE | Admitting: INTERNAL MEDICINE
Payer: COMMERCIAL

## 2022-09-13 ENCOUNTER — APPOINTMENT (OUTPATIENT)
Dept: CT IMAGING | Facility: HOSPITAL | Age: 72
DRG: 205 | End: 2022-09-13
Payer: COMMERCIAL

## 2022-09-13 ENCOUNTER — TELEPHONE (OUTPATIENT)
Dept: CARDIOLOGY | Facility: CLINIC | Age: 72
End: 2022-09-13

## 2022-09-13 ENCOUNTER — APPOINTMENT (OUTPATIENT)
Dept: RADIOLOGY | Facility: HOSPITAL | Age: 72
DRG: 205 | End: 2022-09-13
Payer: COMMERCIAL

## 2022-09-13 DIAGNOSIS — R53.83 FATIGUE: ICD-10-CM

## 2022-09-13 DIAGNOSIS — J18.9 PNEUMONIA: Primary | ICD-10-CM

## 2022-09-13 DIAGNOSIS — Z95.1 S/P CABG (CORONARY ARTERY BYPASS GRAFT): ICD-10-CM

## 2022-09-13 DIAGNOSIS — I25.10 CAD (CORONARY ARTERY DISEASE): ICD-10-CM

## 2022-09-13 DIAGNOSIS — I50.31 ACUTE HEART FAILURE WITH PRESERVED EJECTION FRACTION (HFPEF) (H): ICD-10-CM

## 2022-09-13 DIAGNOSIS — R55 NEAR SYNCOPE: ICD-10-CM

## 2022-09-13 DIAGNOSIS — I48.20 CHRONIC ATRIAL FIBRILLATION (H): ICD-10-CM

## 2022-09-13 LAB
ALBUMIN SERPL-MCNC: 2.9 G/DL (ref 3.5–5)
ALBUMIN UR-MCNC: NEGATIVE MG/DL
ALP SERPL-CCNC: 110 U/L (ref 45–120)
ALT SERPL W P-5'-P-CCNC: 32 U/L (ref 0–45)
ANION GAP SERPL CALCULATED.3IONS-SCNC: 11 MMOL/L (ref 5–18)
APPEARANCE UR: CLEAR
APTT PPP: 27 SECONDS (ref 22–38)
AST SERPL W P-5'-P-CCNC: 21 U/L (ref 0–40)
BASE EXCESS BLDA CALC-SCNC: -2.3 MMOL/L
BASE EXCESS BLDA CALC-SCNC: -4.1 MMOL/L
BASE EXCESS BLDA CALC-SCNC: -5.9 MMOL/L
BASE EXCESS BLDA CALC-SCNC: 0.3 MMOL/L
BASE EXCESS BLDV CALC-SCNC: -0.7 MMOL/L
BASOPHILS # BLD AUTO: 0 10E3/UL (ref 0–0.2)
BASOPHILS NFR BLD AUTO: 0 %
BILIRUB SERPL-MCNC: 0.6 MG/DL (ref 0–1)
BILIRUB UR QL STRIP: NEGATIVE
BNP SERPL-MCNC: 190 PG/ML (ref 0–69)
BUN SERPL-MCNC: 19 MG/DL (ref 8–28)
CA-I BLD-MCNC: 1.03 MMOL/L (ref 1.11–1.3)
CA-I BLD-MCNC: 1.04 MMOL/L (ref 1.11–1.3)
CA-I BLD-MCNC: 1.17 MMOL/L (ref 1.11–1.3)
CA-I BLD-MCNC: 1.27 MMOL/L (ref 1.11–1.3)
CA-I BLD-MCNC: 1.46 MMOL/L (ref 1.11–1.3)
CALCIUM SERPL-MCNC: 8.6 MG/DL (ref 8.5–10.5)
CHLORIDE BLD-SCNC: 100 MMOL/L (ref 98–107)
CK SERPL-CCNC: 81 U/L (ref 30–190)
CO2 SERPL-SCNC: 20 MMOL/L (ref 22–31)
COHGB MFR BLD: 100 % (ref 95–96)
COLOR UR AUTO: ABNORMAL
CREAT SERPL-MCNC: 0.87 MG/DL (ref 0.7–1.3)
EOSINOPHIL # BLD AUTO: 0.1 10E3/UL (ref 0–0.7)
EOSINOPHIL NFR BLD AUTO: 1 %
ERYTHROCYTE [DISTWIDTH] IN BLOOD BY AUTOMATED COUNT: 13.7 % (ref 10–15)
GFR SERPL CREATININE-BSD FRML MDRD: >90 ML/MIN/1.73M2
GLUCOSE BLD-MCNC: 165 MG/DL (ref 70–125)
GLUCOSE BLD-MCNC: 184 MG/DL (ref 70–125)
GLUCOSE BLD-MCNC: 196 MG/DL (ref 70–125)
GLUCOSE BLD-MCNC: 197 MG/DL (ref 70–125)
GLUCOSE BLD-MCNC: 96 MG/DL (ref 70–125)
GLUCOSE BLD-MCNC: 98 MG/DL (ref 70–125)
GLUCOSE BLDC GLUCOMTR-MCNC: 110 MG/DL (ref 70–99)
GLUCOSE BLDC GLUCOMTR-MCNC: 69 MG/DL (ref 70–99)
GLUCOSE UR STRIP-MCNC: NEGATIVE MG/DL
HCO3 BLDA-SCNC: 20 MMOL/L (ref 23–29)
HCO3 BLDA-SCNC: 21 MMOL/L (ref 23–29)
HCO3 BLDA-SCNC: 23 MMOL/L (ref 23–29)
HCO3 BLDA-SCNC: 25 MMOL/L (ref 23–29)
HCO3 BLDV-SCNC: 23 MMOL/L (ref 24–30)
HCT VFR BLD AUTO: 28.8 % (ref 40–53)
HGB BLD-MCNC: 10 G/DL (ref 13.3–17.7)
HGB BLD-MCNC: 10.1 G/DL (ref 13.3–17.7)
HGB BLD-MCNC: 10.1 G/DL (ref 13.3–17.7)
HGB BLD-MCNC: 11.1 G/DL (ref 13.3–17.7)
HGB BLD-MCNC: 11.7 G/DL (ref 13.3–17.7)
HGB BLD-MCNC: 9.7 G/DL (ref 13.3–17.7)
HGB UR QL STRIP: ABNORMAL
HOLD SPECIMEN: NORMAL
HOLD SPECIMEN: NORMAL
IMM GRANULOCYTES # BLD: 0.3 10E3/UL
IMM GRANULOCYTES NFR BLD: 2 %
INR PPP: 1.01 (ref 0.85–1.15)
KETONES UR STRIP-MCNC: NEGATIVE MG/DL
LACTATE BLD-SCNC: 2.2 MMOL/L (ref 0.7–2)
LACTATE BLD-SCNC: 2.2 MMOL/L (ref 0.7–2)
LACTATE BLD-SCNC: 2.3 MMOL/L (ref 0.7–2)
LACTATE BLD-SCNC: 3.2 MMOL/L (ref 0.7–2)
LACTATE BLD-SCNC: 4 MMOL/L (ref 0.7–2)
LEUKOCYTE ESTERASE UR QL STRIP: NEGATIVE
LYMPHOCYTES # BLD AUTO: 1.9 10E3/UL (ref 0.8–5.3)
LYMPHOCYTES NFR BLD AUTO: 14 %
MAGNESIUM SERPL-MCNC: 2 MG/DL (ref 1.8–2.6)
MCH RBC QN AUTO: 31.4 PG (ref 26.5–33)
MCHC RBC AUTO-ENTMCNC: 33.7 G/DL (ref 31.5–36.5)
MCV RBC AUTO: 93 FL (ref 78–100)
MONOCYTES # BLD AUTO: 1 10E3/UL (ref 0–1.3)
MONOCYTES NFR BLD AUTO: 8 %
MUCOUS THREADS #/AREA URNS LPF: PRESENT /LPF
NEUTROPHILS # BLD AUTO: 10.3 10E3/UL (ref 1.6–8.3)
NEUTROPHILS NFR BLD AUTO: 75 %
NITRATE UR QL: NEGATIVE
NRBC # BLD AUTO: 0 10E3/UL
NRBC BLD AUTO-RTO: 0 /100
PCO2 BLDA: 34 MM HG (ref 35–45)
PCO2 BLDA: 42 MM HG (ref 35–45)
PCO2 BLDA: 44 MM HG (ref 35–45)
PCO2 BLDA: 44 MM HG (ref 35–45)
PCO2 BLDV: 49 MM HG (ref 35–50)
PH BLDA: 7.28 [PH] (ref 7.37–7.44)
PH BLDA: 7.31 [PH] (ref 7.37–7.44)
PH BLDA: 7.35 [PH] (ref 7.37–7.44)
PH BLDA: 7.46 [PH] (ref 7.37–7.44)
PH BLDV: 7.32 [PH] (ref 7.35–7.45)
PH UR STRIP: 7 [PH] (ref 5–7)
PLATELET # BLD AUTO: 688 10E3/UL (ref 150–450)
PO2 BLDA: 382 MM HG (ref 75–85)
PO2 BLDA: 406 MM HG (ref 75–85)
PO2 BLDA: 437 MM HG (ref 75–85)
PO2 BLDA: >488 MM HG (ref 75–85)
PO2 BLDV: 52 MM HG (ref 25–47)
POTASSIUM BLD-SCNC: 3.4 MMOL/L (ref 3.5–5)
POTASSIUM BLD-SCNC: 3.9 MMOL/L (ref 3.5–5)
POTASSIUM BLD-SCNC: 4 MMOL/L (ref 3.5–5)
POTASSIUM BLD-SCNC: 4.4 MMOL/L (ref 3.5–5)
POTASSIUM BLD-SCNC: 5 MMOL/L (ref 3.5–5)
POTASSIUM BLD-SCNC: 5 MMOL/L (ref 3.5–5)
PROCALCITONIN SERPL-MCNC: 0.07 NG/ML (ref 0–0.49)
PROT SERPL-MCNC: 6.1 G/DL (ref 6–8)
RBC # BLD AUTO: 3.09 10E6/UL (ref 4.4–5.9)
RBC URINE: 8 /HPF
SARS-COV-2 RNA RESP QL NAA+PROBE: NEGATIVE
SATV LHE POCT: 84.7 % (ref 70–75)
SODIUM BLD-SCNC: 136 MMOL/L (ref 136–145)
SODIUM BLD-SCNC: 137 MMOL/L (ref 136–145)
SODIUM BLD-SCNC: 138 MMOL/L (ref 136–145)
SODIUM BLD-SCNC: 138 MMOL/L (ref 136–145)
SODIUM BLD-SCNC: 139 MMOL/L (ref 136–145)
SODIUM SERPL-SCNC: 131 MMOL/L (ref 136–145)
SP GR UR STRIP: 1.01 (ref 1–1.03)
TROPONIN I SERPL-MCNC: 0.03 NG/ML (ref 0–0.29)
UROBILINOGEN UR STRIP-MCNC: <2 MG/DL
WBC # BLD AUTO: 13.3 10E3/UL (ref 4–11)
WBC URINE: 0 /HPF

## 2022-09-13 PROCEDURE — 80053 COMPREHEN METABOLIC PANEL: CPT | Performed by: PHYSICIAN ASSISTANT

## 2022-09-13 PROCEDURE — 85610 PROTHROMBIN TIME: CPT | Performed by: PHYSICIAN ASSISTANT

## 2022-09-13 PROCEDURE — 99285 EMERGENCY DEPT VISIT HI MDM: CPT | Mod: 25

## 2022-09-13 PROCEDURE — 96374 THER/PROPH/DIAG INJ IV PUSH: CPT

## 2022-09-13 PROCEDURE — 250N000013 HC RX MED GY IP 250 OP 250 PS 637: Performed by: INTERNAL MEDICINE

## 2022-09-13 PROCEDURE — 96375 TX/PRO/DX INJ NEW DRUG ADDON: CPT

## 2022-09-13 PROCEDURE — 99223 1ST HOSP IP/OBS HIGH 75: CPT | Mod: AI | Performed by: INTERNAL MEDICINE

## 2022-09-13 PROCEDURE — 84484 ASSAY OF TROPONIN QUANT: CPT | Performed by: PHYSICIAN ASSISTANT

## 2022-09-13 PROCEDURE — 84484 ASSAY OF TROPONIN QUANT: CPT | Performed by: INTERNAL MEDICINE

## 2022-09-13 PROCEDURE — 250N000011 HC RX IP 250 OP 636: Performed by: PHYSICIAN ASSISTANT

## 2022-09-13 PROCEDURE — 83880 ASSAY OF NATRIURETIC PEPTIDE: CPT | Performed by: PHYSICIAN ASSISTANT

## 2022-09-13 PROCEDURE — U0003 INFECTIOUS AGENT DETECTION BY NUCLEIC ACID (DNA OR RNA); SEVERE ACUTE RESPIRATORY SYNDROME CORONAVIRUS 2 (SARS-COV-2) (CORONAVIRUS DISEASE [COVID-19]), AMPLIFIED PROBE TECHNIQUE, MAKING USE OF HIGH THROUGHPUT TECHNOLOGIES AS DESCRIBED BY CMS-2020-01-R: HCPCS | Performed by: PHYSICIAN ASSISTANT

## 2022-09-13 PROCEDURE — 250N000011 HC RX IP 250 OP 636: Performed by: INTERNAL MEDICINE

## 2022-09-13 PROCEDURE — 85025 COMPLETE CBC W/AUTO DIFF WBC: CPT | Performed by: PHYSICIAN ASSISTANT

## 2022-09-13 PROCEDURE — 82550 ASSAY OF CK (CPK): CPT | Performed by: INTERNAL MEDICINE

## 2022-09-13 PROCEDURE — C9803 HOPD COVID-19 SPEC COLLECT: HCPCS

## 2022-09-13 PROCEDURE — 36415 COLL VENOUS BLD VENIPUNCTURE: CPT | Performed by: PHYSICIAN ASSISTANT

## 2022-09-13 PROCEDURE — 258N000003 HC RX IP 258 OP 636: Performed by: INTERNAL MEDICINE

## 2022-09-13 PROCEDURE — 250N000011 HC RX IP 250 OP 636: Performed by: EMERGENCY MEDICINE

## 2022-09-13 PROCEDURE — 81001 URINALYSIS AUTO W/SCOPE: CPT | Performed by: PHYSICIAN ASSISTANT

## 2022-09-13 PROCEDURE — 93005 ELECTROCARDIOGRAM TRACING: CPT | Performed by: STUDENT IN AN ORGANIZED HEALTH CARE EDUCATION/TRAINING PROGRAM

## 2022-09-13 PROCEDURE — 210N000001 HC R&B IMCU HEART CARE

## 2022-09-13 PROCEDURE — 71046 X-RAY EXAM CHEST 2 VIEWS: CPT

## 2022-09-13 PROCEDURE — 71250 CT THORAX DX C-: CPT

## 2022-09-13 PROCEDURE — 36415 COLL VENOUS BLD VENIPUNCTURE: CPT | Performed by: INTERNAL MEDICINE

## 2022-09-13 PROCEDURE — 85730 THROMBOPLASTIN TIME PARTIAL: CPT | Performed by: PHYSICIAN ASSISTANT

## 2022-09-13 PROCEDURE — 70450 CT HEAD/BRAIN W/O DYE: CPT

## 2022-09-13 PROCEDURE — 84145 PROCALCITONIN (PCT): CPT | Performed by: PHYSICIAN ASSISTANT

## 2022-09-13 PROCEDURE — 83735 ASSAY OF MAGNESIUM: CPT | Performed by: PHYSICIAN ASSISTANT

## 2022-09-13 PROCEDURE — 258N000003 HC RX IP 258 OP 636: Performed by: EMERGENCY MEDICINE

## 2022-09-13 RX ORDER — CEFAZOLIN SODIUM 1 G/50ML
1750 SOLUTION INTRAVENOUS ONCE
Status: COMPLETED | OUTPATIENT
Start: 2022-09-13 | End: 2022-09-13

## 2022-09-13 RX ORDER — LIDOCAINE 40 MG/G
CREAM TOPICAL
Status: DISCONTINUED | OUTPATIENT
Start: 2022-09-13 | End: 2022-09-16 | Stop reason: HOSPADM

## 2022-09-13 RX ORDER — NITROGLYCERIN 0.4 MG/1
0.4 TABLET SUBLINGUAL EVERY 5 MIN PRN
Status: DISCONTINUED | OUTPATIENT
Start: 2022-09-13 | End: 2022-09-16 | Stop reason: HOSPADM

## 2022-09-13 RX ORDER — PIPERACILLIN SODIUM, TAZOBACTAM SODIUM 3; .375 G/15ML; G/15ML
3.38 INJECTION, POWDER, LYOPHILIZED, FOR SOLUTION INTRAVENOUS EVERY 8 HOURS
Status: DISCONTINUED | OUTPATIENT
Start: 2022-09-13 | End: 2022-09-14 | Stop reason: ALTCHOICE

## 2022-09-13 RX ORDER — LACTOBACILLUS RHAMNOSUS GG 10B CELL
1 CAPSULE ORAL DAILY
Status: DISCONTINUED | OUTPATIENT
Start: 2022-09-14 | End: 2022-09-16 | Stop reason: HOSPADM

## 2022-09-13 RX ORDER — NALOXONE HYDROCHLORIDE 0.4 MG/ML
0.2 INJECTION, SOLUTION INTRAMUSCULAR; INTRAVENOUS; SUBCUTANEOUS
Status: DISCONTINUED | OUTPATIENT
Start: 2022-09-13 | End: 2022-09-16 | Stop reason: HOSPADM

## 2022-09-13 RX ORDER — TAMSULOSIN HYDROCHLORIDE 0.4 MG/1
0.4 CAPSULE ORAL EVERY EVENING
Status: DISCONTINUED | OUTPATIENT
Start: 2022-09-13 | End: 2022-09-16 | Stop reason: HOSPADM

## 2022-09-13 RX ORDER — OXYCODONE HYDROCHLORIDE 5 MG/1
5 TABLET ORAL EVERY 4 HOURS PRN
Status: DISCONTINUED | OUTPATIENT
Start: 2022-09-13 | End: 2022-09-16 | Stop reason: HOSPADM

## 2022-09-13 RX ORDER — DEXTROSE MONOHYDRATE 25 G/50ML
25-50 INJECTION, SOLUTION INTRAVENOUS
Status: DISCONTINUED | OUTPATIENT
Start: 2022-09-13 | End: 2022-09-16 | Stop reason: HOSPADM

## 2022-09-13 RX ORDER — ACETAMINOPHEN 325 MG/1
650 TABLET ORAL EVERY 4 HOURS PRN
Status: DISCONTINUED | OUTPATIENT
Start: 2022-09-13 | End: 2022-09-16 | Stop reason: HOSPADM

## 2022-09-13 RX ORDER — GLIPIZIDE 5 MG/1
5 TABLET, FILM COATED, EXTENDED RELEASE ORAL
Status: DISCONTINUED | OUTPATIENT
Start: 2022-09-13 | End: 2022-09-16 | Stop reason: HOSPADM

## 2022-09-13 RX ORDER — PIPERACILLIN SODIUM, TAZOBACTAM SODIUM 3; .375 G/15ML; G/15ML
3.38 INJECTION, POWDER, LYOPHILIZED, FOR SOLUTION INTRAVENOUS ONCE
Status: COMPLETED | OUTPATIENT
Start: 2022-09-13 | End: 2022-09-13

## 2022-09-13 RX ORDER — SODIUM CHLORIDE 9 MG/ML
INJECTION, SOLUTION INTRAVENOUS CONTINUOUS
Status: DISCONTINUED | OUTPATIENT
Start: 2022-09-13 | End: 2022-09-14

## 2022-09-13 RX ORDER — AMIODARONE HYDROCHLORIDE 200 MG/1
200 TABLET ORAL 2 TIMES DAILY
Status: DISCONTINUED | OUTPATIENT
Start: 2022-09-13 | End: 2022-09-16 | Stop reason: HOSPADM

## 2022-09-13 RX ORDER — NICOTINE POLACRILEX 4 MG
15-30 LOZENGE BUCCAL
Status: DISCONTINUED | OUTPATIENT
Start: 2022-09-13 | End: 2022-09-16 | Stop reason: HOSPADM

## 2022-09-13 RX ORDER — FINASTERIDE 5 MG/1
5 TABLET, FILM COATED ORAL DAILY
Status: DISCONTINUED | OUTPATIENT
Start: 2022-09-14 | End: 2022-09-16 | Stop reason: HOSPADM

## 2022-09-13 RX ORDER — POTASSIUM CHLORIDE 750 MG/1
10 TABLET, EXTENDED RELEASE ORAL DAILY
Status: DISCONTINUED | OUTPATIENT
Start: 2022-09-14 | End: 2022-09-16 | Stop reason: HOSPADM

## 2022-09-13 RX ORDER — VITAMIN B COMPLEX
100 TABLET ORAL DAILY
Status: DISCONTINUED | OUTPATIENT
Start: 2022-09-14 | End: 2022-09-16 | Stop reason: HOSPADM

## 2022-09-13 RX ORDER — VANCOMYCIN HYDROCHLORIDE 1 G/200ML
1000 INJECTION, SOLUTION INTRAVENOUS EVERY 12 HOURS
Status: DISCONTINUED | OUTPATIENT
Start: 2022-09-14 | End: 2022-09-14 | Stop reason: ALTCHOICE

## 2022-09-13 RX ORDER — NALOXONE HYDROCHLORIDE 0.4 MG/ML
0.4 INJECTION, SOLUTION INTRAMUSCULAR; INTRAVENOUS; SUBCUTANEOUS
Status: DISCONTINUED | OUTPATIENT
Start: 2022-09-13 | End: 2022-09-16 | Stop reason: HOSPADM

## 2022-09-13 RX ORDER — ENOXAPARIN SODIUM 100 MG/ML
40 INJECTION SUBCUTANEOUS EVERY 24 HOURS
Status: DISCONTINUED | OUTPATIENT
Start: 2022-09-13 | End: 2022-09-16 | Stop reason: HOSPADM

## 2022-09-13 RX ORDER — ATORVASTATIN CALCIUM 40 MG/1
80 TABLET, FILM COATED ORAL DAILY
Status: DISCONTINUED | OUTPATIENT
Start: 2022-09-14 | End: 2022-09-16 | Stop reason: HOSPADM

## 2022-09-13 RX ADMIN — PIPERACILLIN AND TAZOBACTAM 3.38 G: 3; .375 INJECTION, POWDER, LYOPHILIZED, FOR SOLUTION INTRAVENOUS at 16:03

## 2022-09-13 RX ADMIN — ENOXAPARIN SODIUM 40 MG: 40 INJECTION SUBCUTANEOUS at 19:48

## 2022-09-13 RX ADMIN — SODIUM CHLORIDE: 9 INJECTION, SOLUTION INTRAVENOUS at 18:52

## 2022-09-13 RX ADMIN — TAMSULOSIN HYDROCHLORIDE 0.4 MG: 0.4 CAPSULE ORAL at 19:48

## 2022-09-13 RX ADMIN — GLIPIZIDE 5 MG: 5 TABLET, EXTENDED RELEASE ORAL at 19:49

## 2022-09-13 RX ADMIN — PIPERACILLIN AND TAZOBACTAM 3.38 G: 3; .375 INJECTION, POWDER, LYOPHILIZED, FOR SOLUTION INTRAVENOUS at 22:18

## 2022-09-13 RX ADMIN — AMIODARONE HYDROCHLORIDE 200 MG: 200 TABLET ORAL at 19:48

## 2022-09-13 RX ADMIN — METOPROLOL TARTRATE 37.5 MG: 25 TABLET, FILM COATED ORAL at 22:17

## 2022-09-13 RX ADMIN — VANCOMYCIN HYDROCHLORIDE 1750 MG: 5 INJECTION, POWDER, LYOPHILIZED, FOR SOLUTION INTRAVENOUS at 17:27

## 2022-09-13 ASSESSMENT — ACTIVITIES OF DAILY LIVING (ADL)
ADLS_ACUITY_SCORE: 35
ADLS_ACUITY_SCORE: 35
ADLS_ACUITY_SCORE: 39
ADLS_ACUITY_SCORE: 39
ADLS_ACUITY_SCORE: 35
ADLS_ACUITY_SCORE: 35

## 2022-09-13 NOTE — PROGRESS NOTES
Admitted from ER via stretcher. AAO x 4. No complaints. No distress. Vitals stable. Oriented to unit. Call light in reach. Placed on tele monitor.

## 2022-09-13 NOTE — PHARMACY-ADMISSION MEDICATION HISTORY
Pharmacy Note - Admission Medication History    Pertinent Provider Information: has one day remaining on furosemide & potassium Rx      ______________________________________________________________________    Prior To Admission (PTA) med list completed and updated in EMR.       PTA Med List   Medication Sig Last Dose     acetaminophen (TYLENOL) 325 MG tablet Take 2 tablets (650 mg) by mouth every 4 hours as needed for other, fever or headaches (For optimal non-opioid multimodal pain management to improve pain control.) Unknown at Unknown time     amiodarone (PACERONE) 200 MG tablet Take 1 tablet (200 mg) by mouth 2 times daily for 30 days 9/13/2022 at am     aspirin (ASA) 81 MG chewable tablet 2 tablets (162 mg) by Oral or NG Tube route daily 9/13/2022 at am     atorvastatin (LIPITOR) 80 MG tablet Take 1 tablet (80 mg) by mouth daily 9/13/2022 at am     finasteride (PROSCAR) 5 MG tablet Take 5 mg by mouth daily 9/13/2022 at am     FLUoxetine (PROZAC) 20 MG capsule Take 60 mg by mouth daily 9/13/2022 at am     furosemide (LASIX) 20 MG tablet Take 1 tablet (20 mg) by mouth daily for 7 days 9/13/2022 at am (has one more day remaining on Rx)     glipiZIDE (GLUCOTROL XL) 5 MG 24 hr tablet Take 5 mg by mouth 2 times daily (before meals) 9/13/2022 at am     Insulin Glargine-Lixisenatide (SOLIQUA SC) Inject 25 Units Subcutaneous daily 0-25 Units depending on blood sugar reading 9/13/2022 at BG 88 none needed     lactobacillus rhamnosus (GG) (CULTURELL) capsule Take 1 capsule by mouth daily 9/13/2022 at am     metFORMIN (GLUCOPHAGE) 1000 MG tablet [METFORMIN (GLUCOPHAGE) 1000 MG TABLET] Take 1,000 mg by mouth 2 (two) times a day with meals. 9/13/2022 at am     metoprolol tartrate 37.5 MG TABS Take 37.5 mg by mouth 2 times daily 9/13/2022 at am     nitroGLYcerin (NITROSTAT) 0.4 MG sublingual tablet For chest pain place 1 tablet under the tongue every 5 minutes for 3 doses. If symptoms persist 5 minutes after 1st dose call  911. Unknown at Unknown time     oxyCODONE (ROXICODONE) 5 MG tablet Take 1 tablet (5 mg) by mouth every 4 hours as needed for moderate to severe pain Unknown at Unknown time     potassium chloride ER (KLOR-CON M) 10 MEQ CR tablet Take 1 tablet (10 mEq) by mouth daily for 7 days 9/13/2022 at am (one more dose remaining on Rx)     tamsulosin (FLOMAX) 0.4 mg Cp24 Take 0.4 mg by mouth every evening 9/12/2022 at PM     vitamin D3 (CHOLECALCIFEROL) 50 mcg (2000 units) tablet Take 2 tablets by mouth daily 9/13/2022 at am       Information source(s): Patient, Family member and Hospital records  Method of interview communication: in-person    Summary of Changes to PTA Med List  No changes    Patient was asked about OTC/herbal products specifically.  PTA med list reflects this.    In the past week, patient estimated taking medication this percent of the time:  greater than 90%.    Allergies were reviewed, assessed, and updated with the patient.      Patient does not use any multi-dose medications prior to admission.    The information provided in this note is only as accurate as the sources available at the time of the update(s).    Thank you for the opportunity to participate in the care of this patient.    Brooke Amaya ADRIAN  9/13/2022 12:44 PM

## 2022-09-13 NOTE — TELEPHONE ENCOUNTER
Patient called due to issues with persistent dizziness. He had a fall while going to the bathroom in the early morning of 9/12. He did not lose consciousness and stated that he only slightly pumped his head. He had no neurological changes per the patients wife. He had one episode of emesis yesterday afternoon and his dizziness has been getting progressively worse and he feels chest heaviness/heart pounding. His HR has been in the 60's and his BP's have been between 111//76. They have not had any abnormal readings. He has to take multiple breaks while attempting to go to the bathroom due to dizziness. Encouraged patient to go to urgent care or emergency room for further evaluation. Patient and spouse in agreement.

## 2022-09-13 NOTE — ED PROVIDER NOTES
"Emergency Department Midlevel Supervisory Note     I personally saw the patient and performed a substantive portion of the visit including all aspects of the medical decision making.    ED Course:  11:58 AM Adeline Rosa PA-C, staffed patient with me. I agree with their assessment and plan of management, and I will see the patient.  12:56 PM I met with the patient to introduce myself, gather additional history, perform my initial exam, and discuss the plan.     Brief HPI:     Bala Hopper is a 71 year old male who presents for evaluation of dehydration, shortness of breath, generalized weakness.    Patient had bypass surgery 1 week ago. Patient reports 3 days of worsening light headedness and shortness of breath with exertion. He gets short of breath walking to the bathroom. He fell yesterday at 1 AM in the bathroom, falling backward after becoming disoriented. Wife believes patient is eating well.     Patient denies chest pain, headache, and all other relevant symptoms.    I, Brad Leo, am serving as a scribe to document services personally performed by Carolyn Brooks MD, based on my observations and the provider's statements to me.   I, Carolyn Brooks MD attest that Brad Leo was acting in a scribe capacity, has observed my performance of the services and has documented them in accordance with my direction.    Brief Physical Exam: /64   Pulse 91   Temp 97.2  F (36.2  C) (Tympanic)   Resp 19   Ht 1.88 m (6' 2\")   Wt 90.7 kg (200 lb)   SpO2 94%   BMI 25.68 kg/m    Constitutional:  Alert, in no acute distress  EYES: Conjunctivae clear  HENT:  Atraumatic, normocephalic  Respiratory:  Respirations even, unlabored, in no acute respiratory distress  Cardiovascular:  Regular rate and rhythm, good peripheral perfusion  GI: Soft, nondistended, nontender, no palpable masses, no rebound, no guarding   Musculoskeletal:  No edema. No cyanosis. Range of motion major extremities intact.    Integument: " Warm, Dry, No erythema, No rash. Well healing anterior chest wall incision. 3 laparoscopic incisions to upper abdomen that are slightly open.  Neurologic:  Alert & oriented, no focal deficits noted  Psych: Normal mood and affect     MDM:  Given patient's recent surgery and new symptoms of lightheadedness and dyspnea on exertion over the last 3 days, he will require admission for further cardiac evaluation post operatively.       Labs and Imaging:  Results for orders placed or performed during the hospital encounter of 09/13/22   CT Head w/o Contrast    Impression    IMPRESSION:  1.  No acute intracranial abnormality.    2.  Moderate to marked prominence of the bilateral lateral ventricles and third ventricle appears disproportionate to the background mild diffuse parenchymal volume loss. In the appropriate clinical setting, a component of normal pressure   hydrocephalus/communicating hydrocephalus should be considered.   XR Chest 2 Views    Impression    IMPRESSION: The heart is normal in size. Left basilar atelectasis and or infiltrate and small left pleural effusion are identified. Median sternotomy wires are identified. No pneumothorax.   Comprehensive metabolic panel   Result Value Ref Range    Sodium 131 (L) 136 - 145 mmol/L    Potassium 4.4 3.5 - 5.0 mmol/L    Chloride 100 98 - 107 mmol/L    Carbon Dioxide (CO2) 20 (L) 22 - 31 mmol/L    Anion Gap 11 5 - 18 mmol/L    Urea Nitrogen 19 8 - 28 mg/dL    Creatinine 0.87 0.70 - 1.30 mg/dL    Calcium 8.6 8.5 - 10.5 mg/dL    Glucose 98 70 - 125 mg/dL    Alkaline Phosphatase 110 45 - 120 U/L    AST 21 0 - 40 U/L    ALT 32 0 - 45 U/L    Protein Total 6.1 6.0 - 8.0 g/dL    Albumin 2.9 (L) 3.5 - 5.0 g/dL    Bilirubin Total 0.6 0.0 - 1.0 mg/dL    GFR Estimate >90 >60 mL/min/1.73m2   Result Value Ref Range    INR 1.01 0.85 - 1.15   Partial thromboplastin time   Result Value Ref Range    aPTT 27 22 - 38 Seconds   Result Value Ref Range    Troponin I 0.03 0.00 - 0.29 ng/mL    UA with Microscopic reflex to Culture    Specimen: Urine, Clean Catch   Result Value Ref Range    Color Urine Light Yellow Colorless, Straw, Light Yellow, Yellow    Appearance Urine Clear Clear    Glucose Urine Negative Negative mg/dL    Bilirubin Urine Negative Negative    Ketones Urine Negative Negative mg/dL    Specific Gravity Urine 1.012 1.001 - 1.030    Blood Urine 0.03 mg/dL (A) Negative    pH Urine 7.0 5.0 - 7.0    Protein Albumin Urine Negative Negative mg/dL    Urobilinogen Urine <2.0 <2.0 mg/dL    Nitrite Urine Negative Negative    Leukocyte Esterase Urine Negative Negative    Mucus Urine Present (A) None Seen /LPF    RBC Urine 8 (H) <=2 /HPF    WBC Urine 0 <=5 /HPF   Result Value Ref Range    Magnesium 2.0 1.8 - 2.6 mg/dL   B-Type Natriuretic Peptide ([facility] Only)   Result Value Ref Range     (H) 0 - 69 pg/mL   CBC with platelets and differential   Result Value Ref Range    WBC Count 13.3 (H) 4.0 - 11.0 10e3/uL    RBC Count 3.09 (L) 4.40 - 5.90 10e6/uL    Hemoglobin 9.7 (L) 13.3 - 17.7 g/dL    Hematocrit 28.8 (L) 40.0 - 53.0 %    MCV 93 78 - 100 fL    MCH 31.4 26.5 - 33.0 pg    MCHC 33.7 31.5 - 36.5 g/dL    RDW 13.7 10.0 - 15.0 %    Platelet Count 688 (H) 150 - 450 10e3/uL    % Neutrophils 75 %    % Lymphocytes 14 %    % Monocytes 8 %    % Eosinophils 1 %    % Basophils 0 %    % Immature Granulocytes 2 %    NRBCs per 100 WBC 0 <1 /100    Absolute Neutrophils 10.3 (H) 1.6 - 8.3 10e3/uL    Absolute Lymphocytes 1.9 0.8 - 5.3 10e3/uL    Absolute Monocytes 1.0 0.0 - 1.3 10e3/uL    Absolute Eosinophils 0.1 0.0 - 0.7 10e3/uL    Absolute Basophils 0.0 0.0 - 0.2 10e3/uL    Absolute Immature Granulocytes 0.3 <=0.4 10e3/uL    Absolute NRBCs 0.0 10e3/uL   Extra Red Top Tube   Result Value Ref Range    Hold Specimen JIC    Extra Green Top (Lithium Heparin) Tube   Result Value Ref Range    Hold Specimen JIC      I have reviewed the relevant laboratory and radiology studies        Carolyn Brooks MD  M  [facility] EMERGENCY DEPARTMENT  [address] [place] [id number]  [phone]      Carolyn Brooks MD  09/13/22 3731

## 2022-09-13 NOTE — ED PROVIDER NOTES
ED PROVIDER NOTE    EMERGENCY DEPARTMENT ENCOUNTER      NAME: Bala Hopper  AGE: 71 year old male  YOB: 1950  MRN: [id number]  EVALUATION DATE & TIME: 9/13/2022 11:24 AM    PCP: Misha Dawson    ED PROVIDER: Adeline Rosa PA-C      Chief Complaint   Patient presents with     Dehydration     Generalized Weakness     Shortness of Breath         FINAL IMPRESSION:  1. Pneumonia    2. Fatigue    3. Near syncope    4. CAD (coronary artery disease)          MEDICAL DECISION MAKING:    Pertinent Labs & Imaging studies reviewed. (See chart for details)    71 year old male with a h/o recent CABGx2 on 9/2/22 w/ Dr. Bradshaw at [facility] as well as PMH of DM, HLD, DANIELLE presents to the Emergency Department for evaluation of weakness, fatigue, shortness of breath and concerns for dehydration.  He reports feeling well when he was discharged from the hospital after his CABG surgery.  Approximately 4 days ago he started feeling more tired and weak.  He had 1 episode of emesis a few days ago, since this time has had decreased appetite but trying to stay up on fluid intake.   he did have a fall yesterday morning due to profound weakness, there was no pain, palpitations or syncope prior to the fall.  He denies any headache or increased confusion afterwards.  He is currently on 162 mg of aspirin, no other blood thinning medication.    On arrival here his vitals are stable and he is afebrile.  Clinically he appears pale and somewhat dyspneic with increased conversation.  His lungs however are clear.  His sternal incision is healing well.  No lower extremity swelling.  No C-spine tenderness.  Neurologically intact.    Differential broad including dehydration, metabolic imbalance, pneumonia, cardiac tamponade, ACS.  Initiated work-up with labs, EKG, chest x-ray.  Also obtained a head CT given his head trauma although low suspicion for any significant injury given timing since his fall and  presentation.    His initial lab work-up was overall unremarkable.  He has a slight leukocytosis of 13.3. Plt elevated at 688. Hgb stable 9.7. Troponin negative.  BNP just slightly elevated at 190.  No signs of UTI.    CT head negative. CXR revealed left basilar atelectasis and/or infiltrate with a small left pleural effusion.  The patient states he has had just a mild cough mostly in the mornings, no fevers.  I added on a procalcitonin.      In the setting of his recent cardiac surgery and progressing weakness, fatigue and near syncopal episode, do feel he warrants admission to the hospital for ongoing monitoring.  I discussed the case with both cardiology and cardiothoracic surgery.  No further recommendations from their standpoint at this time.    Discussed admission with the hospitalist.  Plan to proceed with further imaging of the chest (CT chest) to evaluate the possible infiltrate and we will cover him with Vanco and Zosyn just given high risk patient in the setting of possible pneumonia. Will be admitted to cardiac telemetry    At the conclusion of the encounter I discussed the results of all of the tests and the disposition. The questions were answered. The patient or family acknowledged understanding and was agreeable with the care plan.         ED COURSE  11:52 AM Met and evaluated patient. Discussed ED plan.   11:55 AM I staffed the patient with Dr. Brooks.  2:34 PM I rechecked on the patient.   2:54 PM I spoke with Dr. Pruitt.   3:13 PM I spoke with a hospitalist, Dr. Bethea.  3:23 PM I spoke with cardiothoracic surgery, Dr. Allen.   3:25 PM Updated patient and wife on plan and admission    MEDICATIONS GIVEN IN THE EMERGENCY:  Medications   vancomycin (VANCOCIN) 1,750 mg in sodium chloride 0.9 % 500 mL intermittent infusion (has no administration in time range)   piperacillin-tazobactam (ZOSYN) 3.375 g vial to attach to  mL bag (3.375 g Intravenous Given 9/13/22 7132)       NEW  PRESCRIPTIONS STARTED AT TODAY'S ER VISIT  New Prescriptions    No medications on file          =================================================================    HPI    Patient information was obtained from: Patient and patient's wife    Use of Intrepreter: N/A        Bala Hopper is a 71 year old male with pertinent medical history of CAD s/p coronary artery bypass graft x2 on 9/2/22, cerebral infarction, diabetes mellitus, DANIELLE, who presents for evaluation of generalized weakness and shortness of breath.    Patient reports increasing weakness, shortness of breath, and dizziness over the past 3-4 days.  Felt well initially upon discharge after his bypass surgery.  Yesterday he reports feeling so weak he fell and hit head on the floor, denies loss of consciousness. Endorses bruising to his left calf but has been walking on the leg without pain. Endorses one large episode of emesis 3 days ago. Endorses loss of appetite over the past few days. Reports he currently takes 2 baby aspirins a day. Currently endorses palpitations. Denies smoking or alcohol use. Denies chest pain, abdominal pain, back pain, headache or any other medical complaint at this time.       REVIEW OF SYSTEMS   Constitutional: Negative for fevers, chills.  Vision: Negative for vision changes  HENT:  Negative for congestion, sore throat  Pulmonary: Positive for shortness of breath  Cardiac: Negative for chest pain. Positive for palpitations.  GI:Negative for diarrhea, abdominal pain. Positive for emesis  Musculoskeletal: Negative for extremity pain  Neuro: Positive for weakness    All other systems reviewed and are negative        PAST MEDICAL HISTORY:  Past Medical History:   Diagnosis Date     BPH (benign prostatic hyperplasia)      Cerebral infarction (H)      Depression      Diabetes (H)      Diabetes mellitus (H)      Hyperlipidemia LDL goal <100      DANIELLE (obstructive sleep apnea)      Tardive dyskinesia 2019       PAST SURGICAL  HISTORY:  Past Surgical History:   Procedure Laterality Date     BYPASS GRAFT ARTERY CORONARY N/A 9/2/2022    Procedure: CORONARY ARTERY BYPASS GRAFT (CABG)X2 LEFT LEG ENDOSCOPIC SAPHENOUS VESSEL PROCUREMENT, LEFT INTERNAL MAMMARY ARTERY HARVEST, ANESTHESIA TRANSESOPHAGEAL ECHOCARDIOGRAM;EPI AORTIC ULTRASOUND;  Surgeon: Carline Bradshaw MD;  Location: [facility] Main OR     CV CORONARY ANGIOGRAM N/A 8/10/2022    Procedure: Coronary Angiogram;  Surgeon: Annette Leavitt MD;  Location: [facility] CATH LAB CV     CV LEFT HEART CATH N/A 8/10/2022    Procedure: Left Heart Catheterization;  Surgeon: Annette Leavitt MD;  Location: [facility] CATH LAB CV     tendon right wrist Right            CURRENT MEDICATIONS:    No current facility-administered medications for this encounter.    Current Outpatient Medications:      acetaminophen (TYLENOL) 325 MG tablet, Take 2 tablets (650 mg) by mouth every 4 hours as needed for other, fever or headaches (For optimal non-opioid multimodal pain management to improve pain control.), Disp: 30 tablet, Rfl: 0     amiodarone (PACERONE) 200 MG tablet, Take 1 tablet (200 mg) by mouth 2 times daily for 30 days, Disp: 60 tablet, Rfl: 0     aspirin (ASA) 81 MG chewable tablet, 2 tablets (162 mg) by Oral or NG Tube route daily, Disp: 60 tablet, Rfl: 3     atorvastatin (LIPITOR) 80 MG tablet, Take 1 tablet (80 mg) by mouth daily, Disp: 90 tablet, Rfl: 3     BD PEN NEEDLE MICRO U/F 32G X 6 MM miscellaneous, 1 4 TIMES DAILY AS DIRECTED AS NEEDED E11.42, Disp: , Rfl:      finasteride (PROSCAR) 5 MG tablet, Take 5 mg by mouth daily, Disp: , Rfl:      FLUoxetine (PROZAC) 20 MG capsule, TAKE 3 CAPSULES BY MOUTH IN THE MORNING, Disp: , Rfl:      furosemide (LASIX) 20 MG tablet, Take 1 tablet (20 mg) by mouth daily for 7 days, Disp: 7 tablet, Rfl: 0     glipiZIDE (GLUCOTROL XL) 5 MG 24 hr tablet, Take 5 mg by mouth 2 times daily (before meals), Disp: , Rfl:      Insulin Glargine-Lixisenatide (SOLIQUA SC),  "Inject 25 Units Subcutaneous daily 0-25 Units depending on blood sugar reading, Disp: , Rfl:      insulin pen needle (32G X 4 MM) 32G X 4 MM miscellaneous, Use pen needles as directed., Disp: 100 each, Rfl: 0     lactobacillus rhamnosus, GG, (CULTURELL) capsule, Take 1 capsule by mouth daily, Disp: , Rfl:      Lancets (ONETOUCH DELICA PLUS [id number]) MISC, See Admin Instructions, Disp: , Rfl:      metFORMIN (GLUCOPHAGE) 1000 MG tablet, [METFORMIN (GLUCOPHAGE) 1000 MG TABLET] Take 1,000 mg by mouth 2 (two) times a day with meals., Disp: , Rfl:      metoprolol tartrate 37.5 MG TABS, Take 37.5 mg by mouth 2 times daily, Disp: 60 tablet, Rfl: 2     nitroGLYcerin (NITROSTAT) 0.4 MG sublingual tablet, For chest pain place 1 tablet under the tongue every 5 minutes for 3 doses. If symptoms persist 5 minutes after 1st dose call 911., Disp: 25 tablet, Rfl: 1     oxyCODONE (ROXICODONE) 5 MG tablet, Take 1 tablet (5 mg) by mouth every 4 hours as needed for moderate to severe pain, Disp: 16 tablet, Rfl: 0     potassium chloride ER (KLOR-CON M) 10 MEQ CR tablet, Take 1 tablet (10 mEq) by mouth daily for 7 days, Disp: 7 tablet, Rfl: 0     tamsulosin (FLOMAX) 0.4 mg Cp24, Take 0.4 mg by mouth every evening, Disp: , Rfl:      vitamin D3 (CHOLECALCIFEROL) 50 mcg (2000 units) tablet, Take 2 tablets by mouth daily, Disp: , Rfl:     ALLERGIES:  No Known Allergies    FAMILY HISTORY:  No family history on file.    SOCIAL HISTORY:   Illicit drug: None    Other:     VITALS:  Vitals:    09/13/22 1118   BP: 135/64   Pulse: 85   Resp: 20   Temp: 97.2  F (36.2  C)   TempSrc: Tympanic   SpO2: 96%   Weight: 90.7 kg (200 lb)   Height: 1.88 m (6' 2\")       PHYSICAL EXAM    General Appearance:  Alert, cooperative, no distress, appears stated age. Appears generally weak.  HENT: Normocephalic without obvious deformity, atraumatic. Mucous membranes moist   Eyes: Conjunctiva clear, Lids normal. No discharge.   Respiratory: Occasional dyspnea with " prolonged conversation. No wheezing or crackles appreciated  Cardiovascular: Regular rate and rhythm. Healing sternal incision w/o signs of infection.   GI: Abdomen soft, nontender, normal bowel sounds  : No CVA tenderness  Musculoskeletal: Moving all extremities. No gross deformities. Bruising over the medial aspect of left lower leg and ankle but no significant tenderness. No calf swelling.    Integument: Warm, dry. Pale appearing  Neurologic: Alert and orientated x3. No focal deficits.  Psych: Normal mood and affect        LAB:  Labs Ordered and Resulted from Time of ED Arrival to Time of ED Departure   COMPREHENSIVE METABOLIC PANEL - Abnormal       Result Value    Sodium 131 (*)     Potassium 4.4      Chloride 100      Carbon Dioxide (CO2) 20 (*)     Anion Gap 11      Urea Nitrogen 19      Creatinine 0.87      Calcium 8.6      Glucose 98      Alkaline Phosphatase 110      AST 21      ALT 32      Protein Total 6.1      Albumin 2.9 (*)     Bilirubin Total 0.6      GFR Estimate >90     ROUTINE UA WITH MICROSCOPIC REFLEX TO CULTURE - Abnormal    Color Urine Light Yellow      Appearance Urine Clear      Glucose Urine Negative      Bilirubin Urine Negative      Ketones Urine Negative      Specific Gravity Urine 1.012      Blood Urine 0.03 mg/dL (*)     pH Urine 7.0      Protein Albumin Urine Negative      Urobilinogen Urine <2.0      Nitrite Urine Negative      Leukocyte Esterase Urine Negative      Mucus Urine Present (*)     RBC Urine 8 (*)     WBC Urine 0     B-TYPE NATRIURETIC PEPTIDE ([facility] ONLY) - Abnormal     (*)    CBC WITH PLATELETS AND DIFFERENTIAL - Abnormal    WBC Count 13.3 (*)     RBC Count 3.09 (*)     Hemoglobin 9.7 (*)     Hematocrit 28.8 (*)     MCV 93      MCH 31.4      MCHC 33.7      RDW 13.7      Platelet Count 688 (*)     % Neutrophils 75      % Lymphocytes 14      % Monocytes 8      % Eosinophils 1      % Basophils 0      % Immature Granulocytes 2      NRBCs per 100 WBC 0       Absolute Neutrophils 10.3 (*)     Absolute Lymphocytes 1.9      Absolute Monocytes 1.0      Absolute Eosinophils 0.1      Absolute Basophils 0.0      Absolute Immature Granulocytes 0.3      Absolute NRBCs 0.0     INR - Normal    INR 1.01     PARTIAL THROMBOPLASTIN TIME - Normal    aPTT 27     TROPONIN I - Normal    Troponin I 0.03     MAGNESIUM - Normal    Magnesium 2.0     PROCALCITONIN   COVID-19 VIRUS (CORONAVIRUS) BY PCR       RADIOLOGY:  XR Chest 2 Views   Final Result   IMPRESSION: The heart is normal in size. Left basilar atelectasis and or infiltrate and small left pleural effusion are identified. Median sternotomy wires are identified. No pneumothorax.      CT Head w/o Contrast   Final Result   IMPRESSION:   1.  No acute intracranial abnormality.      2.  Moderate to marked prominence of the bilateral lateral ventricles and third ventricle appears disproportionate to the background mild diffuse parenchymal volume loss. In the appropriate clinical setting, a component of normal pressure    hydrocephalus/communicating hydrocephalus should be considered.      Chest CT w/o contrast    (Results Pending)       EKG:    Performed at: 11:30 AM  Impression: Sinus rhythm. Inferior infarct, age undetermined. Cannot rule out anterior infarct, age undetermined. Abnormal ECG. When compared with ECG of 9/3/22, previous ECG has undetermined rhythm, needs review. Inferior infarct is now present. Non-specific change in ST segment in Inferior leads. ST no longer elevated in lateral leads. Nonspecific T wave abnormality now evident in lateral leads. No STEMI.  Dr. Brooks and I have independently reviewed and interpreted the EKG(s) documented above.        I, Abdoulaye Mares, am serving as a scribe to document services personally performed by Adeline Rosa PA-C based on my observation and the provider's statements to me. I, Adeline Rosa PA-C attest that Abdoulaye Mares is acting in a scribe capacity, has observed my performance  of the services and has documented them in accordance with my direction.    Adeline Rosa PA-C   Emergency Medicine       Adeline Rosa PA-C  09/13/22 7110

## 2022-09-13 NOTE — H&P
ADMISSION HISTORY & PHYSICAL    CODE STATUS:  Prior    Misha Dawson, [phone]    Patient YOB: 1950  Admit date: 9/13/2022  Date of Service: 9/13/2022    ASSESSMENT AND PLAN:  71 year old male with PMH of DM-2, hyperlipidemia, CAD, s/p CABG x 2 on 9/2/22, DANIELLE and BPH who presented to our ED for evaluation of fatigue, weakness, shortness of breath and fall.     Fatigue, weakness and shortness of breath  -- ?LLL pneumonia. Chest xray shows LLL infiltrate and/or atelectasis. Given recent hospitalization,will treat as HAP for now.  -- Get CT chest, procal. Elevated white count noted.   -- Empiric vanc/zosyn for now. Consider stopping the antibiotics if furtherwork up for pneumonia is negative  -- PT/OT eval    Fall:  -- Due to weakness.   -- CT head- no acute intracranial processes. The ventricles are disproportionally enlarged raising the suspicion of NPH  -- Outpatient work up recommended    CAD   S/P recent CABG x 2  -- Denies any new chest pain. Trop is negative. EKG shows NSR with inferior infarct, age undetermined. Trend trops  -- Tiny left effusion is likely common after the CABG.   -- Echo from 8/24/22 with normal EF of 55-60%. Clinically doesn't appear vol up. BNP is 190. No prior BNP for comparison  -- Cont with home meds- BB, statin, aspirin and amiodarone. Will tag CTS since he is only 11 days out from CABG    DM-2:  -- Last A1c  6.1 as of 8/26/22  -- Hold metformin. Cont glipizide and lantus. Add sliding scale insulin    Hyperlipidemia:  -- Cont statin as above. Check total CK    BPH:  -- Cont with home meds    Hyponatremia:  -- Sodium of 131 noted. It was normal at the time of discharge  -- Likely intravascular vol depletion. Hold lasix. Gentle hydration. Trend labs    Disposition:  -Anticipated Length of Stay in midnights and medical necessity (including a midnight in the Emergency Department after triage if applicable): >2      CHIEF COMPLAINT:  Generalized weakness, fatigue and  shortness of breath    HISTORY OF PRESENTING ILLNESS:  71 year old male with PMH of DM-2, hyperlipidemia, CAD, s/p CABG x 2 on 9/2/22, DANIELLE and BPH who presented to our ED for evaluation of fatigue, weakness, shortness of breath and fall.     Patient was in our hospital from 9/2/22 to 9/7/22 for CABG x 2. He reports doing well until about 4 days ago when he started to notice increasing fatigue, weakness and shortness of breath with exertion. He endorses having some non productive cough. He denies any chest pain, palpitation, orthopnea or PND. He states he had an episode of vomiting on Saturday (4 days ago). No diarrhea or abdominal pain. Denies having any fevers, chills. He reports he feel yesterday hitting his head on the floor. No loss of consciousness reported.     PMH/PSH:  Patient Active Problem List   Diagnosis     WATTERS (dyspnea on exertion)     Coronary artery disease involving native heart, unspecified vessel or lesion type, unspecified whether angina present       Past Medical History:   Diagnosis Date     BPH (benign prostatic hyperplasia)      Cerebral infarction (H)      Depression      Diabetes (H)      Diabetes mellitus (H)      Hyperlipidemia LDL goal <100      DANIELLE (obstructive sleep apnea)      Tardive dyskinesia 2019        Past Surgical History:   Procedure Laterality Date     BYPASS GRAFT ARTERY CORONARY N/A 9/2/2022    Procedure: CORONARY ARTERY BYPASS GRAFT (CABG)X2 LEFT LEG ENDOSCOPIC SAPHENOUS VESSEL PROCUREMENT, LEFT INTERNAL MAMMARY ARTERY HARVEST, ANESTHESIA TRANSESOPHAGEAL ECHOCARDIOGRAM;EPI AORTIC ULTRASOUND;  Surgeon: Carline Bradshaw MD;  Location: [facility] Main OR     CV CORONARY ANGIOGRAM N/A 8/10/2022    Procedure: Coronary Angiogram;  Surgeon: Annette Leavitt MD;  Location: [facility] CATH LAB CV     CV LEFT HEART CATH N/A 8/10/2022    Procedure: Left Heart Catheterization;  Surgeon: Annette Leavitt MD;  Location: [facility] CATH LAB CV     tendon right wrist Right            ALLERGIES:  No Known Allergies    MEDICATIONS:  Reviewed.  No current facility-administered medications for this encounter.     Current Outpatient Medications   Medication     acetaminophen (TYLENOL) 325 MG tablet     amiodarone (PACERONE) 200 MG tablet     aspirin (ASA) 81 MG chewable tablet     atorvastatin (LIPITOR) 80 MG tablet     finasteride (PROSCAR) 5 MG tablet     FLUoxetine (PROZAC) 20 MG capsule     furosemide (LASIX) 20 MG tablet     glipiZIDE (GLUCOTROL XL) 5 MG 24 hr tablet     Insulin Glargine-Lixisenatide (SOLIQUA SC)     lactobacillus rhamnosus (GG) (CULTURELL) capsule     metFORMIN (GLUCOPHAGE) 1000 MG tablet     metoprolol tartrate 37.5 MG TABS     nitroGLYcerin (NITROSTAT) 0.4 MG sublingual tablet     oxyCODONE (ROXICODONE) 5 MG tablet     potassium chloride ER (KLOR-CON M) 10 MEQ CR tablet     tamsulosin (FLOMAX) 0.4 mg Cp24     vitamin D3 (CHOLECALCIFEROL) 50 mcg (2000 units) tablet     BD PEN NEEDLE MICRO U/F 32G X 6 MM miscellaneous     insulin pen needle (32G X 4 MM) 32G X 4 MM miscellaneous     Lancets (ONETOUCH DELICA PLUS [id number]) MISC     No current facility-administered medications for this encounter.    Current Outpatient Medications:      acetaminophen (TYLENOL) 325 MG tablet, Take 2 tablets (650 mg) by mouth every 4 hours as needed for other, fever or headaches (For optimal non-opioid multimodal pain management to improve pain control.), Disp: 30 tablet, Rfl: 0     amiodarone (PACERONE) 200 MG tablet, Take 1 tablet (200 mg) by mouth 2 times daily for 30 days, Disp: 60 tablet, Rfl: 0     aspirin (ASA) 81 MG chewable tablet, 2 tablets (162 mg) by Oral or NG Tube route daily, Disp: 60 tablet, Rfl: 3     atorvastatin (LIPITOR) 80 MG tablet, Take 1 tablet (80 mg) by mouth daily, Disp: 90 tablet, Rfl: 3     finasteride (PROSCAR) 5 MG tablet, Take 5 mg by mouth daily, Disp: , Rfl:      FLUoxetine (PROZAC) 20 MG capsule, Take 60 mg by mouth daily, Disp: , Rfl:      furosemide (LASIX)  20 MG tablet, Take 1 tablet (20 mg) by mouth daily for 7 days, Disp: 7 tablet, Rfl: 0     glipiZIDE (GLUCOTROL XL) 5 MG 24 hr tablet, Take 5 mg by mouth 2 times daily (before meals), Disp: , Rfl:      Insulin Glargine-Lixisenatide (SOLIQUA SC), Inject 25 Units Subcutaneous daily 0-25 Units depending on blood sugar reading, Disp: , Rfl:      lactobacillus rhamnosus (GG) (CULTURELL) capsule, Take 1 capsule by mouth daily, Disp: , Rfl:      metFORMIN (GLUCOPHAGE) 1000 MG tablet, [METFORMIN (GLUCOPHAGE) 1000 MG TABLET] Take 1,000 mg by mouth 2 (two) times a day with meals., Disp: , Rfl:      metoprolol tartrate 37.5 MG TABS, Take 37.5 mg by mouth 2 times daily, Disp: 60 tablet, Rfl: 2     nitroGLYcerin (NITROSTAT) 0.4 MG sublingual tablet, For chest pain place 1 tablet under the tongue every 5 minutes for 3 doses. If symptoms persist 5 minutes after 1st dose call 911., Disp: 25 tablet, Rfl: 1     oxyCODONE (ROXICODONE) 5 MG tablet, Take 1 tablet (5 mg) by mouth every 4 hours as needed for moderate to severe pain, Disp: 16 tablet, Rfl: 0     potassium chloride ER (KLOR-CON M) 10 MEQ CR tablet, Take 1 tablet (10 mEq) by mouth daily for 7 days, Disp: 7 tablet, Rfl: 0     tamsulosin (FLOMAX) 0.4 mg Cp24, Take 0.4 mg by mouth every evening, Disp: , Rfl:      vitamin D3 (CHOLECALCIFEROL) 50 mcg (2000 units) tablet, Take 2 tablets by mouth daily, Disp: , Rfl:      BD PEN NEEDLE MICRO U/F 32G X 6 MM miscellaneous, 1 4 TIMES DAILY AS DIRECTED AS NEEDED E11.42, Disp: , Rfl:      insulin pen needle (32G X 4 MM) 32G X 4 MM miscellaneous, Use pen needles as directed., Disp: 100 each, Rfl: 0     Lancets (ONETOUCH DELICA PLUS [id number]) MISC, See Admin Instructions, Disp: , Rfl:    Scheduled Meds:  Continuous Infusions:  PRN Meds:.    SOCIAL HISTORY:  Social History     Socioeconomic History     Marital status:      Spouse name: Not on file     Number of children: Not on file     Years of education: Not on file     Highest  "education level: Not on file   Occupational History     Not on file   Tobacco Use     Smoking status: Never Smoker     Smokeless tobacco: Never Used   Vaping Use     Vaping Use: Never used   Substance and Sexual Activity     Alcohol use: Not Currently     Drug use: Never     Sexual activity: Not on file   Other Topics Concern     Parent/sibling w/ CABG, MI or angioplasty before 65F 55M? Not Asked   Social History Narrative     Not on file     Social Determinants of Health     Financial Resource Strain: Not on file   Food Insecurity: Not on file   Transportation Needs: Not on file   Physical Activity: Not on file   Stress: Not on file   Social Connections: Not on file   Intimate Partner Violence: Not on file   Housing Stability: Not on file       FAMILY HISTORY:  Reviewed and is not pertinent to this admission.    ROS:  12 point review of systems reviewed and is negative except for what has already been mentioned in HPI.       PHYSICAL EXAM:  GENRL:  Not in acute distress   /64   Pulse 91   Temp 97.2  F (36.2  C) (Tympanic)   Resp 19   Ht 1.88 m (6' 2\")   Wt 90.7 kg (200 lb)   SpO2 94%   BMI 25.68 kg/m    No intake/output data recorded.  No intake/output data recorded.  HEENT: NC/AT      Eyes-  Pupil round and reactive to light bilaterally       Neck- supple, no JVP elevation,       Sclera- anicteric      Oropharynx- moist and pink  CHEST: Diminished breath sounds LL area.  HEART: S1S2 normal, regular. Healing midsternal incisional wound.   ABDMN: Soft. Non-tender, non-distended.  No guarding or rigidity. Bowel sounds- active  EXTRM: No pedal edema   INTGM: No skin rash, no cyanosis or clubbing  NEURO: Alert and awake. Cranial nerves II-XII grossly intact. No focal neurological deficit.        DIAGNOSTIC DATA:    Recent Labs   Lab 09/13/22  1243   WBC 13.3*   HGB 9.7*   HCT 28.8*   *       Recent Labs   Lab 09/13/22  1243 09/07/22  0441   *  --    CO2 20*  --    BUN 19  --    PHOS  --  2.2* "       Recent Labs   Lab 22  1243   INR 1.01       Recent Labs   Lab 22  1243   *   CO2 20*   BUN 19   ALBUMIN 2.9*   ALKPHOS 110   ALT 32   AST 21       [unfilled]    XR Chest 2 Views    Result Date: 2022  EXAM: XR CHEST 2 VIEWS LOCATION: [facility] DATE/TIME: 2022 2:11 PM INDICATION: shortness of breath COMPARISON: 2022     IMPRESSION: The heart is normal in size. Left basilar atelectasis and or infiltrate and small left pleural effusion are identified. Median sternotomy wires are identified. No pneumothorax.    XR Chest 2 Views    Result Date: 2022  EXAM: XR CHEST 2 VIEWS LOCATION: [facility] DATE/TIME: 2022 9:54 AM INDICATION: Post chest tube removal evaluation COMPARISON: 2022     IMPRESSION: Sternotomy. Heart normal in size. Small left effusion and trace right effusion with atelectasis. No pneumothorax.    XR Chest 2 Views    Result Date: 2022  EXAM: XR CHEST 2 VIEWS LOCATION: [facility] DATE/TIME: 2022 9:26 AM INDICATION:  Coronary artery disease involving native heart, unspecified vessel or lesion type, unspecified whether angina present COMPARISON: 2018     IMPRESSION: Negative chest with no significant change.    Echocardiogram Complete    Result Date: 2022  [id number]^FAREED^MELODY^[place] [address]  Name: NGHIA DIGGS MRN: [id number] : 1950 Study Date: 2022 08:13 AM Age: 71 yrs Gender: Male Patient Location: [facility] Reason For Study: Coronary artery disease involving native heart, unspecified vess Ordering Physician: MELODY GUERRIER Referring Physician: MELODY GUERRIER Performed By: JONG  BSA: 2.2 m2 Height: 74 in Weight: 206 lb HR: 76 BP: 100/64 mmHg ______________________________________________________________________________ Procedure Complete Echo Adult.  ______________________________________________________________________________ Interpretation Summary  The left ventricle is normal in size. There is mild concentric left ventricular hypertrophy. The visual ejection fraction is 55-60%. Normal left ventricular wall motion Normal right ventricle size and systolic function. There is no comparison study available. ______________________________________________________________________________ Left Ventricle The left ventricle is normal in size. There is mild concentric left ventricular hypertrophy. Diastolic Doppler findings (E/E' ratio and/or other parameters) suggest left ventricular filling pressures are normal. The visual ejection fraction is 55-60%. Normal left ventricular wall motion.  Right Ventricle Normal right ventricle size and systolic function. TAPSE is normal, which is consistent with normal right ventricular systolic function.  Atria Normal left atrial size. Right atrial size is normal.  Mitral Valve There is mild mitral annular calcification. There is trace mitral regurgitation. There is no mitral valve stenosis.  Tricuspid Valve Tricuspid valve leaflets appear normal. There is trace to mild tricuspid regurgitation. There is no tricuspid stenosis.  Aortic Valve The aortic valve is trileaflet. No aortic regurgitation is present. No aortic stenosis is present.  Pulmonic Valve The pulmonic valve is not well visualized. There is trace pulmonic valvular regurgitation. There is no pulmonic valvular stenosis.  Vessels The aorta root is normal. Normal size ascending aorta.  Pericardium There is no pericardial effusion.  Rhythm The patient's rhythm cannot be determined due to a technically difficult ECG tracing. ______________________________________________________________________________ MMode/2D Measurements & Calculations IVSd: 1.1 cm  LVIDd: 3.6 cm LVIDs: 2.5 cm LVPWd: 1.3 cm FS: 28.8 % LV mass(C)d: 137.4 grams LV mass(C)dI: 62.4 grams/m2 Ao root diam: 3.3 cm  LA dimension: 3.2 cm asc Aorta Diam: 3.6 cm LA/Ao: 0.96 LVOT diam: 2.4 cm LVOT area: 4.4 cm2 LA Volume Indexed (AL/bp): 15.7 ml/m2 RWT: 0.71  Time Measurements MM HR: 68.0 BPM  Doppler Measurements & Calculations MV E max segundo: 48.9 cm/sec MV A max segundo: 59.7 cm/sec MV E/A: 0.82 MV max P.5 mmHg MV mean P.98 mmHg MV V2 VTI: 20.4 cm MVA(VTI): 3.2 cm2 MV dec time: 0.26 sec Ao V2 max: 108.6 cm/sec Ao max P.0 mmHg Ao V2 mean: 74.0 cm/sec Ao mean P.5 mmHg Ao V2 VTI: 18.5 cm BRAYDEN(I,D): 3.5 cm2 BRAYDEN(V,D): 3.4 cm2 LV V1 max P.7 mmHg LV V1 max: 81.9 cm/sec LV V1 VTI: 14.5 cm SV(LVOT): 64.5 ml SI(LVOT): 29.3 ml/m2 PA acc time: 0.10 sec PI end-d segundo: 55.3 cm/sec AV Segundo Ratio (DI): 0.75 BRAYDEN Index (cm2/m2): 1.6 E/E': 8.5 E/E' av.3 Lateral E/e': 8.0  Medial E/e': 8.6 Peak E' Segundo: 5.8 cm/sec  ______________________________________________________________________________ Report approved by: Sunil Paulino 2022 11:16 AM       US Lower Extremity Venous Mapping Bilateral    Result Date: 2022  EXAM: US LOWER EXTREMITY VENOUS MAPPING BILATERAL LOCATION: [facility] DATE/TIME: 2022 9:22 AM INDICATION: Coronary artery disease involving native heart, unspecified vessel or lesion type, unspecified whether angina present, Shortness of breath. Coronary arterial disease. Planned coronary arterial bypass grafting surgery. Evaluate the bilateral great and small saphenous vein diameters for potential use as a surgical bypass conduit. COMPARISON: None. TECHNIQUE: Grayscale, color flow, and duplex scan of extremity veins including responses to compression. FINDINGS: Patent and compressible bilateral great saphenous veins visualized from the upper thighs to the ankles. Patent and compressible bilateral small saphenous veins visualized from the upper calf to the ankles. Exam includes the common femoral, femoral, popliteal veins as well. RIGHT: No deep vein thrombosis. No popliteal  cyst. LEFT: No deep vein thrombosis. No popliteal cyst. VENOUS DIAMETERS RIGHT GREATER SAPHENOUS VEIN Upper Thigh: 3 mm Mid Thigh: 3 mm Lower Thigh: 3 mm Knee: 3 mm Upper Calf: 3 mm Mid Calf: 3 mm Lower Calf: 3 mm Ankle: 3 mm LEFT GREATER SAPHENOUS VEIN Upper Thigh: 5 mm Mid Thigh: 5 mm Lower Thigh: 4 mm Knee: 3 mm Upper Calf: 3 mm Mid Calf: 4 mm Lower Calf: 4 mm Ankle: 4 mm RIGHT LESSER SAPHENOUS VEIN Upper Calf: 2 mm Mid Calf: 3 mm Lower Calf: 3 mm LEFT LESSER SAPHENOUS VEIN Upper Calf: 2 mm Mid Calf: 2 mm Lower Calf: Diminutive and noncompressible     IMPRESSION: 1.  Patent compressible bilateral great and right small saphenous veins. On the left in the distal calf there is a noncompressible segment of the small saphenous vein which could represent current or prior thrombophlebitis. Above this level vessel is patent.     XR Chest Port 1 View    Result Date: 9/3/2022  EXAM: XR CHEST PORT 1 VIEW LOCATION: [facility] DATE/TIME: 9/3/2022 5:20 AM INDICATION: Post Op CVTS Surgery COMPARISON: 9/2/2022     IMPRESSION: Endotracheal tube has been removed. Stable right internal jugular pulmonary artery catheter with the tip coiled in the right pulmonary artery. Stable right chest tube. No pneumothorax. Left basilar atelectasis. Small left pleural effusion. Stable enlarged cardiac silhouette. Median sternotomy. The pulmonary vasculature is normal.    XR Chest Port 1 View    Result Date: 9/2/2022  EXAM: XR CHEST PORT 1 VIEW LOCATION: [facility] DATE/TIME: 9/2/2022 12:50 PM INDICATION: Postop CVTS surgery. COMPARISON: 08/24/2022.     IMPRESSION: Interval sternotomy and CABG with placement of mediastinal drains and a left chest tube, epicardial pacer wires. Endotracheal tube tip 3.5 cm above the carlos. Right IJ Manchester-Lourdes catheter with distal 2-3 cm possibly folded back. Manchester-Lourdes catheter could be withdrawn about 3 cm to be in optimal position. No pneumothorax or visible  "pleural fluid. A few strands of linear atelectasis left lung base. Heart size normal.          CT Head w/o Contrast    Result Date: 9/13/2022  EXAM: CT HEAD W/O CONTRAST LOCATION: [facility] DATE/TIME: 9/13/2022 2:06 PM INDICATION: fall with head injury COMPARISON: None. TECHNIQUE: Routine CT Head without IV contrast. Multiplanar reformats. Dose reduction techniques were used. FINDINGS: INTRACRANIAL CONTENTS: No intracranial hemorrhage, extraaxial collection, or mass effect.  No CT evidence of acute infarct. Mild presumed chronic small vessel ischemic changes. Moderate to marked prominence of the bilateral lateral ventricles and third ventricle. This appears disproportionate to the background mild diffuse parenchymal volume loss. In the appropriate clinical setting, a component of normal pressure hydrocephalus/communicating hydrocephalus should be considered. VISUALIZED ORBITS/SINUSES/MASTOIDS: No intraorbital abnormality. No paranasal sinus mucosal disease. No middle ear or mastoid effusion. BONES/SOFT TISSUES: No skull fracture. No scalp hematoma.     IMPRESSION: 1.  No acute intracranial abnormality. 2.  Moderate to marked prominence of the bilateral lateral ventricles and third ventricle appears disproportionate to the background mild diffuse parenchymal volume loss. In the appropriate clinical setting, a component of normal pressure hydrocephalus/communicating hydrocephalus should be considered.    POC US Guided Vascular Access    Result Date: 9/2/2022  Ultrasound was performed as guidance to an anesthesia procedure.  Click \"PACS images\" hyperlink below to view any stored images.  For specific procedure details, view procedure note authored by anesthesia.      Patient's new lab studies reviewed personally.  Patient's new radiology reports reviewed personally.  I personally viewed and personally interpreted patient's EKG: sinus rhythm, inferior infarct, age undetermined.     Note created " using dragon voice recognition software.  Errors in spelling or words which seems out of context are unintentional.  Sounds alike errors may have escaped editing.     09/13/2022  Jasbir Bethea MD  HOSPITALIST, [facility]  PAGER NO. [phone]

## 2022-09-13 NOTE — PROGRESS NOTES
Chart reviewed: Patient is  and largely independent with activities of daily living at baseline. Goal is home with wife Marylou's support. Cardiac rehab's current recommendation is outpatient cardiac rehab. CM will continue to monitor progression of care, review team recommendations and provide discharge planning assist as needed.

## 2022-09-13 NOTE — ED TRIAGE NOTES
Pt had a double bypass surgery on 09/02 and started to experience weakness and shortness of breath progressively over the last two days. Surgeon told him to come and be evaluated for dehydration.     Triage Assessment     Row Name 09/13/22 1119       Triage Assessment (Adult)    Airway WDL WDL       Respiratory WDL    Respiratory WDL all    Rhythm/Pattern, Respiratory shortness of breath       Skin Circulation/Temperature WDL    Skin Circulation/Temperature WDL WDL       Cardiac WDL    Cardiac WDL WDL       Peripheral/Neurovascular WDL    Peripheral Neurovascular WDL WDL       Cognitive/Neuro/Behavioral WDL    Cognitive/Neuro/Behavioral WDL WDL

## 2022-09-13 NOTE — PHARMACY-VANCOMYCIN DOSING SERVICE
"Pharmacy Vancomycin Initial Note  Date of Service 2022  Patient's  1950  71 year old, male    Indication: Aspiration pneumonia vs. hospital acquired pneumonia    Current estimated CrCl = Estimated Creatinine Clearance: 99.9 mL/min (based on SCr of 0.87 mg/dL).    Creatinine for last 3 days  2022: 12:43 PM Creatinine 0.87 mg/dL    Recent Vancomycin Level(s) for last 3 days  No results found for requested labs within last 72 hours.      Vancomycin IV Administrations (past 72 hours)      No vancomycin orders with administrations in past 72 hours.                Nephrotoxins and other renal medications (From now, onward)    Start     Dose/Rate Route Frequency Ordered Stop    22 1530  piperacillin-tazobactam (ZOSYN) 3.375 g vial to attach to  mL bag        Note to Pharmacy: For [facility], [facility] and [facility]: For Zosyn-naive patients, use the \"Zosyn initial dose + extended infusion\" order panel.    3.375 g  over 30 Minutes Intravenous ONCE 22 1516            Contrast Orders - past 72 hours (72h ago, onward)    None              Plan:  1. Start vancomycin  1750 mg IV x 1 dose  2. Vancomycin monitoring method: AUC  3. Vancomycin therapeutic monitoring goal: 400-600 mg*h/L  4. Pharmacy will check vancomycin levels as appropriate in 1-3 Days.    5. Serum creatinine levels will be ordered daily for the first week of therapy and at least twice weekly for subsequent weeks.      Shayla Martinez, Pharmacy Resident    "

## 2022-09-14 ENCOUNTER — APPOINTMENT (OUTPATIENT)
Dept: OCCUPATIONAL THERAPY | Facility: HOSPITAL | Age: 72
DRG: 205 | End: 2022-09-14
Attending: INTERNAL MEDICINE
Payer: COMMERCIAL

## 2022-09-14 ENCOUNTER — APPOINTMENT (OUTPATIENT)
Dept: PHYSICAL THERAPY | Facility: HOSPITAL | Age: 72
DRG: 205 | End: 2022-09-14
Attending: INTERNAL MEDICINE
Payer: COMMERCIAL

## 2022-09-14 ENCOUNTER — APPOINTMENT (OUTPATIENT)
Dept: RADIOLOGY | Facility: HOSPITAL | Age: 72
DRG: 205 | End: 2022-09-14
Attending: INTERNAL MEDICINE
Payer: COMMERCIAL

## 2022-09-14 ENCOUNTER — APPOINTMENT (OUTPATIENT)
Dept: CARDIOLOGY | Facility: HOSPITAL | Age: 72
DRG: 205 | End: 2022-09-14
Attending: INTERNAL MEDICINE
Payer: COMMERCIAL

## 2022-09-14 LAB
ALBUMIN SERPL-MCNC: 2.7 G/DL (ref 3.5–5)
ALP SERPL-CCNC: 101 U/L (ref 45–120)
ALT SERPL W P-5'-P-CCNC: 27 U/L (ref 0–45)
ANION GAP SERPL CALCULATED.3IONS-SCNC: 7 MMOL/L (ref 5–18)
AST SERPL W P-5'-P-CCNC: 20 U/L (ref 0–40)
BILIRUB SERPL-MCNC: 0.7 MG/DL (ref 0–1)
BUN SERPL-MCNC: 13 MG/DL (ref 8–28)
CALCIUM SERPL-MCNC: 8.4 MG/DL (ref 8.5–10.5)
CHLORIDE BLD-SCNC: 105 MMOL/L (ref 98–107)
CO2 SERPL-SCNC: 24 MMOL/L (ref 22–31)
CREAT SERPL-MCNC: 0.86 MG/DL (ref 0.7–1.3)
ERYTHROCYTE [DISTWIDTH] IN BLOOD BY AUTOMATED COUNT: 13.9 % (ref 10–15)
GFR SERPL CREATININE-BSD FRML MDRD: >90 ML/MIN/1.73M2
GLUCOSE BLD-MCNC: 71 MG/DL (ref 70–125)
GLUCOSE BLDC GLUCOMTR-MCNC: 117 MG/DL (ref 70–99)
GLUCOSE BLDC GLUCOMTR-MCNC: 161 MG/DL (ref 70–99)
HCT VFR BLD AUTO: 31.7 % (ref 40–53)
HGB BLD-MCNC: 10.5 G/DL (ref 13.3–17.7)
LVEF ECHO: NORMAL
MCH RBC QN AUTO: 31.2 PG (ref 26.5–33)
MCHC RBC AUTO-ENTMCNC: 33.1 G/DL (ref 31.5–36.5)
MCV RBC AUTO: 94 FL (ref 78–100)
PLATELET # BLD AUTO: 585 10E3/UL (ref 150–450)
POTASSIUM BLD-SCNC: 4 MMOL/L (ref 3.5–5)
PROT SERPL-MCNC: 5.8 G/DL (ref 6–8)
RBC # BLD AUTO: 3.37 10E6/UL (ref 4.4–5.9)
SODIUM SERPL-SCNC: 136 MMOL/L (ref 136–145)
TROPONIN I SERPL-MCNC: 0.02 NG/ML (ref 0–0.29)
TROPONIN I SERPL-MCNC: 0.03 NG/ML (ref 0–0.29)
WBC # BLD AUTO: 8.7 10E3/UL (ref 4–11)

## 2022-09-14 PROCEDURE — 36415 COLL VENOUS BLD VENIPUNCTURE: CPT | Performed by: INTERNAL MEDICINE

## 2022-09-14 PROCEDURE — 74019 RADEX ABDOMEN 2 VIEWS: CPT

## 2022-09-14 PROCEDURE — 99222 1ST HOSP IP/OBS MODERATE 55: CPT | Performed by: INTERNAL MEDICINE

## 2022-09-14 PROCEDURE — 999N000208 ECHOCARDIOGRAM LIMITED

## 2022-09-14 PROCEDURE — 97116 GAIT TRAINING THERAPY: CPT | Mod: GP

## 2022-09-14 PROCEDURE — 93308 TTE F-UP OR LMTD: CPT | Mod: 26 | Performed by: INTERNAL MEDICINE

## 2022-09-14 PROCEDURE — 250N000013 HC RX MED GY IP 250 OP 250 PS 637: Performed by: INTERNAL MEDICINE

## 2022-09-14 PROCEDURE — 85014 HEMATOCRIT: CPT | Performed by: INTERNAL MEDICINE

## 2022-09-14 PROCEDURE — 97165 OT EVAL LOW COMPLEX 30 MIN: CPT | Mod: GO

## 2022-09-14 PROCEDURE — 93321 DOPPLER ECHO F-UP/LMTD STD: CPT | Mod: 26 | Performed by: INTERNAL MEDICINE

## 2022-09-14 PROCEDURE — 97161 PT EVAL LOW COMPLEX 20 MIN: CPT | Mod: GP

## 2022-09-14 PROCEDURE — 210N000001 HC R&B IMCU HEART CARE

## 2022-09-14 PROCEDURE — 99232 SBSQ HOSP IP/OBS MODERATE 35: CPT | Performed by: INTERNAL MEDICINE

## 2022-09-14 PROCEDURE — 84155 ASSAY OF PROTEIN SERUM: CPT | Performed by: INTERNAL MEDICINE

## 2022-09-14 PROCEDURE — 93325 DOPPLER ECHO COLOR FLOW MAPG: CPT | Mod: 26 | Performed by: INTERNAL MEDICINE

## 2022-09-14 PROCEDURE — 255N000002 HC RX 255 OP 636: Performed by: INTERNAL MEDICINE

## 2022-09-14 PROCEDURE — 84484 ASSAY OF TROPONIN QUANT: CPT | Performed by: INTERNAL MEDICINE

## 2022-09-14 PROCEDURE — 97110 THERAPEUTIC EXERCISES: CPT | Mod: GO

## 2022-09-14 PROCEDURE — 250N000011 HC RX IP 250 OP 636: Performed by: INTERNAL MEDICINE

## 2022-09-14 PROCEDURE — 93321 DOPPLER ECHO F-UP/LMTD STD: CPT

## 2022-09-14 RX ORDER — POLYETHYLENE GLYCOL 3350 17 G/17G
17 POWDER, FOR SOLUTION ORAL DAILY
Status: DISCONTINUED | OUTPATIENT
Start: 2022-09-14 | End: 2022-09-16 | Stop reason: HOSPADM

## 2022-09-14 RX ORDER — BISACODYL 10 MG
10 SUPPOSITORY, RECTAL RECTAL DAILY PRN
Status: DISCONTINUED | OUTPATIENT
Start: 2022-09-14 | End: 2022-09-16 | Stop reason: HOSPADM

## 2022-09-14 RX ORDER — AMOXICILLIN 250 MG
1 CAPSULE ORAL 2 TIMES DAILY PRN
Status: DISCONTINUED | OUTPATIENT
Start: 2022-09-14 | End: 2022-09-16 | Stop reason: HOSPADM

## 2022-09-14 RX ORDER — FUROSEMIDE 10 MG/ML
20 INJECTION INTRAMUSCULAR; INTRAVENOUS EVERY 12 HOURS
Status: DISCONTINUED | OUTPATIENT
Start: 2022-09-14 | End: 2022-09-15

## 2022-09-14 RX ORDER — ASPIRIN 81 MG/1
162 TABLET, CHEWABLE ORAL DAILY
Status: DISCONTINUED | OUTPATIENT
Start: 2022-09-14 | End: 2022-09-16 | Stop reason: HOSPADM

## 2022-09-14 RX ORDER — BISACODYL 5 MG
5 TABLET, DELAYED RELEASE (ENTERIC COATED) ORAL DAILY PRN
Status: DISCONTINUED | OUTPATIENT
Start: 2022-09-14 | End: 2022-09-16 | Stop reason: HOSPADM

## 2022-09-14 RX ADMIN — Medication 100 MCG: at 08:28

## 2022-09-14 RX ADMIN — POTASSIUM CHLORIDE 10 MEQ: 750 TABLET, EXTENDED RELEASE ORAL at 08:21

## 2022-09-14 RX ADMIN — PERFLUTREN 2 ML: 6.52 INJECTION, SUSPENSION INTRAVENOUS at 12:16

## 2022-09-14 RX ADMIN — VANCOMYCIN HYDROCHLORIDE 1000 MG: 1 INJECTION, SOLUTION INTRAVENOUS at 05:34

## 2022-09-14 RX ADMIN — METOPROLOL TARTRATE 37.5 MG: 25 TABLET, FILM COATED ORAL at 08:21

## 2022-09-14 RX ADMIN — FINASTERIDE 5 MG: 5 TABLET, FILM COATED ORAL at 08:21

## 2022-09-14 RX ADMIN — ENOXAPARIN SODIUM 40 MG: 40 INJECTION SUBCUTANEOUS at 20:25

## 2022-09-14 RX ADMIN — FUROSEMIDE 20 MG: 10 INJECTION, SOLUTION INTRAMUSCULAR; INTRAVENOUS at 12:29

## 2022-09-14 RX ADMIN — PIPERACILLIN AND TAZOBACTAM 3.38 G: 3; .375 INJECTION, POWDER, LYOPHILIZED, FOR SOLUTION INTRAVENOUS at 05:34

## 2022-09-14 RX ADMIN — FLUOXETINE 60 MG: 20 CAPSULE ORAL at 08:21

## 2022-09-14 RX ADMIN — TAMSULOSIN HYDROCHLORIDE 0.4 MG: 0.4 CAPSULE ORAL at 20:25

## 2022-09-14 RX ADMIN — GLIPIZIDE 5 MG: 5 TABLET, EXTENDED RELEASE ORAL at 08:21

## 2022-09-14 RX ADMIN — AMIODARONE HYDROCHLORIDE 200 MG: 200 TABLET ORAL at 08:28

## 2022-09-14 RX ADMIN — METOPROLOL TARTRATE 37.5 MG: 25 TABLET, FILM COATED ORAL at 20:23

## 2022-09-14 RX ADMIN — ATORVASTATIN CALCIUM 80 MG: 40 TABLET, FILM COATED ORAL at 08:21

## 2022-09-14 RX ADMIN — ASPIRIN 81 MG CHEWABLE TABLET 162 MG: 81 TABLET CHEWABLE at 20:24

## 2022-09-14 RX ADMIN — Medication 1 CAPSULE: at 08:28

## 2022-09-14 RX ADMIN — AMIODARONE HYDROCHLORIDE 200 MG: 200 TABLET ORAL at 20:24

## 2022-09-14 RX ADMIN — GLIPIZIDE 5 MG: 5 TABLET, EXTENDED RELEASE ORAL at 17:34

## 2022-09-14 ASSESSMENT — ACTIVITIES OF DAILY LIVING (ADL)
DIFFICULTY_EATING/SWALLOWING: NO
ADLS_ACUITY_SCORE: 39
VISION_MANAGEMENT: GLASSES
CONCENTRATING,_REMEMBERING_OR_MAKING_DECISIONS_DIFFICULTY: NO
ADLS_ACUITY_SCORE: 28
ADLS_ACUITY_SCORE: 28
TOILETING_ISSUES: NO
CHANGE_IN_FUNCTIONAL_STATUS_SINCE_ONSET_OF_CURRENT_ILLNESS/INJURY: NO
ADLS_ACUITY_SCORE: 39
ADLS_ACUITY_SCORE: 39
PREVIOUS_RESPONSIBILITIES: MEAL PREP;HOUSEKEEPING;LAUNDRY;SHOPPING;YARDWORK;MEDICATION MANAGEMENT;FINANCES;DRIVING;WORK
DRESSING/BATHING_DIFFICULTY: NO
FALL_HISTORY_WITHIN_LAST_SIX_MONTHS: NO
WALKING_OR_CLIMBING_STAIRS_DIFFICULTY: NO
ADLS_ACUITY_SCORE: 41
DOING_ERRANDS_INDEPENDENTLY_DIFFICULTY: NO
ADLS_ACUITY_SCORE: 28
WEAR_GLASSES_OR_BLIND: YES
ADLS_ACUITY_SCORE: 39

## 2022-09-14 NOTE — PROGRESS NOTES
Physical Therapy Discharge Summary    Reason for therapy discharge:    Deferring to cardiac rehab as primary for discharge recs.    Progress towards therapy goal(s). See goals on Care Plan in [facility] electronic health record for goal details.  Goals met    Therapy recommendation(s):    Continued therapy is recommended.  Rationale/Recommendations:  Cardiac rehab.     Per discussion with cardiac rehab OT, will defer to CR for discharge recs and complete orders.

## 2022-09-14 NOTE — PROGRESS NOTES
09/14/22 1300   Quick Adds   Type of Visit Initial PT Evaluation   Living Environment   People in Home spouse   Current Living Arrangements house   Home Accessibility stairs to enter home   Number of Stairs, Main Entrance 2   Stair Railings, Main Entrance none   Number of Stairs, Within Home, Primary greater than 10 stairs  (14)   Stair Railings, Within Home, Primary railing on left side (ascending)   Transportation Anticipated family or friend will provide   Living Environment Comments Pt lives in a house with wife. 2 ROSSY, no railings. Needs generally met on main level but pt does go upstairs to use his office.   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   General Information   Onset of Illness/Injury or Date of Surgery 09/12/22   Referring Physician Dr. Diamond Arceo.   Patient/Family Therapy Goals Statement (PT) To go home.   Pertinent History of Current Problem (include personal factors and/or comorbidities that impact the POC) From chart: 71 year old male with PMH of DM-2, hyperlipidemia, CAD, s/p CABG x 2 on 9/2/22, DANIELLE and BPH who presented to our ED for evaluation of fatigue, weakness, shortness of breath and fall.   Existing Precautions/Restrictions fall;sternal   Weight-Bearing Status - LUE partial weight-bearing (% in comments)  (5 lbs.)   Weight-Bearing Status - RUE partial weight-bearing (% in comments)  (5 lbs.)   Weight-Bearing Status - LLE full weight-bearing   Weight-Bearing Status - RLE full weight-bearing   Heart Disease Risk Factors Medical history;Gender;Age   General Observations  Male supine in bed.   Cognition   Affect/Mental Status (Cognition) WNL   Orientation Status (Cognition) oriented x 4   Follows Commands (Cognition) WNL   Cognitive Status Comments Pleasant, cooperative.   Pain Assessment   Patient Currently in Pain No   Integumentary/Edema   Integumentary/Edema Comments Not formally assessed.   Posture    Posture Not impaired   Range of Motion (ROM)   ROM Comment Grossly WFL.   Strength (Manual Muscle Testing)   Strength Comments Appears WFL.   Bed Mobility   Comment, (Bed Mobility) IND.   Transfers   Comment, (Transfers) Sit-stand with walker, SBA.   Gait/Stairs (Locomotion)   Comment, (Gait/Stairs) Ambulated x 10 ft with walker, SBA. Safe and steady. Up and down full flight of stairs with L rail, CGA, reciprocal pattern. Safe and steady.   Balance   Balance Comments Adequate for ambulation.   Sensory Examination   Sensory Perception patient reports no sensory changes   Coordination   Coordination no deficits were identified   Muscle Tone   Muscle Tone no deficits were identified   Clinical Impression   Criteria for Skilled Therapeutic Intervention Yes, treatment indicated   PT Diagnosis (PT) Impaired endurance.   Influenced by the following impairments Medical.   Functional limitations due to impairments Endurance, stairs.   Clinical Presentation (PT Evaluation Complexity) Stable/Uncomplicated   Clinical Presentation Rationale Clinician judgment.   Clinical Decision Making (Complexity) low complexity   Planned Therapy Interventions (PT) gait training;home exercise program;patient/family education;strengthening   Anticipated Equipment Needs at Discharge (PT)   (TBD, possibly walker.)   Risk & Benefits of therapy have been explained patient   PT Discharge Planning   PT Discharge Recommendation (DC Rec) home with assist;home with outpatient cardiac rehab   PT Rationale for DC Rec Pt mobilizing well, assist available at home. Would recommend home with OP cardiac rehab given diagnosis.    PT Brief overview of current status PT eval completed. Pt mobilizing well. Ambulating ~200 ft with SBA, completed stairs.   Plan of Care Review   Plan of Care Reviewed With patient   Total Evaluation Time   Total Evaluation Time (Minutes) 8   Physical Therapy Goals   PT Frequency Daily   PT Predicted Duration/Target Date for Goal Attainment 09/17/22   PT Goals Bed Mobility;Transfers;Gait;Stairs   PT: Bed Mobility Independent;Within precautions   PT: Transfers Modified independent;Assistive device;Within precautions   PT: Gait Modified independent;Assistive device;Within precautions;Greater than 200 feet   PT: Stairs Modified independent;Within precautions;Greater than 10 stairs;Rail on left

## 2022-09-14 NOTE — PHARMACY-VANCOMYCIN DOSING SERVICE
"Pharmacy Vancomycin Initial Note  Date of Service 2022  Patient's  1950  71 year old, male    Indication: [facility]-Associated Pneumonia    Current estimated CrCl = Estimated Creatinine Clearance: 100.1 mL/min (based on SCr of 0.87 mg/dL).    Creatinine for last 3 days  2022: 12:43 PM Creatinine 0.87 mg/dL    Recent Vancomycin Level(s) for last 3 days  No results found for requested labs within last 72 hours.      Vancomycin IV Administrations (past 72 hours)                   vancomycin (VANCOCIN) 1,750 mg in sodium chloride 0.9 % 500 mL intermittent infusion (mg) 1,750 mg Given 22 1727                Nephrotoxins and other renal medications (From now, onward)    Start     Dose/Rate Route Frequency Ordered Stop    22 2200  piperacillin-tazobactam (ZOSYN) 3.375 g vial to attach to  mL bag        Note to Pharmacy: For [facility], [facility] and [facility]: For Zosyn-naive patients, use the \"Zosyn initial dose + extended infusion\" order panel.    3.375 g  over 240 Minutes Intravenous EVERY 8 HOURS 22 1800            Contrast Orders - past 72 hours (72h ago, onward)    None          [employer] Prediction of Planned Initial Vancomycin Regimen  Loading dose: 1750 mg on 22 at 17:27  Regimen: 1000 mg IV every 12 hours.  Start time: 05:30 on 2022  Exposure target: AUC24 (range)400-600 mg/L.hr   AUC24,ss: 512 mg/L.hr  Probability of AUC24 > 400: 75 %  Ctrough,ss: 16.7 mg/L  Probability of Ctrough,ss > 20: 34 %  Probability of nephrotoxicity (Lodise LUPILLO ): 12 %    Plan:  1. Start vancomycin  1000 mg IV q12h.   2. Vancomycin monitoring method: AUC  3. Vancomycin therapeutic monitoring goal: 400-600 mg*h/L  4. Pharmacy will check vancomycin levels as appropriate in 1-3 Days.    5. Serum creatinine levels will be ordered daily for the first week of therapy and at least twice weekly for subsequent weeks.      Katia Hernandez, [facility]    "

## 2022-09-14 NOTE — PROGRESS NOTES
[facility]    PROGRESS NOTE - Hospitalist Service    Assessment and Plan    Active Problems:    Pneumonia    CAD (coronary artery disease)    Fatigue    Near syncope    Bala Hopper is a 71 year old male with h/o DM-2, hyperlipidemia, CAD, s/p CABG x 2 on 9/2/22, DANIELLE and BPH who presented to our ED for evaluation of fatigue, weakness, shortness of breath and fall.      Fatigue, weakness and shortness of breath  - less likely PNA since afebrile, normal procal, WBC slightly up but more likely acute phase reactant   - reviewed CT   - stopped IV abx  - flutter valve  - cards started low dose lasix and patient already feeling better  -s top IVF      Fall:  -- Due to weakness.   -- CT head- no acute intracranial processes. The ventricles are disproportionally enlarged raising the suspicion of NPH  -- PT/OT     CAD   S/P recent CABG x 2  -- Denies any new chest pain. Trop is negative. EKG shows NSR with inferior infarct, age undetermined. Trend trops  -- Tiny left effusion is likely common after the CABG.   -- Echo from 8/24/22 with normal EF of 55-60%. Clinically doesn't appear vol up. BNP is 190. No prior BNP for comparison  -- Cont with home meds- BB, statin, aspirin and amiodarone.   - CV[facility] seen nothing to add  - repeat ECHO     DM-2:  -- Last A1c  6.1 as of 8/26/22  -- Hold metformin. Cont glipizide and lantus. Add sliding scale insulin     Hyperlipidemia:  -- Cont statin as above. Check total CK     BPH:  -- Cont with home meds     Hyponatremia:  Resolved   The visual ejection fraction is estimated at 55%. The left ventricle is  normal in size. There is borderline concentric left ventricular hypertrophy.  Flattened septum is consistent with RV pressure overload.  2. The right ventricle is mildly dilated. Mildly decreased right ventricular  systolic function  3. Small pericardial effusion. There are no echocardiographic indications of  cardiac tamponade. Normal RA pressure of 3  mmHg.    COVID-19 PCR Results    COVID-19 PCR Results 8/30/22 9/13/22   SARS CoV2 PCR Negative Negative      Comments are available for some flowsheets but are not being displayed.         COVID-19 Antibody Results, Testing for Immunity    COVID-19 Antibody Results, Testing for Immunity   No data to display.            VTE prophylaxis:  Enoxaparin (Lovenox) SQ  DIET: Orders Placed This Encounter      Combination Diet Regular Diet Adult; Moderate Consistent Carb (60 g CHO per Meal) Diet    Drains/Lines: noen  Weight bearing status: WBAT  Disposition/Barriers to discharge: anticipated tomorrow pending therapy and cards further recs   Code Status: Full Code    Subjective:  Feeling better this afternoon after some diuretics     PHYSICAL EXAM  Temp:  [97.8  F (36.6  C)-98.6  F (37  C)] 98.6  F (37  C)  Pulse:  [60-88] 64  Resp:  [16-20] 20  BP: (107-135)/(59-88) 108/61  SpO2:  [94 %-96 %] 94 %  Wt Readings from Last 1 Encounters:   09/14/22 89.1 kg (196 lb 6.2 oz)       Intake/Output Summary (Last 24 hours) at 9/14/2022 1455  Last data filed at 9/14/2022 1342  Gross per 24 hour   Intake 478 ml   Output 1100 ml   Net -622 ml      Body mass index is 25.21 kg/m .    Physical Exam  Constitutional:       Appearance: Normal appearance.   HENT:      Head: Normocephalic and atraumatic.   Cardiovascular:      Rate and Rhythm: Normal rate and regular rhythm.      Pulses: Normal pulses.      Comments: Sternotomy healing   Pulmonary:      Effort: Pulmonary effort is normal.      Comments: Trace bibasilar crackles   Abdominal:      General: Bowel sounds are normal.      Palpations: Abdomen is soft.   Musculoskeletal:         General: Normal range of motion.      Right lower leg: No edema.      Left lower leg: No edema.   Skin:     General: Skin is warm and dry.      Capillary Refill: Capillary refill takes less than 2 seconds.   Neurological:      General: No focal deficit present.      Mental Status: He is alert and oriented to  person, place, and time. Mental status is at baseline.       PERTINENT LABS/IMAGING:  Results for orders placed or performed during the hospital encounter of 09/13/22   CT Head w/o Contrast    Impression    IMPRESSION:  1.  No acute intracranial abnormality.    2.  Moderate to marked prominence of the bilateral lateral ventricles and third ventricle appears disproportionate to the background mild diffuse parenchymal volume loss. In the appropriate clinical setting, a component of normal pressure   hydrocephalus/communicating hydrocephalus should be considered.   XR Chest 2 Views    Impression    IMPRESSION: The heart is normal in size. Left basilar atelectasis and or infiltrate and small left pleural effusion are identified. Median sternotomy wires are identified. No pneumothorax.   Chest CT w/o contrast    Impression    IMPRESSION:   1.  Small to moderate left and small right pleural effusions are present with related atelectasis. There are no specific findings to suggest a superimposed pneumonia.  2.  Status post recent median sternotomy and coronary revascularization. There is expected fluid and air in the anterior mediastinum.     XR Abdomen 2 Views    Impression    IMPRESSION: There are small, bilateral pleural effusions left greater than right with associated left lower lobe opacities as seen on the chest CT. Poststernotomy changes.    No free air on the upright view. Bowel gas pattern is notable for a paucity of stool. No abnormally dilated loops of bowel. Numerous phleboliths in the pelvis. There is mild lumbar rotoscoliosis convex to the left centered at L3.   Echocardiogram Limited   Result Value Ref Range    LVEF  55%        Recent Labs   Lab 09/14/22  1140 09/14/22  0808 09/14/22  0507 09/13/22  1925 09/13/22  1243   WBC  --   --  8.7  --  13.3*   HGB  --   --  10.5*  --  9.7*   MCV  --   --  94  --  93   PLT  --   --  585*  --  688*   INR  --   --   --   --  1.01   NA  --   --  136  --  131*   POTASSIUM   --   --  4.0  --  4.4   CHLORIDE  --   --  105  --  100   CO2  --   --  24  --  20*   BUN  --   --  13  --  19   CR  --   --  0.86  --  0.87   ANIONGAP  --   --  7  --  11   EVELYNE  --   --  8.4*  --  8.6   * 117* 71   < > 98   ALBUMIN  --   --  2.7*  --  2.9*   PROTTOTAL  --   --  5.8*  --  6.1   BILITOTAL  --   --  0.7  --  0.6   ALKPHOS  --   --  101  --  110   ALT  --   --  27  --  32   AST  --   --  20  --  21    < > = values in this interval not displayed.     Recent Results (from the past 24 hour(s))   XR Abdomen 2 Views    Narrative    EXAM: XR ABDOMEN 2 VIEWS  LOCATION: [facility]  DATE/TIME: 2022 10:18 AM    INDICATION: flat and upright and assess stool burden vs SBO with vomiting  COMPARISON: Chest CT 2022      Impression    IMPRESSION: There are small, bilateral pleural effusions left greater than right with associated left lower lobe opacities as seen on the chest CT. Poststernotomy changes.    No free air on the upright view. Bowel gas pattern is notable for a paucity of stool. No abnormally dilated loops of bowel. Numerous phleboliths in the pelvis. There is mild lumbar rotoscoliosis convex to the left centered at L3.   Echocardiogram Limited   Result Value    LVEF  55%    Narrative    [id number]  [id number]  [id number]  [id number]^JH^[place] [address]     Name: NGHIA DIGGS  MRN: [id number]  : 1950  Study Date: 2022 11:58 AM  Age: 71 yrs  Gender: Male  Patient Location: [facility]  Reason For Study: CHF  Ordering Physician: DEZ PEÑALOZA  Referring Physician: DEZ PEÑALOZA  Performed By: DOUG     BSA: 2.2 m2  Height: 74 in  Weight: 196 lb  HR: 66  ______________________________________________________________________________  Procedure  Limited Echo Adult. Definity ([facility]C #33808-659) given intravenously.  ______________________________________________________________________________  Interpretation  Summary     1. The visual ejection fraction is estimated at 55%. The left ventricle is  normal in size. There is borderline concentric left ventricular hypertrophy.  Flattened septum is consistent with RV pressure overload.  2. The right ventricle is mildly dilated. Mildly decreased right ventricular  systolic function  3. Small pericardial effusion. There are no echocardiographic indications of  cardiac tamponade. Normal RA pressure of 3 mmHg.  ______________________________________________________________________________  Left Ventricle  The left ventricle is normal in size. There is borderline concentric left  ventricular hypertrophy. The visual ejection fraction is estimated at 55%.  Flattened septum is consistent with RV pressure overload.     Right Ventricle  The right ventricle is mildly dilated. Mildly decreased right ventricular  systolic function.     Atria  The left atrium is not well visualized. Normal left atrial size. Right atrium  not well visualized.     Mitral Valve  The mitral valve is not well visualized. There is trace mitral regurgitation.  There is no mitral valve stenosis.     Tricuspid Valve  This degree of valvular regurgitation is within normal limits.     Aortic Valve  The aortic valve is not well visualized. There is trace to mild aortic  regurgitation. No aortic stenosis is present.     Pulmonic Valve  The pulmonic valve is not well visualized. There is trace pulmonic valvular  regurgitation.     Vessels  IVC diameter <2.1 cm collapsing >50% with sniff suggests a normal RA pressure  of 3 mmHg.     Pericardium  Small pericardial effusion. There are no echocardiographic indications of  cardiac tamponade.     ______________________________________________________________________________  Time Measurements     MM HR: 64.0 BPM     Doppler Measurements & Calculations  PI end-d yadiel: 82.8 cm/sec  TR max yadiel: 198.0 cm/sec  TR max PG: 15.7 mmHg      ______________________________________________________________________________  Report approved by: Sunil Avila 09/14/2022 01:54 PM           Recent Labs   Lab Test 08/10/22  0710   CHOL 113   HDL 31*   LDL 69   TRIG 64     Recent Labs   Lab Test 08/10/22  0710 11/04/21  0914 09/29/20  1653   LDL 69 140* 83     Recent Labs   Lab Test 09/14/22  1140 09/14/22  0808 09/14/22  0507   NA  --   --  136   POTASSIUM  --   --  4.0   CHLORIDE  --   --  105   CO2  --   --  24   *   < > 71   BUN  --   --  13   CR  --   --  0.86   GFRESTIMATED  --   --  >90   EVELYNE  --   --  8.4*    < > = values in this interval not displayed.     Recent Labs   Lab Test 08/26/22  0905   A1C 6.1*     Recent Labs   Lab Test 09/14/22  0507 09/13/22  1243 09/05/22  0438   HGB 10.5* 9.7* 9.9*     Recent Labs   Lab Test 09/14/22  0507 09/13/22  2334 09/13/22  1243   TROPONINI 0.03 0.02 0.03     Recent Labs   Lab Test 09/13/22  1243   *     Recent Labs   Lab Test 09/17/21  1228   TSH 2.12     Recent Labs   Lab Test 09/13/22  1243 09/02/22  1200 09/02/22  1113   INR 1.01 1.37* 1.43*       Diamond Arceo MD  [facility] Medicine Service  [phone]

## 2022-09-14 NOTE — CONSULTS
CARDIOLOGY CONSULT NOTE         Assessment:   1.  Shortness of breath-presumed pneumonia based on CT scan although this could be atelectasis following surgery.  No increased white count, procalcitonin.  Cultures negative thus far.  Will check lactic acid.  Suspect there is an element of heart failure given increased BNP.  2.  Heart failure-suspect acute in the setting of preserved ejection fraction although echo pending.  Will give 2 doses of low-dose diuretic on the basis of chest CT as well as elevated BNP.  3.  Pneumonia-as above doubtful based on blood studies.  4.  CAD (coronary artery disease)-angiography August 2022 showed 20% ostial left main, proximal LAD 25% stenosis with a mid total occlusion, second diagonal with an 80% stenosis.  Circumflex had a proximal 20% followed by mid 50% lesion in the right coronary artery had a distal  25% lesion, this prompted coronary artery bypass grafting.  5.  Coronary artery bypass grafting- September 2 he had a LIMA to the LAD and a vein graft to the diagonal.  6.  Atrial fibrillation-postop, on amiodarone.  Probably utilizes for only 6 months.  Will monitor telemetry for recurrences.     Plan:   1.  Low-dose furosemide for 2 doses given increased BNP.  2.  Check lactic acid level.  3.  Limited echo looking for worsening effusion.  4.  Can follow with Dr. Vijay Grande primary cardiologist.     Current History:   [facility] has been requested by Dr. Bere Mckeon to evaluate Bala Hopper  who is a 71 year old year old white male for heart disease.  Patient underwent coronary artery bypass grafting after having abnormal angiography following abnormal stress MRI showing mid to distal anteroseptal ischemia in the setting of increased shortness of breath and activity.  This is a two-vessel bypass with a LIMA to the LAD and vein graft to the diagonal on September 2, he was subsequently discharged on September 7 following a short bout of atrial fibrillation  necessitating the use of amiodarone.  While at home he he developed some generalized fatigue 3 days prior to presentation with some nausea and vomiting 2 days prior to presentation.  The day of presentation he felt little weak and lightheaded and had increased shortness of breath and activity and his legs gave out he fell.  His wife called the paramedics, he is brought to [facility] with a presumptive diagnosis of pneumonia and cardiology consultation is requested.  Other than above he denies any true syncope, presyncope, chest pains, palpitations, PND, orthopnea or peripheral edema.    Past Medical History:     Past Medical History:   Diagnosis Date     BPH (benign prostatic hyperplasia)      Cerebral infarction (H)      Depression      Diabetes mellitus (H)      Hyperlipidemia LDL goal <100      DANIELLE (obstructive sleep apnea)      Tardive dyskinesia  HTN benign essential 2019      Past history is negative for cancer, tuberculosis, myocardial infarction,  rheumatic fever, cerebrovascular accident, chronic kidney disease, peptic ulcer disease, chronic obstructive pulmonary disease, or thyroid disorder.    Past Surgical History:     Past Surgical History:   Procedure Laterality Date     BYPASS GRAFT ARTERY CORONARY N/A 9/2/2022    Procedure: CORONARY ARTERY BYPASS GRAFT (CABG)X2 LEFT LEG ENDOSCOPIC SAPHENOUS VESSEL PROCUREMENT, LEFT INTERNAL MAMMARY ARTERY HARVEST, ANESTHESIA TRANSESOPHAGEAL ECHOCARDIOGRAM;EPI AORTIC ULTRASOUND;  Surgeon: Carline Bradshaw MD;  Location: [facility] Main OR     CV CORONARY ANGIOGRAM N/A 8/10/2022    Procedure: Coronary Angiogram;  Surgeon: Annette Leavitt MD;  Location: [facility] CATH LAB CV     CV LEFT HEART CATH N/A 8/10/2022    Procedure: Left Heart Catheterization;  Surgeon: Annette Leavitt MD;  Location: [facility] CATH LAB CV     tendon right wrist Right      Family History:   Reviewed, and negative for coronary artery disease.    Social History:   Reviewed, and he   reports that he has never smoked. He has never used smokeless tobacco. He reports previous alcohol use. He reports that he does not use drugs.  He lives at home independently with his wife, is a retired , and primary physician is Dr. Misha Dawson.    Meds:       amiodarone  200 mg Oral BID     atorvastatin  80 mg Oral Daily     enoxaparin ANTICOAGULANT  40 mg Subcutaneous Q24H     finasteride  5 mg Oral Daily     FLUoxetine  60 mg Oral Daily     glipiZIDE  5 mg Oral BID AC     insulin aspart  1-7 Units Subcutaneous TID AC     lactobacillus rhamnosus (GG)  1 capsule Oral Daily     metoprolol tartrate  37.5 mg Oral BID     potassium chloride ER  10 mEq Oral Daily     sodium chloride (PF)  3 mL Intracatheter Q8H     tamsulosin  0.4 mg Oral QPM     vitamin D3  100 mcg Oral Daily     Allergies:   Patient has no known allergies.    Review of Systems:   A 12 point comprehensive review of systems was  as follows:   General: Positive for fatigue, negative weight loss or weight gain  Constitutional: negative for anorexia, chills, fevers, malaise, night sweats   Eyes: negative for cataracts, color blindness, contacts/glasses, glaucoma, icterus, irritation, redness and visual disturbance  Ears, nose, mouth, throat, and face: negative for ear drainage, earaches, epistaxis, facial trauma, hearing loss, hoarseness, nasal congestion, snoring, sore mouth, sore throat, tinnitus and voice change  Respiratory: Positive dyspnea on exertion, negative hemoptysis, sputum production, pleurisy, stridor, wheezing, PND, orthopnea  Cardiovascular: negative for chest pain, chest pressure/discomfort, claudication, dyspnea, exertional chest pressure/discomfort, fatigue, irregular heart beat, lower extremity edema, near-syncope,  palpitations,  syncope   Gastrointestinal: negative for abdominal pain, change in bowel haibits, constipation, diarrhea, dyspepsia, dysphagia, jaundice, melena, nausea, odynophagia, reflux symptoms and  "vomiting  Genitourinary: negative for decreased stream  Hematologic/lymphatic: negative for bleeding  Musculoskeletal: negative for arthralgias, back pain, bone pain, muscle weakness, myalgias, neck pain and stiff joints  Neurological: negative for syncope, presyncope, coordination problems, dizziness, gait problems, headaches, memory problems, paresthesia, seizures, speech problems, tremors, vertigo and weakness    Objective:     Physical Exam:  /88 (BP Location: Left arm)   Pulse 69   Temp 97.8  F (36.6  C) (Oral)   Resp 18   Ht 1.88 m (6' 2\")   Wt 89.1 kg (196 lb 6.2 oz)   SpO2 94%   BMI 25.21 kg/m       Head:    Normocephalic, without obvious abnormality, atraumatic   Eyes:    PERRL, conjunctiva/corneas clear, EOM's intact,           Nose:   Nares normal, septum midline, mucosa normal, no drainage  or sinus tenderness   Throat:   Lips, mucosa, and tongue normal; teeth and gums normal   Neck:   Supple, symmetrical, trachea midline, no adenopathy;        thyroid:  No enlargement/tenderness/nodules; no carotid    bruit or JVD   Back:     Symmetric, no curvature, ROM normal, no CVA tenderness   Lungs:     Decreased with crackles left base, respirations unlabored   Chest wall:    No tenderness, midline sternotomy scar   Heart:    Regular rate and rhythm, S1 and S2 normal, no murmur, rub   or gallop   Abdomen:     Soft, non-tender, bowel sounds active all four quadrants,     no masses, no organomegaly   Extremities:   Extremities normal, atraumatic, no cyanosis or edema   Pulses:   2+ and symmetric all extremities   Skin:   Skin color, texture, turgor normal, no rashes or lesions   Neurologic:   CNII-XII intact. Normal strength, sensation and reflexes       throughout     Cardiographics and Imaging  Radiology Results: Chest x-ray shows   1.  Small to moderate left and small right pleural effusions are present with related atelectasis. There are no specific findings to suggest a superimposed pneumonia.  2. "  Status post recent median sternotomy and coronary revascularization. There is expected fluid and air in the anterior mediastinum.    EKG Results: personally reviewed sinus rhythm, borderline leftward axis, late transition, borderline prolonged QT corrected, no acute changes.    Lab Review   Pertinent Labs  Lab Results: personally reviewed       Lab Results   Component Value Date    TROPONINI 0.03 09/14/2022       Lab Results   Component Value Date    BUN 13 09/14/2022     09/14/2022    CO2 24 09/14/2022       Lab Results   Component Value Date    WBC 8.7 09/14/2022    HGB 10.5 (L) 09/14/2022    HCT 31.7 (L) 09/14/2022    MCV 94 09/14/2022     (H) 09/14/2022       Lab Results   Component Value Date     (H) 09/13/2022

## 2022-09-14 NOTE — PLAN OF CARE
"Pt A&Ox4, VSS, RA. Up with assist x1, walker and GB. Voiding without complaint, No BM this shift. Telemetry: NSR/SB. PIV: NS @ 75, IV ATBx. Poor appetite, eating 0-25% of all meals and snacks. Post CABG: Sternal incision, clean dry and intact, open to air. Pt uses \"heart pillow\" appropriately. Discharge Plan: Home with wife. No additional needs or concerns verbalized or identified.   Problem: Plan of Care - These are the overarching goals to be used throughout the patient stay.    Goal: Readiness for Transition of Care  Outcome: Ongoing, Progressing     Problem: Fall Injury Risk  Goal: Absence of Fall and Fall-Related Injury  Outcome: Ongoing, Progressing  Intervention: Identify and Manage Contributors  Recent Flowsheet Documentation  Taken 9/13/2022 1925 by Alesia Stoddard, RN  Medication Review/Management: medications reviewed  Intervention: Promote Injury-Free Environment  Recent Flowsheet Documentation  Taken 9/13/2022 1925 by Alesia Stoddard RN  Safety Promotion/Fall Prevention:   activity supervised   assistive device/personal items within reach   clutter free environment maintained   fall prevention program maintained   lighting adjusted   mobility aid in reach   nonskid shoes/slippers when out of bed     Problem: Plan of Care - These are the overarching goals to be used throughout the patient stay.    Goal: Plan of Care Review/Shift Note  Description: The Plan of Care Review/Shift note should be completed every shift.  The Outcome Evaluation is a brief statement about your assessment that the patient is improving, declining, or no change.  This information will be displayed automatically on your shift note.  Outcome: Met   Goal Outcome Evaluation:                      "

## 2022-09-14 NOTE — PROGRESS NOTES
09/14/22 1450   Quick Adds   Type of Visit Initial Occupational Therapy Evaluation   Living Environment   People in Home spouse   Current Living Arrangements house   Home Accessibility stairs to enter home   Number of Stairs, Main Entrance 2   Stair Railings, Main Entrance none   Number of Stairs, Within Home, Primary greater than 10 stairs   Stair Railings, Within Home, Primary railing on left side (ascending)   Transportation Anticipated family or friend will provide   Living Environment Comments pt has office on upper level.  can live on main level.  Pt reports having moments of sl confusion at home   Self-Care   Usual Activity Tolerance good   Current Activity Tolerance moderate   Regular Exercise Yes   Activity/Exercise Type walking   Exercise Amount/Frequency daily;5 mins   Equipment Currently Used at Home none   Activity/Exercise/Self-Care Comment very limited activity since pt returned home.  Pt rarely walks and walks have been short.  Pt states he has mostly been laying in recliner with his feet elevated.  pt reports when he puts his legs down his wife gets concerned and wants him to elevate them again   Instrumental Activities of Daily Living (IADL)   Previous Responsibilities meal prep;housekeeping;laundry;shopping;yardwork;medication management;finances;driving;work   IADL Comments pt has not returned to any of this activites since surgery   General Information   Onset of Illness/Injury or Date of Surgery 09/13/22   Referring Physician Dr. Jimi Pruitt   Patient/Family Therapy Goal Statement (OT) return home and attend outpatient cardiac rehab.  pt states his first appointment was scheduled to be today   Existing Precautions/Restrictions sternal;cardiac   Left Upper Extremity (Weight-bearing Status) partial weight-bearing (PWB)   Right Upper Extremity (Weight-bearing Status) partial weight-bearing (PWB)   Cognitive Status Examination   Affect/Mental Status (Cognitive) WFL   Follows Commands WFL    Cognitive Status Comments pt reports having mild confusion/disorientation/brain fog at home.  Pt states he hasn't discussed with his care team.  encouraged pt to follow up with this.  Follows to most commands correctly but continues to need cuing for sternal precautions   Range of Motion Comprehensive   General Range of Motion no range of motion deficits identified   Strength Comprehensive (MMT)   General Manual Muscle Testing (MMT) Assessment no strength deficits identified   Coordination   Upper Extremity Coordination No deficits were identified   Bed Mobility   Bed Mobility supine-sit;sit-supine   Supine-Sit [place] (Bed Mobility) supervision;verbal cues   Sit-Supine [place] (Bed Mobility) independent   Comment (Bed Mobility) able to get out of bed indepedently but with increase effort, using rocking strategy and did not follow sternal precautoins   Transfers   Transfers bed-chair transfer   Transfer Skill: Bed to Chair/Chair to Bed   Bed-Chair [place] (Transfers) supervision   Sit-Stand Transfer   Sit-Stand [place] (Transfers) supervision   Activities of Daily Living   BADL Assessment/Intervention   (pt reports being able to don socks without problem)   Clinical Impression   Criteria for Skilled Therapeutic Interventions Met (OT) Yes, treatment indicated   OT Diagnosis decline in endurance and moblity   Influenced by the following impairments decline in functional capacity with increasing fatigue since CABG   OT Problem List-Impairments impacting ADL problems related to;activity tolerance impaired;post-surgical precautions;pain;mobility   Assessment of Occupational Performance 1-3 Performance Deficits   Identified Performance Deficits functional mobility with transfers and bed moblity.  endurance   Planned Therapy Interventions (OT) bed mobility training;progressive activity/exercise;home program guidelines;strengthening   Clinical Decision Making Complexity (OT) low complexity   Risk &  Benefits of therapy have been explained evaluation/treatment results reviewed;care plan/treatment goals reviewed;risks/benefits reviewed;current/potential barriers reviewed;participants voiced agreement with care plan;participants included;patient;spouse/significant other   OT Discharge Planning   OT Discharge Recommendation (DC Rec) home with outpatient cardiac rehab   Total Evaluation Time (Minutes)   Total Evaluation Time (Minutes) 10   OT Goals   Therapy Frequency (OT) 2 times/day   OT Predicted Duration/Target Date for Goal Attainment 09/19/22   OT: Understanding of cardiac education to maximize quality of life, condition management, and health outcomes Patient;Caregiver;Verbalize;Demonstrate   OT: Perform aerobic activity with stable cardiovascular response intermittent;ambulation;NuStep;20 minutes   OT: Functional/aerobic ambulation tolerance with stable cardiovascular response in order to return to home and community environment Supervision/SBA;Within precautions;Greater than 300 feet;Modified independent   OT: Navigation of stairs simulating home set up with stable cardiovascular response in order to return to home and community environment Greater than 10 stairs;Modified independent;Supervision/SBA

## 2022-09-14 NOTE — CONSULTS
"Brief CV Surgery Consult Note      Patient well known to our team as he had coronary artery bypass surgery on 9/2/2022. His hospital course was largely uncomplicated and he was discharged to home on 9/7/2022.    Unfortunately he developed nausea/dizziness that seemed to have spiraled over a couple of days, and patient continued to worsen. He presented to the ED due to the above.    Patient seen and examined by our team this morning. States he is feeling \"much better\" since admission. Vitals and labs WNL; WBC has normalized. Surgical incisions well-healing.       ASSESSMENT/PLAN:  - Patient doing well from surgical perspective; we do not have much to offer at this time.  - We will follow peripherally. Please reach out if questions concerns, or change in patient status.  - Follow-up with our team in place.      _______  Liv Madsen PA-C  Cardiothoracic Surgery  [phone]      "

## 2022-09-15 ENCOUNTER — APPOINTMENT (OUTPATIENT)
Dept: OCCUPATIONAL THERAPY | Facility: HOSPITAL | Age: 72
DRG: 205 | End: 2022-09-15
Payer: COMMERCIAL

## 2022-09-15 PROBLEM — J98.11 ATELECTASIS: Status: ACTIVE | Noted: 2022-09-15

## 2022-09-15 PROBLEM — I48.20 CHRONIC ATRIAL FIBRILLATION (H): Status: ACTIVE | Noted: 2022-09-15

## 2022-09-15 PROBLEM — I95.1 ORTHOSTATIC HYPOTENSION: Status: ACTIVE | Noted: 2022-09-15

## 2022-09-15 PROBLEM — I50.31 ACUTE HEART FAILURE WITH PRESERVED EJECTION FRACTION (HFPEF) (H): Status: ACTIVE | Noted: 2022-09-15

## 2022-09-15 LAB
ANION GAP SERPL CALCULATED.3IONS-SCNC: 7 MMOL/L (ref 5–18)
BUN SERPL-MCNC: 12 MG/DL (ref 8–28)
CALCIUM SERPL-MCNC: 9.1 MG/DL (ref 8.5–10.5)
CHLORIDE BLD-SCNC: 100 MMOL/L (ref 98–107)
CO2 SERPL-SCNC: 29 MMOL/L (ref 22–31)
CREAT SERPL-MCNC: 0.93 MG/DL (ref 0.7–1.3)
GFR SERPL CREATININE-BSD FRML MDRD: 88 ML/MIN/1.73M2
GLUCOSE BLD-MCNC: 147 MG/DL (ref 70–125)
GLUCOSE BLDC GLUCOMTR-MCNC: 110 MG/DL (ref 70–99)
GLUCOSE BLDC GLUCOMTR-MCNC: 129 MG/DL (ref 70–99)
GLUCOSE BLDC GLUCOMTR-MCNC: 136 MG/DL (ref 70–99)
GLUCOSE BLDC GLUCOMTR-MCNC: 169 MG/DL (ref 70–99)
GLUCOSE BLDC GLUCOMTR-MCNC: 200 MG/DL (ref 70–99)
GLUCOSE BLDC GLUCOMTR-MCNC: 245 MG/DL (ref 70–99)
MAGNESIUM SERPL-MCNC: 2 MG/DL (ref 1.8–2.6)
POTASSIUM BLD-SCNC: 3.8 MMOL/L (ref 3.5–5)
SODIUM SERPL-SCNC: 136 MMOL/L (ref 136–145)

## 2022-09-15 PROCEDURE — 36415 COLL VENOUS BLD VENIPUNCTURE: CPT | Performed by: INTERNAL MEDICINE

## 2022-09-15 PROCEDURE — 210N000001 HC R&B IMCU HEART CARE

## 2022-09-15 PROCEDURE — 258N000003 HC RX IP 258 OP 636: Performed by: INTERNAL MEDICINE

## 2022-09-15 PROCEDURE — 250N000013 HC RX MED GY IP 250 OP 250 PS 637: Performed by: INTERNAL MEDICINE

## 2022-09-15 PROCEDURE — 97530 THERAPEUTIC ACTIVITIES: CPT | Mod: GO

## 2022-09-15 PROCEDURE — 99232 SBSQ HOSP IP/OBS MODERATE 35: CPT | Performed by: INTERNAL MEDICINE

## 2022-09-15 PROCEDURE — 250N000011 HC RX IP 250 OP 636: Performed by: INTERNAL MEDICINE

## 2022-09-15 PROCEDURE — 82310 ASSAY OF CALCIUM: CPT | Performed by: INTERNAL MEDICINE

## 2022-09-15 PROCEDURE — 250N000012 HC RX MED GY IP 250 OP 636 PS 637: Performed by: INTERNAL MEDICINE

## 2022-09-15 PROCEDURE — 83735 ASSAY OF MAGNESIUM: CPT | Performed by: INTERNAL MEDICINE

## 2022-09-15 PROCEDURE — 97110 THERAPEUTIC EXERCISES: CPT | Mod: GO

## 2022-09-15 RX ORDER — FUROSEMIDE 20 MG
20 TABLET ORAL
Status: DISCONTINUED | OUTPATIENT
Start: 2022-09-15 | End: 2022-09-15

## 2022-09-15 RX ORDER — POTASSIUM CHLORIDE 750 MG/1
10 TABLET, EXTENDED RELEASE ORAL DAILY
Qty: 7 TABLET | Refills: 0 | Status: SHIPPED | OUTPATIENT
Start: 2022-09-15 | End: 2022-09-15

## 2022-09-15 RX ORDER — METOPROLOL TARTRATE 25 MG/1
25 TABLET, FILM COATED ORAL 2 TIMES DAILY
Status: DISCONTINUED | OUTPATIENT
Start: 2022-09-15 | End: 2022-09-16 | Stop reason: HOSPADM

## 2022-09-15 RX ORDER — FUROSEMIDE 20 MG
TABLET ORAL
Qty: 13 TABLET | Refills: 0 | Status: SHIPPED | OUTPATIENT
Start: 2022-09-15 | End: 2022-09-15

## 2022-09-15 RX ADMIN — Medication 100 MCG: at 08:36

## 2022-09-15 RX ADMIN — SODIUM CHLORIDE 500 ML: 9 INJECTION, SOLUTION INTRAVENOUS at 11:05

## 2022-09-15 RX ADMIN — METFORMIN HYDROCHLORIDE 1000 MG: 500 TABLET ORAL at 16:06

## 2022-09-15 RX ADMIN — AMIODARONE HYDROCHLORIDE 200 MG: 200 TABLET ORAL at 08:39

## 2022-09-15 RX ADMIN — FINASTERIDE 5 MG: 5 TABLET, FILM COATED ORAL at 08:37

## 2022-09-15 RX ADMIN — ENOXAPARIN SODIUM 40 MG: 40 INJECTION SUBCUTANEOUS at 20:10

## 2022-09-15 RX ADMIN — ATORVASTATIN CALCIUM 80 MG: 40 TABLET, FILM COATED ORAL at 08:29

## 2022-09-15 RX ADMIN — ASPIRIN 81 MG CHEWABLE TABLET 162 MG: 81 TABLET CHEWABLE at 08:29

## 2022-09-15 RX ADMIN — SODIUM CHLORIDE 500 ML: 9 INJECTION, SOLUTION INTRAVENOUS at 13:17

## 2022-09-15 RX ADMIN — POTASSIUM CHLORIDE 10 MEQ: 750 TABLET, EXTENDED RELEASE ORAL at 08:37

## 2022-09-15 RX ADMIN — METOPROLOL TARTRATE 25 MG: 25 TABLET, FILM COATED ORAL at 20:10

## 2022-09-15 RX ADMIN — AMIODARONE HYDROCHLORIDE 200 MG: 200 TABLET ORAL at 20:10

## 2022-09-15 RX ADMIN — METOPROLOL TARTRATE 37.5 MG: 25 TABLET, FILM COATED ORAL at 08:35

## 2022-09-15 RX ADMIN — GLIPIZIDE 5 MG: 5 TABLET, EXTENDED RELEASE ORAL at 16:07

## 2022-09-15 RX ADMIN — FUROSEMIDE 20 MG: 10 INJECTION, SOLUTION INTRAMUSCULAR; INTRAVENOUS at 06:06

## 2022-09-15 RX ADMIN — FLUOXETINE 60 MG: 20 CAPSULE ORAL at 08:36

## 2022-09-15 RX ADMIN — Medication 1 CAPSULE: at 08:43

## 2022-09-15 RX ADMIN — TAMSULOSIN HYDROCHLORIDE 0.4 MG: 0.4 CAPSULE ORAL at 20:10

## 2022-09-15 RX ADMIN — GLIPIZIDE 5 MG: 5 TABLET, EXTENDED RELEASE ORAL at 08:29

## 2022-09-15 RX ADMIN — INSULIN ASPART 1 UNITS: 100 INJECTION, SOLUTION INTRAVENOUS; SUBCUTANEOUS at 12:37

## 2022-09-15 ASSESSMENT — ACTIVITIES OF DAILY LIVING (ADL)
ADLS_ACUITY_SCORE: 25
ADLS_ACUITY_SCORE: 28
ADLS_ACUITY_SCORE: 25
ADLS_ACUITY_SCORE: 29
ADLS_ACUITY_SCORE: 25
ADLS_ACUITY_SCORE: 28
ADLS_ACUITY_SCORE: 28
ADLS_ACUITY_SCORE: 25
ADLS_ACUITY_SCORE: 25

## 2022-09-15 NOTE — PROGRESS NOTES
On ambulation patient became lightheaded.  Blood pressures were orthostatic at 102/56 sitting, 98/50 standing, 85/53 walking.  Given this with sinus rhythm, will lower dose of metoprolol to 25 mg by mouth twice a day.  In addition we will discontinue oral furosemide.  Suspect continuing to keep in hospital for 1 more day would be reasonable.

## 2022-09-15 NOTE — PROGRESS NOTES
[facility]    PROGRESS NOTE - Hospitalist Service    Assessment and Plan    Active Problems:    WATTERS (dyspnea on exertion)    CAD (coronary artery disease)    Fatigue    Near syncope    Acute heart failure with preserved ejection fraction (HFpEF) (H)    Atelectasis    Chronic atrial fibrillation (H)    Bala Hopper is a 71 year old male with h/o DM-2, hyperlipidemia, CAD, s/p CABG x 2 on 9/2/22, DANIELLE and BPH who presented to our ED for evaluation of fatigue, weakness, shortness of breath and fall.      Fatigue, weakness and shortness of breath  - no findings to support  PNA since afebrile, normal procal, WBC slightly up but more likely acute phase reactant from atelectasis   - reviewed CT   - stopped IV abx  - flutter valve  - cards started low dose lasix but stop now since pressures down    Orthostatic hypotension  - given 500ml fluid bolus and still low and getting additional  - cards decreased BB  - stopped lasix  - monitor overnight      Fall:  -- Due to weakness. None further   -- CT head- no acute intracranial processes. The ventricles are disproportionally enlarged raising the suspicion of NPH  -- PT/OT     CAD   S/P recent CABG x 2  -- Denies any new chest pain. Trop is negative. EKG shows NSR with inferior infarct, age undetermined. Trend trops  -- Tiny left effusion is likely common after the CABG.   -- Echo from 8/24/22 with normal EF of 55-60%. Clinically doesn't appear vol up. BNP is 190. No prior BNP for comparison  -- BB decreased as above,  statin, aspirin and amiodarone.   - CVS seen nothing to add  - repeat ECHO     DM-2:  -- Last A1c  6.1 as of 8/26/22  -- metformin on hold will resume   - continue glipizide  - lantus never reordered on admission and BG stable so hold for now  - Novolog sliding scale while here     Hyperlipidemia:  -- Cont statin as above. Check total CK     BPH:  -- Cont with home meds     Hyponatremia:  Resolved       Echo  The visual ejection fraction  is estimated at 55%. The left ventricle is  normal in size. There is borderline concentric left ventricular hypertrophy.  Flattened septum is consistent with RV pressure overload.  2. The right ventricle is mildly dilated. Mildly decreased right ventricular  systolic function  3. Small pericardial effusion. There are no echocardiographic indications of  cardiac tamponade. Normal RA pressure of 3 mmHg.    COVID-19 PCR Results    COVID-19 PCR Results 8/30/22 9/13/22   SARS CoV2 PCR Negative Negative      Comments are available for some flowsheets but are not being displayed.         COVID-19 Antibody Results, Testing for Immunity    COVID-19 Antibody Results, Testing for Immunity   No data to display.            VTE prophylaxis:  Enoxaparin (Lovenox) SQ  DIET: Orders Placed This Encounter      Combination Diet Regular Diet Adult; Moderate Consistent Carb (60 g CHO per Meal) Diet      Diet    Drains/Lines: none  Weight bearing status: WBAT  Disposition/Barriers to discharge: anticipated tomorrow with medication adjustments   Code Status: Full Code    Subjective:  Was feeling well this morning and then when up with therapy was dizzy and lightheaded Bps low and cancelled discharge     PHYSICAL EXAM  Temp:  [97.9  F (36.6  C)-98.6  F (37  C)] 97.9  F (36.6  C)  Pulse:  [64-74] 74  Resp:  [16-20] 18  BP: (109-122)/(55-87) 120/87  SpO2:  [93 %-95 %] 93 %  Wt Readings from Last 1 Encounters:   09/15/22 88.2 kg (194 lb 8 oz)       Intake/Output Summary (Last 24 hours) at 9/14/2022 1455  Last data filed at 9/14/2022 1342  Gross per 24 hour   Intake 478 ml   Output 1100 ml   Net -622 ml      Body mass index is 24.97 kg/m .  Physical Exam  Constitutional:       Appearance: Normal appearance.   Cardiovascular:      Rate and Rhythm: Normal rate.      Pulses: Normal pulses.      Comments: Sternotomy scare healing   Pulmonary:      Effort: Pulmonary effort is normal.      Breath sounds: Normal breath sounds.   Abdominal:       General: Bowel sounds are normal.      Palpations: Abdomen is soft.   Musculoskeletal:      Right lower leg: No edema.      Left lower leg: No edema.   Skin:     General: Skin is warm.      Capillary Refill: Capillary refill takes less than 2 seconds.   Neurological:      General: No focal deficit present.      Mental Status: He is alert and oriented to person, place, and time. Mental status is at baseline.         PERTINENT LABS/IMAGING:  Results for orders placed or performed during the hospital encounter of 09/13/22   CT Head w/o Contrast    Impression    IMPRESSION:  1.  No acute intracranial abnormality.    2.  Moderate to marked prominence of the bilateral lateral ventricles and third ventricle appears disproportionate to the background mild diffuse parenchymal volume loss. In the appropriate clinical setting, a component of normal pressure   hydrocephalus/communicating hydrocephalus should be considered.   XR Chest 2 Views    Impression    IMPRESSION: The heart is normal in size. Left basilar atelectasis and or infiltrate and small left pleural effusion are identified. Median sternotomy wires are identified. No pneumothorax.   Chest CT w/o contrast    Impression    IMPRESSION:   1.  Small to moderate left and small right pleural effusions are present with related atelectasis. There are no specific findings to suggest a superimposed pneumonia.  2.  Status post recent median sternotomy and coronary revascularization. There is expected fluid and air in the anterior mediastinum.     XR Abdomen 2 Views    Impression    IMPRESSION: There are small, bilateral pleural effusions left greater than right with associated left lower lobe opacities as seen on the chest CT. Poststernotomy changes.    No free air on the upright view. Bowel gas pattern is notable for a paucity of stool. No abnormally dilated loops of bowel. Numerous phleboliths in the pelvis. There is mild lumbar rotoscoliosis convex to the left centered at  L3.   Echocardiogram Limited   Result Value Ref Range    LVEF  55%        Recent Labs   Lab 09/15/22  1233 09/15/22  1025 09/15/22  0843 09/14/22  0808 09/14/22  0507 09/13/22  1925 09/13/22  1243   WBC  --   --   --   --  8.7  --  13.3*   HGB  --   --   --   --  10.5*  --  9.7*   MCV  --   --   --   --  94  --  93   PLT  --   --   --   --  585*  --  688*   INR  --   --   --   --   --   --  1.01   NA  --   --  136  --  136  --  131*   POTASSIUM  --   --  3.8  --  4.0  --  4.4   CHLORIDE  --   --  100  --  105  --  100   CO2  --   --  29  --  24  --  20*   BUN  --   --  12  --  13  --  19   CR  --   --  0.93  --  0.86  --  0.87   ANIONGAP  --   --  7  --  7  --  11   EVELYNE  --   --  9.1  --  8.4*  --  8.6   * 245* 147*   < > 71   < > 98   ALBUMIN  --   --   --   --  2.7*  --  2.9*   PROTTOTAL  --   --   --   --  5.8*  --  6.1   BILITOTAL  --   --   --   --  0.7  --  0.6   ALKPHOS  --   --   --   --  101  --  110   ALT  --   --   --   --  27  --  32   AST  --   --   --   --  20  --  21    < > = values in this interval not displayed.     No results found for this or any previous visit (from the past 24 hour(s)).  Recent Labs   Lab Test 08/10/22  0710   CHOL 113   HDL 31*   LDL 69   TRIG 64     Recent Labs   Lab Test 08/10/22  0710 11/04/21  0914 09/29/20  1653   LDL 69 140* 83     Recent Labs   Lab Test 09/14/22  1140 09/14/22  0808 09/14/22  0507   NA  --   --  136   POTASSIUM  --   --  4.0   CHLORIDE  --   --  105   CO2  --   --  24   *   < > 71   BUN  --   --  13   CR  --   --  0.86   GFRESTIMATED  --   --  >90   EVELYNE  --   --  8.4*    < > = values in this interval not displayed.     Recent Labs   Lab Test 08/26/22  0905   A1C 6.1*     Recent Labs   Lab Test 09/14/22  0507 09/13/22  1243 09/05/22  0438   HGB 10.5* 9.7* 9.9*     Recent Labs   Lab Test 09/14/22  0507 09/13/22  2334 09/13/22  1243   TROPONINI 0.03 0.02 0.03     Recent Labs   Lab Test 09/13/22  1243   *     Recent Labs   Lab Test  09/17/21  1228   TSH 2.12     Recent Labs   Lab Test 09/13/22  1243 09/02/22  1200 09/02/22  1113   INR 1.01 1.37* 1.43*       Diamond Arceo MD  [facility] Medicine Service  [phone]

## 2022-09-15 NOTE — PLAN OF CARE
Pt A&Ox4, VSS, RA. Up with assist x1, FWW, GB. Voiding without difficulty. No BM this shift. Appetite improving. DM2, well controlled no sliding scale insulin needed. Discharge Plan: Likely home today 09/15/2022 should Pt remain clinically stable. No additional needs or concerns verbalized or identified.   Problem: Fall Injury Risk  Goal: Absence of Fall and Fall-Related Injury  Outcome: Ongoing, Progressing  Intervention: Identify and Manage Contributors  Recent Flowsheet Documentation  Taken 9/14/2022 2015 by Alesia Stoddard, RN  Medication Review/Management: medications reviewed  Intervention: Promote Injury-Free Environment  Recent Flowsheet Documentation  Taken 9/14/2022 2015 by Alesia Stoddard, RN  Safety Promotion/Fall Prevention:   activity supervised   assistive device/personal items within reach   bed alarm on   fall prevention program maintained   safety round/check completed   nonskid shoes/slippers when out of bed     Problem: Risk for Delirium  Goal: Optimal Coping  Outcome: Adequate for Care Transition  Goal: Improved Behavioral Control  Outcome: Adequate for Care Transition  Intervention: Prevent and Manage Agitation  Recent Flowsheet Documentation  Taken 9/14/2022 2015 by Alesia Stoddard, RN  Environment Familiarity/Consistency: daily routine followed  Goal: Improved Attention and Thought Clarity  Outcome: Adequate for Care Transition  Goal: Improved Sleep  Outcome: Adequate for Care Transition   Goal Outcome Evaluation:

## 2022-09-15 NOTE — PROGRESS NOTES
CARDIOLOGY PROGRESS NOTE      Assessment/Plan:  1.  Shortness of breath-presumed pneumonia based on CT scan although this could be atelectasis following surgery.  No increased white count or  procalcitonin.  Cultures negative thus far.  Doubt pneumonia, antibiotics stopped.   2.  Heart failure-suspect acute in the setting of preserved ejection fraction due to postoperative fluid accumulation.    With IV diuresis weight down 2 kg, oxygen saturation 93% on room air and preserved creatinine.  With switch over to furosemide 20 mg p.o. twice daily orally for 3 days then 20 mg a day for a week and then discontinue.  3.  Pneumonia-as above doubtful based on blood studies.  4.  CAD (coronary artery disease)-angiography 2022 showed 20% ostial left main, proximal LAD 25% stenosis with a mid total occlusion, second diagonal with an 80% stenosis.  Circumflex had a proximal 20% followed by mid 50% lesion and the right coronary artery had a distal  25% lesion, this prompted coronary artery bypass grafting.  5.  Coronary artery bypass grafting-  he had a LIMA to the LAD and a vein graft to the diagonal.  6.  Atrial fibrillation-postop, on amiodarone.  Probably utilize for only 6 months.  No recurrences seen on telemetry.   7.  Pericardial fusion-none really seen on echo with no signs of tamponade.  8.  Mild right ventricular systolic dysfunction- recheck in about 6 to 8 weeks, could be due to atelectasis.    Plan:  1.  Switch to oral diuretic, furosemide 20 mg p.o. twice daily for 3 days and 20 mg p.o. daily for a week then discontinue.  2.  Have no issues with discharge home today.    Discharge Plannin.  Home today.  2.  Will need renal profile on Monday given diuretic.  3.  Can follow with Dr. Vijay Grande primary cardiologist as well as heart failure nurse practitioners.  4.  Will need dose of amiodarone lowered to 200 mg a day in 1 week.  5.  Recheck echo in about 2 months.     LOS: 2 days  "    Subjective:  Interval History:    71-year-old gentleman seen being on third day of hospitalization.  Feels well, wants to go home, denies any syncope, dizziness, fatigue, chest pains, palpitations, shortness of breath, PND, orthopnea or peripheral edema.    Medications    amiodarone  200 mg Oral BID     aspirin  162 mg Oral or NG Tube Daily     atorvastatin  80 mg Oral Daily     enoxaparin ANTICOAGULANT  40 mg Subcutaneous Q24H     finasteride  5 mg Oral Daily     FLUoxetine  60 mg Oral Daily     furosemide  20 mg Intravenous Q12H     glipiZIDE  5 mg Oral BID AC     insulin aspart  1-7 Units Subcutaneous TID AC     lactobacillus rhamnosus (GG)  1 capsule Oral Daily     metoprolol tartrate  37.5 mg Oral BID     polyethylene glycol  17 g Oral Daily     potassium chloride ER  10 mEq Oral Daily     sodium chloride (PF)  3 mL Intracatheter Q8H     tamsulosin  0.4 mg Oral QPM     vitamin D3  100 mcg Oral Daily         Objective:   Vital signs in last 24 hours:  Temp:  [97.9  F (36.6  C)-98.6  F (37  C)] 97.9  F (36.6  C)  Pulse:  [64-74] 74  Resp:  [16-20] 18  BP: (108-122)/(55-87) 120/87  SpO2:  [93 %-95 %] 93 %      Physical Exam:  /87   Pulse 74   Temp 97.9  F (36.6  C) (Oral)   Resp 18   Ht 1.88 m (6' 2\")   Wt 88.2 kg (194 lb 8 oz)   SpO2 93%   BMI 24.97 kg/m      General Appearance:    Alert, cooperative, no distress, appears stated age   Head:    Normocephalic, without obvious abnormality, atraumatic   Throat:   Lips, mucosa, and tongue normal; teeth and gums normal   Neck:   Supple, symmetrical, trachea midline, no adenopathy;        thyroid:  No enlargement/tenderness/nodules; no carotid    bruit or JVD   Back:     Symmetric, no curvature, ROM normal, no CVA tenderness   Lungs:     Clear to auscultation bilaterally, respirations unlabored   Chest wall:    No tenderness, midline sternotomy scar   Heart:    Regular rate and rhythm, S1 and S2 normal, no murmur, rub   or gallop   Abdomen:     Soft, " non-tender, bowel sounds active all four quadrants,     no masses, no organomegaly   Extremities:   Normal, atraumatic, no cyanosis or edema   Pulses:   2+ and symmetric all extremities   Skin:   Skin color, texture, turgor normal, no rashes or lesions     Cardiographics:      ECG: Personally reviewed by myself shows sinus rhythm, low voltage, leftward axis, no acute changes.    Echocardiogram:   1. The visual ejection fraction is estimated at 55%. The left ventricle is normal in size. There is borderline concentric left ventricular hypertrophy. Flattened septum is consistent with RV pressure overload.  2. The right ventricle is mildly dilated. Mildly decreased right ventricular systolic function  3. Small pericardial effusion. There are no echocardiographic indications of cardiac tamponade. Normal RA pressure of 3 mmHg.    Lab Results:   Recent Labs   Lab 09/14/22  0507   WBC 8.7   HGB 10.5*   HCT 31.7*   *     Recent Labs   Lab 09/14/22  0507      CO2 24   BUN 13   .       Lab Results   Component Value Date    TROPONINI 0.03 09/14/2022

## 2022-09-15 NOTE — PROGRESS NOTES
Care Management Discharge Note    Discharge Date: 09/15/2022       Discharge Disposition: Other (Comments), Home (OP Cardiac Rehab)    Discharge Services: OP Cardiac Rehab    Discharge DME: None    Discharge Transportation: family or friend will provide    Private pay costs discussed: Not applicable     Education Provided on the Discharge Plan:  Yes  Persons Notified of Discharge Plans: MD, RN, Patient and family  Patient/Family in Agreement with the Plan: yes    Handoff Referral Completed: Yes    Additional Information:  Patient is  and largely independent with activities of daily living at baseline. Patient will discharge home with outpatient cardiac rehab. Family will transport.     1:30 PM  Discharge for today cancelled.         Carie Gutierres

## 2022-09-15 NOTE — PLAN OF CARE
Goal Outcome Evaluation:  Heart Failure Care Map  GOALS TO BE MET BEFORE DISCHARGE:    1. Decrease congestion and/or edema with diuretic therapy to achieve near optimal volume status.     Dyspnea improved: Yes, satisfactory for discharge.   Edema improved: Yes, satisfactory for discharge.        Net I/O and Weights since admission:   08/16 1500 - 09/15 1459  In: 968 [P.O.:968]  Out: 1650 [Urine:1650]  Net: -682     Vitals:    09/13/22 1118 09/13/22 1845 09/14/22 0412 09/15/22 0205   Weight: 90.7 kg (200 lb) 90.9 kg (200 lb 6.4 oz) 89.1 kg (196 lb 6.2 oz) 88.2 kg (194 lb 8 oz)       2.  O2 sats > 90% on room air, or at prior home O2 therapy level.      Able to wean O2 this shift to keep sats above 90%?: Yes, satisfactory for discharge.   Does patient use Home O2? No          Current oxygenation status:   SpO2: 93 %     O2 Device: None (Room air),      3.  Tolerates ambulation and mobility near baseline.     Ambulation: Yes, satisfactory for discharge.   Times patient ambulated with staff this shift: 5    Please review the Heart Failure Care Map for additional HF goal outcomes.    Bernard Pisano RN  9/15/2022        Pt reported feeling lightheaded and dizzy when standing.  Pt was working with therapy and again felt lightheaded and dizzy.  Orthostatics were taken pt was 85/53 when standing.  MD was updated and cardiology was paged.  Bolus was ordered and repeat orthostatics remained low.  Pt was given second bolus and metoprolol was decreased with lasix discontinue.   Pt denies symptoms with walking this afternoon.  Discharge was postponed as we continue to monitor.

## 2022-09-16 ENCOUNTER — APPOINTMENT (OUTPATIENT)
Dept: OCCUPATIONAL THERAPY | Facility: HOSPITAL | Age: 72
DRG: 205 | End: 2022-09-16
Payer: COMMERCIAL

## 2022-09-16 VITALS
RESPIRATION RATE: 18 BRPM | BODY MASS INDEX: 24.96 KG/M2 | OXYGEN SATURATION: 93 % | SYSTOLIC BLOOD PRESSURE: 97 MMHG | HEIGHT: 74 IN | DIASTOLIC BLOOD PRESSURE: 53 MMHG | WEIGHT: 194.5 LBS | HEART RATE: 64 BPM | TEMPERATURE: 98.1 F

## 2022-09-16 LAB
ANION GAP SERPL CALCULATED.3IONS-SCNC: 7 MMOL/L (ref 5–18)
BUN SERPL-MCNC: 16 MG/DL (ref 8–28)
CALCIUM SERPL-MCNC: 8.6 MG/DL (ref 8.5–10.5)
CHLORIDE BLD-SCNC: 105 MMOL/L (ref 98–107)
CO2 SERPL-SCNC: 23 MMOL/L (ref 22–31)
CREAT SERPL-MCNC: 0.76 MG/DL (ref 0.7–1.3)
GFR SERPL CREATININE-BSD FRML MDRD: >90 ML/MIN/1.73M2
GLUCOSE BLD-MCNC: 99 MG/DL (ref 70–125)
GLUCOSE BLDC GLUCOMTR-MCNC: 126 MG/DL (ref 70–99)
HGB BLD-MCNC: 10.5 G/DL (ref 13.3–17.7)
MAGNESIUM SERPL-MCNC: 1.9 MG/DL (ref 1.8–2.6)
POTASSIUM BLD-SCNC: 4 MMOL/L (ref 3.5–5)
SODIUM SERPL-SCNC: 135 MMOL/L (ref 136–145)

## 2022-09-16 PROCEDURE — 97110 THERAPEUTIC EXERCISES: CPT | Mod: GO

## 2022-09-16 PROCEDURE — 80048 BASIC METABOLIC PNL TOTAL CA: CPT | Performed by: INTERNAL MEDICINE

## 2022-09-16 PROCEDURE — 36415 COLL VENOUS BLD VENIPUNCTURE: CPT | Performed by: INTERNAL MEDICINE

## 2022-09-16 PROCEDURE — 250N000013 HC RX MED GY IP 250 OP 250 PS 637: Performed by: INTERNAL MEDICINE

## 2022-09-16 PROCEDURE — 99239 HOSP IP/OBS DSCHRG MGMT >30: CPT | Performed by: INTERNAL MEDICINE

## 2022-09-16 PROCEDURE — 99232 SBSQ HOSP IP/OBS MODERATE 35: CPT | Performed by: INTERNAL MEDICINE

## 2022-09-16 PROCEDURE — 97530 THERAPEUTIC ACTIVITIES: CPT | Mod: GO

## 2022-09-16 PROCEDURE — 85018 HEMOGLOBIN: CPT | Performed by: INTERNAL MEDICINE

## 2022-09-16 PROCEDURE — 83735 ASSAY OF MAGNESIUM: CPT | Performed by: INTERNAL MEDICINE

## 2022-09-16 RX ORDER — METOPROLOL TARTRATE 25 MG/1
25 TABLET, FILM COATED ORAL 2 TIMES DAILY
Qty: 60 TABLET | Refills: 0 | Status: SHIPPED | OUTPATIENT
Start: 2022-09-16 | End: 2022-10-10

## 2022-09-16 RX ADMIN — Medication 1 CAPSULE: at 08:53

## 2022-09-16 RX ADMIN — ASPIRIN 81 MG CHEWABLE TABLET 162 MG: 81 TABLET CHEWABLE at 08:44

## 2022-09-16 RX ADMIN — AMIODARONE HYDROCHLORIDE 200 MG: 200 TABLET ORAL at 08:42

## 2022-09-16 RX ADMIN — METOPROLOL TARTRATE 25 MG: 25 TABLET, FILM COATED ORAL at 08:43

## 2022-09-16 RX ADMIN — Medication 100 MCG: at 08:42

## 2022-09-16 RX ADMIN — METFORMIN HYDROCHLORIDE 1000 MG: 500 TABLET ORAL at 08:44

## 2022-09-16 RX ADMIN — FLUOXETINE 60 MG: 20 CAPSULE ORAL at 08:42

## 2022-09-16 RX ADMIN — GLIPIZIDE 5 MG: 5 TABLET, EXTENDED RELEASE ORAL at 08:45

## 2022-09-16 RX ADMIN — FINASTERIDE 5 MG: 5 TABLET, FILM COATED ORAL at 08:44

## 2022-09-16 RX ADMIN — POTASSIUM CHLORIDE 10 MEQ: 750 TABLET, EXTENDED RELEASE ORAL at 08:43

## 2022-09-16 RX ADMIN — ATORVASTATIN CALCIUM 80 MG: 40 TABLET, FILM COATED ORAL at 08:43

## 2022-09-16 ASSESSMENT — ACTIVITIES OF DAILY LIVING (ADL)
ADLS_ACUITY_SCORE: 25

## 2022-09-16 NOTE — PLAN OF CARE
Problem: Fall Injury Risk  Goal: Absence of Fall and Fall-Related Injury  Outcome: Ongoing, Progressing  Intervention: Promote Injury-Free Environment  Recent Flowsheet Documentation  Taken 9/15/2022 3272 by Farhan Ferris RN  Safety Promotion/Fall Prevention:   activity supervised   bed alarm on   clutter free environment maintained   fall prevention program maintained   nonskid shoes/slippers when out of bed   patient and family education   safety round/check completed   supervised activity   room organization consistent    No acute issues overnight.  VSS.  Pt denied any dizziness or lightheadedness at rest.

## 2022-09-16 NOTE — PLAN OF CARE
Problem: Fall Injury Risk  Goal: Absence of Fall and Fall-Related Injury  Outcome: Ongoing, Progressing     No c/o pain or discomfort. Walked in the hallway. NSR w/ 1st degree AV block. VSS

## 2022-09-16 NOTE — PLAN OF CARE
Cardiac Rehab Discharge Summary    Reason for therapy discharge:    Discharged to home with outpatient therapy.    Progress towards therapy goal(s). See goals on Care Plan in [facility] electronic health record for goal details.  Goals met    Therapy recommendation(s):    Continued therapy is recommended.  Rationale/Recommendations:  pt would benefit from outpatient cardiac rehab for further monitoring and assessment.  Continue home exercise program.

## 2022-09-16 NOTE — PROGRESS NOTES
CARDIOLOGY PROGRESS NOTE      Assessment/Plan:  1.  Shortness of breath- doubt pneumonia based on CT scan although this could be atelectasis following surgery.  No increased white count or  procalcitonin.  Cultures negative thus far. Antibiotics stopped.   2.  Heart failure-suspect acute in the setting of preserved ejection fraction due to postoperative fluid accumulation.    With IV diuresis weight down 2 kg, oxygen saturation 93% on room air and preserved creatinine.  Given orthostasis we will not use any more diuretic.   3.  Orthostasis-no more diuretic and backed off on dose of medications and feeling better today.  4.  CAD (coronary artery disease)-angiography 2022 showed 20% ostial left main, proximal LAD 25% stenosis with a mid total occlusion, second diagonal with an 80% stenosis.  Circumflex had a proximal 20% followed by mid 50% lesion and the right coronary artery had a distal  25% lesion, this prompted coronary artery bypass grafting.  5.  Coronary artery bypass grafting-  he had a LIMA to the LAD and a vein graft to the diagonal.  6.  Atrial fibrillation-postop, on amiodarone.  Probably utilize for only 6 months.  No recurrences seen on telemetry.   No anticoagulant.  7.  Pericardial effusion-none really seen on echo with no signs of tamponade.  8.  Mild right ventricular systolic dysfunction- recheck in about 6 to 8 weeks, could be due to atelectasis.    Plan:  1.  Have no issues with discharge home today.    Discharge Plannin.  Home today.  2.  Can follow with Dr. Vijay Grande primary cardiologist as well as heart failure nurse practitioners.  3.  Will need dose of amiodarone lowered to 200 mg a day in 1 week.  4.  Recheck echo in about 2 months.     LOS: 3 days     Subjective:  Interval History:    71-year-old gentleman seen being on fourth day of hospitalization.  After I saw him yesterday became orthostatic so we kept him overnight, stopped his diuretic, lower dose of  "metoprolol.  Today, feels well, wants to go home, denies any syncope, dizziness, fatigue, chest pains, palpitations, shortness of breath, PND, orthopnea or peripheral edema.    Medications    amiodarone  200 mg Oral BID     aspirin  162 mg Oral or NG Tube Daily     atorvastatin  80 mg Oral Daily     enoxaparin ANTICOAGULANT  40 mg Subcutaneous Q24H     finasteride  5 mg Oral Daily     FLUoxetine  60 mg Oral Daily     glipiZIDE  5 mg Oral BID AC     insulin aspart  1-7 Units Subcutaneous TID AC     lactobacillus rhamnosus (GG)  1 capsule Oral Daily     metFORMIN  1,000 mg Oral BID w/meals     metoprolol tartrate  25 mg Oral BID     polyethylene glycol  17 g Oral Daily     potassium chloride ER  10 mEq Oral Daily     sodium chloride (PF)  3 mL Intracatheter Q8H     tamsulosin  0.4 mg Oral QPM     vitamin D3  100 mcg Oral Daily       Objective:   Vital signs in last 24 hours:  Temp:  [97.3  F (36.3  C)-98.4  F (36.9  C)] 98.1  F (36.7  C)  Pulse:  [60-71] 64  Resp:  [18-20] 18  BP: ()/(52-68) 97/53  SpO2:  [93 %-98 %] 93 %      Physical Exam:  BP 97/53 (BP Location: Left arm, Patient Position: Standing, Cuff Size: Adult Regular)   Pulse 64   Temp 98.1  F (36.7  C) (Oral)   Resp 18   Ht 1.88 m (6' 2\")   Wt 88.2 kg (194 lb 8 oz)   SpO2 93%   BMI 24.97 kg/m      General Appearance:    Alert, cooperative, no distress, appears stated age   Head:    Normocephalic, without obvious abnormality, atraumatic   Throat:   Lips, mucosa, and tongue normal; teeth and gums normal   Neck:   Supple, symmetrical, trachea midline, no adenopathy;        thyroid:  No enlargement/tenderness/nodules; no carotid    bruit or JVD   Back:     Symmetric, no curvature, ROM normal, no CVA tenderness   Lungs:     Clear to auscultation bilaterally, respirations unlabored   Chest wall:    No tenderness, midline sternotomy scar   Heart:    Regular rate and rhythm, S1 and S2 normal, no murmur, rub   or gallop   Abdomen:     Soft, non-tender, " bowel sounds active all four quadrants,     no masses, no organomegaly   Extremities:   Normal, atraumatic, no cyanosis or edema   Pulses:   2+ and symmetric all extremities   Skin:   Skin color, texture, turgor normal, no rashes or lesions     Cardiographics:      ECG: Personally reviewed by myself shows sinus rhythm, low voltage, leftward axis, no acute changes.    Echocardiogram:   1. The visual ejection fraction is estimated at 55%. The left ventricle is normal in size. There is borderline concentric left ventricular hypertrophy. Flattened septum is consistent with RV pressure overload.  2. The right ventricle is mildly dilated. Mildly decreased right ventricular systolic function  3. Small pericardial effusion. There are no echocardiographic indications of cardiac tamponade. Normal RA pressure of 3 mmHg.    Lab Results:   Recent Labs   Lab 09/16/22 0423 09/14/22  0507   WBC  --  8.7   HGB 10.5* 10.5*   HCT  --  31.7*   PLT  --  585*     Recent Labs   Lab 09/16/22 0423   *   CO2 23   BUN 16   .       Lab Results   Component Value Date    TROPONINI 0.03 09/14/2022

## 2022-09-16 NOTE — DISCHARGE SUMMARY
[facility]  Hospitalist Discharge Summary      Date of Admission:  9/13/2022  Date of Discharge:  9/16/2022  Discharging Provider: Diamond Arceo MD  Discharge Service: Hospitalist Service    Discharge Diagnoses   Active Problems:    WATTERS (dyspnea on exertion)    CAD (coronary artery disease)    Fatigue    Near syncope    Acute heart failure with preserved ejection fraction (HFpEF) (H)    Atelectasis    Chronic atrial fibrillation (H)    Orthostatic hypotension        Follow-ups Needed After Discharge   Follow-up Appointments     Add follow up information to the AVS prior to printing          Unresulted Labs Ordered in the Past 30 Days of this Admission     Date and Time Order Name Status Description    9/2/2022  5:45 AM Prepare red blood cells (unit) Preliminary     9/2/2022  5:45 AM Prepare red blood cells (unit) Preliminary           Discharge Disposition   Discharged to home  With HC  Condition at discharge: Stable      Hospital Course   Bala Hopper is a 71 year old male with h/o DM-2, hyperlipidemia, CAD, s/p CABG x 2 on 9/2/22, DANIELLE and BPH who presented to our ED for evaluation of fatigue, weakness, shortness of breath and fall.      Fatigue, weakness and shortness of breath  - no findings to support  PNA since afebrile, normal procal, WBC slightly up but more likely acute phase reactant from atelectasis   - reviewed CT   - stopped IV abx and continued improvement   - flutter valve  - cards started low dose lasix but stop now since pressures down     Orthostatic hypotension discharge cancelled due to symptomatic orthostatic hypotension   - given 500ml fluid bolus x2  - cards decreased BB  - stopped lasix  - symptoms improved overnight  - repeat orthos in morning stable      Fall:  -- Due to weakness. None further   -- CT head- no acute intracranial processes. The ventricles are disproportionally enlarged raising the suspicion of NPH  -- PT/OT     CAD   S/P recent CABG x 2  --  Denies any new chest pain. Trop is negative. EKG shows NSR with inferior infarct, age undetermined. Trend trops  -- Tiny left effusion is likely common after the CABG.   -- Echo from 8/24/22 with normal EF of 55-60%. Clinically doesn't appear vol up. BNP is 190. No prior BNP for comparison   -- BB decreased as above,  statin, aspirin and amiodarone patient to decrease in 1 week per cards, instructed to contact clinic when to decrease    - CVS seen nothing to add  - repeat ECHO     DM-2:  -- Last A1c  6.1 as of 8/26/22  --continue glipizide and metformin at home   - lantus never reordered on admission and BG stable so hold for now  - follow-up with PCP next week and reassess need for basal insulin      Hyperlipidemia:  -- Cont statin as above     BPH:  -- Cont with home meds     Hyponatremia:  Resolved           Consultations This Hospital Stay   PHARMACY TO DOSE VANCO  CARE MANAGEMENT / SOCIAL WORK IP CONSULT  PHYSICAL THERAPY ADULT IP CONSULT  PHARMACY TO DOSE VANCO  OCCUPATIONAL THERAPY ADULT IP CONSULT  CARDIOTHORACIC SURGERY IP CONSULT  CARDIAC REHAB IP CONSULT    Code Status   Full Code    Time Spent on this Encounter   I, Diamond Arcoe MD, personally saw the patient today and spent greater than 30 minutes discharging this patient.       Diamond Arceo MD  [facility]  [address] [place], [place] [id number]  Phone: [phone]  Fax: [phone]  ______________________________________________________________________    Physical Exam   Vital Signs: Temp: 98.1  F (36.7  C) Temp src: Oral BP: 97/53 Pulse: 64   Resp: 18 SpO2: 93 % O2 Device: None (Room air)    Weight: 194 lbs 8 oz  Physical Exam  Constitutional:       Appearance: Normal appearance.   HENT:      Head: Normocephalic and atraumatic.   Cardiovascular:      Rate and Rhythm: Normal rate and regular rhythm.      Pulses: Normal pulses.      Comments: sternotomy scar healing  Pulmonary:      Effort:  Pulmonary effort is normal.      Breath sounds: Normal breath sounds.   Abdominal:      Palpations: Abdomen is soft.   Musculoskeletal:         General: Normal range of motion.   Skin:     Capillary Refill: Capillary refill takes less than 2 seconds.   Neurological:      General: No focal deficit present.      Mental Status: He is alert and oriented to person, place, and time. Mental status is at baseline.              Primary Care Physician   Misha Dawson    Discharge Orders      Follow-Up with Cardiology      Follow-Up with Cardiology DIPIKA Heart Failure Discharge      Primary Care - Care Coordination Referral      Reason for your hospital stay    Active Problems:    Pneumonia    CAD (coronary artery disease)    Fatigue    Near syncope     Activity    Your activity upon discharge: activity as tolerated, continue restrictions and activity as per CV surgery previously recommended     Monitor and record    Weigh yourself every morning     When to contact your care team    Contact your care team If symptoms get worse, If increased shortness of breath, If waking up at night with difficulty breathing, If unable to lie down for sleep due to symptoms, If weight gain of 2 pounds a day for 2 days in a row OR 5 pounds in 1 week., Increased swelling in your ankles or legs, and Dizziness or lightheadedness     Add follow up information to the AVS prior to printing     Discharge Instructions    Per cardiology amiodarone is to be decreased to once daily in 1 week. Discuss with PCP or contact Cardiology clinic what date to cahnge     Discharge Follow Up - with Primary Care clinic within 3-5 days (RN to schedule prior to d/c for HE/Entira primary care    Check BMP. Lantus stopped BG have been stable and A1c 6.1. reassess at follow-up    Resume Cardiac rehab as previously order by CV surgery     Diet    Follow this diet upon discharge: Orders Placed This Encounter      Combination Diet Regular Diet Adult; Moderate Consistent  Carb (60 g CHO per Meal) Diet       Significant Results and Procedures   Most Recent 3 CBC's:Recent Labs   Lab Test 09/16/22  0423 09/14/22  0507 09/13/22  1243 09/06/22  0451 09/05/22  0438   WBC  --  8.7 13.3*  --  10.5   HGB 10.5* 10.5* 9.7*  --  9.9*   MCV  --  94 93  --  94   PLT  --  585* 688* 256 200     Most Recent 3 BMP's:Recent Labs   Lab Test 09/16/22  0803 09/16/22  0423 09/15/22  2117 09/15/22  1025 09/15/22  0843 09/14/22  0808 09/14/22  0507   NA  --  135*  --   --  136  --  136   POTASSIUM  --  4.0  --   --  3.8  --  4.0   CHLORIDE  --  105  --   --  100  --  105   CO2  --  23  --   --  29  --  24   BUN  --  16  --   --  12  --  13   CR  --  0.76  --   --  0.93  --  0.86   ANIONGAP  --  7  --   --  7  --  7   EVELYNE  --  8.6  --   --  9.1  --  8.4*   * 99 110*   < > 147*   < > 71    < > = values in this interval not displayed.   ,   Results for orders placed or performed during the hospital encounter of 09/13/22   CT Head w/o Contrast    Narrative    EXAM: CT HEAD W/O CONTRAST  LOCATION: [facility]  DATE/TIME: 9/13/2022 2:06 PM    INDICATION: fall with head injury  COMPARISON: None.  TECHNIQUE: Routine CT Head without IV contrast. Multiplanar reformats. Dose reduction techniques were used.    FINDINGS:  INTRACRANIAL CONTENTS: No intracranial hemorrhage, extraaxial collection, or mass effect.  No CT evidence of acute infarct. Mild presumed chronic small vessel ischemic changes. Moderate to marked prominence of the bilateral lateral ventricles and third   ventricle. This appears disproportionate to the background mild diffuse parenchymal volume loss. In the appropriate clinical setting, a component of normal pressure hydrocephalus/communicating hydrocephalus should be considered.     VISUALIZED ORBITS/SINUSES/MASTOIDS: No intraorbital abnormality. No paranasal sinus mucosal disease. No middle ear or mastoid effusion.    BONES/SOFT TISSUES: No skull fracture. No scalp  hematoma.      Impression    IMPRESSION:  1.  No acute intracranial abnormality.    2.  Moderate to marked prominence of the bilateral lateral ventricles and third ventricle appears disproportionate to the background mild diffuse parenchymal volume loss. In the appropriate clinical setting, a component of normal pressure   hydrocephalus/communicating hydrocephalus should be considered.   XR Chest 2 Views    Narrative    EXAM: XR CHEST 2 VIEWS  LOCATION: [facility]  DATE/TIME: 9/13/2022 2:11 PM    INDICATION: shortness of breath  COMPARISON: 09/05/2022      Impression    IMPRESSION: The heart is normal in size. Left basilar atelectasis and or infiltrate and small left pleural effusion are identified. Median sternotomy wires are identified. No pneumothorax.   Chest CT w/o contrast    Narrative    EXAM: CT CHEST W/O CONTRAST  LOCATION: [facility]  DATE/TIME: 9/13/2022 5:13 PM    INDICATION: possible left sided pneumonia  COMPARISON: AP and lateral views of the chest 9/13/2022  TECHNIQUE: CT chest without IV contrast. Multiplanar reformats were obtained. Dose reduction techniques were used.  CONTRAST: None.    FINDINGS:   LUNGS AND PLEURA: Small to moderate left and small right pleural effusions are present. Pleural fluid layers dependently. There is passive bandlike atelectasis along the posterior pleura in the right lower lobe. Atelectasis of the left lower lobe is more   extensive and includes all basilar segments. There is also a band of atelectasis in the posterior left upper lobe along the major fissure. The central airways are patent including the segmental and proximal subsegmental airways in the left lower lobe.   The distal subsegmental airways of the left lower lobe are involved with atelectasis.    MEDIASTINUM: Status post recent median sternotomy and coronary revascularization. There is expected postsurgical fluid and a trace amount of air within the  anterior mediastinum related to surgery. Margins of the sternal osteotomy are in good apposition.    Cardiac chambers are normal in size. There is no pericardial effusion. Normal caliber thoracic aorta. Imaged thyroid gland is normal. No enlarged mediastinal or hilar lymph nodes. The esophagus is decompressed.    CORONARY ARTERY CALCIFICATION: Previous intervention (stents or CABG).    UPPER ABDOMEN: No significant finding.    MUSCULOSKELETAL: No aggressive or destructive bone lesions.      Impression    IMPRESSION:   1.  Small to moderate left and small right pleural effusions are present with related atelectasis. There are no specific findings to suggest a superimposed pneumonia.  2.  Status post recent median sternotomy and coronary revascularization. There is expected fluid and air in the anterior mediastinum.     XR Abdomen 2 Views    Narrative    EXAM: XR ABDOMEN 2 VIEWS  LOCATION: [facility]  DATE/TIME: 2022 10:18 AM    INDICATION: flat and upright and assess stool burden vs SBO with vomiting  COMPARISON: Chest CT 2022      Impression    IMPRESSION: There are small, bilateral pleural effusions left greater than right with associated left lower lobe opacities as seen on the chest CT. Poststernotomy changes.    No free air on the upright view. Bowel gas pattern is notable for a paucity of stool. No abnormally dilated loops of bowel. Numerous phleboliths in the pelvis. There is mild lumbar rotoscoliosis convex to the left centered at L3.   Echocardiogram Limited     Value    LVEF  55%    Narrative    [id number]  [id number]  [id number]  [id number]^JH^[place] [address]     Name: NGHIA DIGGS  MRN: [id number]  : 1950  Study Date: 2022 11:58 AM  Age: 71 yrs  Gender: Male  Patient Location: [facility] [address]  Reason For Study: CHF  Ordering Physician: DEZ PEÑALOZA  Referring Physician: DEZ PEÑALOZA  Performed By: DOUG     BSA:  2.2 m2  Height: 74 in  Weight: 196 lb  HR: 66  ______________________________________________________________________________  Procedure  Limited Echo Adult. Definity (NDC #09204-025) given intravenously.  ______________________________________________________________________________  Interpretation Summary     1. The visual ejection fraction is estimated at 55%. The left ventricle is  normal in size. There is borderline concentric left ventricular hypertrophy.  Flattened septum is consistent with RV pressure overload.  2. The right ventricle is mildly dilated. Mildly decreased right ventricular  systolic function  3. Small pericardial effusion. There are no echocardiographic indications of  cardiac tamponade. Normal RA pressure of 3 mmHg.  ______________________________________________________________________________  Left Ventricle  The left ventricle is normal in size. There is borderline concentric left  ventricular hypertrophy. The visual ejection fraction is estimated at 55%.  Flattened septum is consistent with RV pressure overload.     Right Ventricle  The right ventricle is mildly dilated. Mildly decreased right ventricular  systolic function.     Atria  The left atrium is not well visualized. Normal left atrial size. Right atrium  not well visualized.     Mitral Valve  The mitral valve is not well visualized. There is trace mitral regurgitation.  There is no mitral valve stenosis.     Tricuspid Valve  This degree of valvular regurgitation is within normal limits.     Aortic Valve  The aortic valve is not well visualized. There is trace to mild aortic  regurgitation. No aortic stenosis is present.     Pulmonic Valve  The pulmonic valve is not well visualized. There is trace pulmonic valvular  regurgitation.     Vessels  IVC diameter <2.1 cm collapsing >50% with sniff suggests a normal RA pressure  of 3 mmHg.     Pericardium  Small pericardial effusion. There are no echocardiographic indications of  cardiac  tamponade.     ______________________________________________________________________________  Time Measurements     MM HR: 64.0 BPM     Doppler Measurements & Calculations  PI end-d yadiel: 82.8 cm/sec  TR max yadiel: 198.0 cm/sec  TR max PG: 15.7 mmHg     ______________________________________________________________________________  Report approved by: Sunil Avila 09/14/2022 01:54 PM           Echo  The visual ejection fraction is estimated at 55%. The left ventricle is  normal in size. There is borderline concentric left ventricular hypertrophy.  Flattened septum is consistent with RV pressure overload.  2. The right ventricle is mildly dilated. Mildly decreased right ventricular  systolic function  3. Small pericardial effusion. There are no echocardiographic indications of  cardiac tamponade. Normal RA pressure of 3 mm    Discharge Medications   Current Discharge Medication List      CONTINUE these medications which have CHANGED    Details   metoprolol tartrate (LOPRESSOR) 25 MG tablet Take 1 tablet (25 mg) by mouth 2 times daily for 30 days  Qty: 60 tablet, Refills: 0    Associated Diagnoses: Chronic atrial fibrillation (H)         CONTINUE these medications which have NOT CHANGED    Details   acetaminophen (TYLENOL) 325 MG tablet Take 2 tablets (650 mg) by mouth every 4 hours as needed for other, fever or headaches (For optimal non-opioid multimodal pain management to improve pain control.)  Qty: 30 tablet, Refills: 0    Associated Diagnoses: S/P CABG (coronary artery bypass graft)      amiodarone (PACERONE) 200 MG tablet Take 1 tablet (200 mg) by mouth 2 times daily for 30 days  Qty: 60 tablet, Refills: 0    Associated Diagnoses: Postoperative atrial fibrillation (H)      aspirin (ASA) 81 MG chewable tablet 2 tablets (162 mg) by Oral or NG Tube route daily  Qty: 60 tablet, Refills: 3    Associated Diagnoses: S/P CABG (coronary artery bypass graft)      atorvastatin (LIPITOR) 80 MG tablet Take 1  tablet (80 mg) by mouth daily  Qty: 90 tablet, Refills: 3    Associated Diagnoses: WATTERS (dyspnea on exertion); Abnormal cardiovascular stress test      finasteride (PROSCAR) 5 MG tablet Take 5 mg by mouth daily      FLUoxetine (PROZAC) 20 MG capsule Take 60 mg by mouth daily      glipiZIDE (GLUCOTROL XL) 5 MG 24 hr tablet Take 5 mg by mouth 2 times daily (before meals)      lactobacillus rhamnosus (GG) (CULTURELL) capsule Take 1 capsule by mouth daily      metFORMIN (GLUCOPHAGE) 1000 MG tablet [METFORMIN (GLUCOPHAGE) 1000 MG TABLET] Take 1,000 mg by mouth 2 (two) times a day with meals.      nitroGLYcerin (NITROSTAT) 0.4 MG sublingual tablet For chest pain place 1 tablet under the tongue every 5 minutes for 3 doses. If symptoms persist 5 minutes after 1st dose call 911.  Qty: 25 tablet, Refills: 1    Associated Diagnoses: WATTERS (dyspnea on exertion); Abnormal cardiovascular stress test      tamsulosin (FLOMAX) 0.4 mg Cp24 Take 0.4 mg by mouth every evening      vitamin D3 (CHOLECALCIFEROL) 50 mcg (2000 units) tablet Take 2 tablets by mouth daily      !! BD PEN NEEDLE MICRO U/F 32G X 6 MM miscellaneous 1 4 TIMES DAILY AS DIRECTED AS NEEDED E11.42      !! insulin pen needle (32G X 4 MM) 32G X 4 MM miscellaneous Use pen needles as directed.  Qty: 100 each, Refills: 0    Associated Diagnoses: S/P CABG (coronary artery bypass graft)      Lancets (ONETOUCH DELICA PLUS [id number]) MISC See Admin Instructions       !! - Potential duplicate medications found. Please discuss with provider.      STOP taking these medications       furosemide (LASIX) 20 MG tablet Comments:   Reason for Stopping:         Insulin Glargine-Lixisenatide (SOLIQUA SC) Comments:   Reason for Stopping:         oxyCODONE (ROXICODONE) 5 MG tablet Comments:   Reason for Stopping:         potassium chloride ER (KLOR-CON M) 10 MEQ CR tablet Comments:   Reason for Stopping:             Allergies   No Known Allergies

## 2022-09-16 NOTE — PLAN OF CARE
"  Problem: Plan of Care - These are the overarching goals to be used throughout the patient stay.    Goal: Plan of Care Review/Shift Note  Description: The Plan of Care Review/Shift note should be completed every shift.  The Outcome Evaluation is a brief statement about your assessment that the patient is improving, declining, or no change.  This information will be displayed automatically on your shift note.  Outcome: Adequate for Care Transition  Goal: Patient-Specific Goal (Individualized)  Description: You can add care plan individualizations to a care plan. Examples of Individualization might be:  \"Parent requests to be called daily at 9am for status\", \"I have a hard time hearing out of my right ear\", or \"Do not touch me to wake me up as it startles me\".  Outcome: Adequate for Care Transition  Goal: Absence of Hospital-Acquired Illness or Injury  Outcome: Adequate for Care Transition  Intervention: Identify and Manage Fall Risk  Recent Flowsheet Documentation  Taken 9/16/2022 0739 by Savita Rubio RN  Safety Promotion/Fall Prevention:   activity supervised   mobility aid in reach   nonskid shoes/slippers when out of bed   room door open  Intervention: Prevent Skin Injury  Recent Flowsheet Documentation  Taken 9/16/2022 0739 by Savita Rubio RN  Body Position: position changed independently  Intervention: Prevent and Manage VTE (Venous Thromboembolism) Risk  Recent Flowsheet Documentation  Taken 9/16/2022 0739 by Savita Rubio, RN  Activity Management: activity adjusted per tolerance  Goal: Optimal Comfort and Wellbeing  Outcome: Adequate for Care Transition  Goal: Readiness for Transition of Care  Outcome: Adequate for Care Transition     Problem: Risk for Delirium  Goal: Optimal Coping  Outcome: Adequate for Care Transition  Goal: Improved Behavioral Control  Outcome: Adequate for Care Transition  Goal: Improved Attention and Thought Clarity  Outcome: Adequate for Care Transition  Goal: Improved " Sleep  Outcome: Adequate for Care Transition     Problem: Fall Injury Risk  Goal: Absence of Fall and Fall-Related Injury  Outcome: Adequate for Care Transition  Intervention: Identify and Manage Contributors  Recent Flowsheet Documentation  Taken 9/16/2022 0739 by Savita Rubio, RN  Medication Review/Management: medications reviewed  Intervention: Promote Injury-Free Environment  Recent Flowsheet Documentation  Taken 9/16/2022 0739 by Savita Rubio, RN  Safety Promotion/Fall Prevention:   activity supervised   mobility aid in reach   nonskid shoes/slippers when out of bed   room door open   Goal Outcome Evaluation:  Discharge to home wife her to drive home Shower given before discharge  IV and Telemetry  removed  All incision sites stable  no redness or any drainage noted   discharge instructions discussed  medication changes  OOP MD follow appointment schedules discussed and verbalized understanding .

## 2022-09-16 NOTE — PROGRESS NOTES
Care Management Discharge Note    Discharge Date: 09/16/2022       Discharge Disposition: Other (Comments), Home (OP Cardiac Rehab)    Discharge Services: None (OP Cardiac Rehab)    Discharge DME: None    Discharge Transportation: family or friend will provide    Private pay costs discussed: Not applicable      Education Provided on the Discharge Plan:  Yes  Persons Notified of Discharge Plans: MD, RN, SW, Patient  Patient/Family in Agreement with the Plan: yes    Handoff Referral Completed: Yes    Additional Information:  Patient was going to discharge yesterday but due to onset of dizziness, it was cancelled.     Patient is  and largely independent with activities of daily living at baseline. Patient will discharge home with outpatient cardiac rehab. Family will transport.         Carie Gutierres

## 2022-09-16 NOTE — PROGRESS NOTES
Pt does not want to be weighed right now.  Pt wants to wait until he gets up for breakfast to be weighed.  Will pass on to day shift to obtain weight.

## 2022-09-19 ENCOUNTER — TELEPHONE (OUTPATIENT)
Dept: CARDIOLOGY | Facility: CLINIC | Age: 72
End: 2022-09-19

## 2022-09-19 ENCOUNTER — PATIENT OUTREACH (OUTPATIENT)
Dept: CARE COORDINATION | Facility: CLINIC | Age: 72
End: 2022-09-19

## 2022-09-19 NOTE — PROGRESS NOTES
Connected Care Resource Center Contact  [facility]/Voicemail     Clinical Data: Transitional Care Management Outreach     Outreach attempted x 2.  Left message on patient's voicemail, providing [facility]'s 24/7 scheduling and nurse triage phone number [phone] ([phone]) for questions/concerns and/or to schedule an appt with an [facility] provider, if they do not have a PCP.      Plan:  [facility] will do no further outreaches at this time.       Fifi Cerna MA  Connected Care Resource Center, [facility]    *Connected Care Resource Team does NOT follow patient ongoing. Referrals are identified based on internal discharge reports and the outreach is to ensure patient has an understanding of their discharge instructions.

## 2022-09-19 NOTE — TELEPHONE ENCOUNTER
Patients wife called concerned about patients continued dizziness. Patient was hospitalized last week and discharged Friday due to dizziness and orthostatic hypotension. He continues to have periods of dizziness and is afraid he'll have another fall. Reached out to Abby MANE and explained patients situation and patient was encouraged to reach out to PCP and schedule an appointment with them.

## 2022-09-20 ENCOUNTER — LAB REQUISITION (OUTPATIENT)
Dept: LAB | Facility: CLINIC | Age: 72
End: 2022-09-20

## 2022-09-20 ENCOUNTER — TELEPHONE (OUTPATIENT)
Dept: CARDIOLOGY | Facility: CLINIC | Age: 72
End: 2022-09-20

## 2022-09-20 ENCOUNTER — TRANSFERRED RECORDS (OUTPATIENT)
Dept: HEALTH INFORMATION MANAGEMENT | Facility: CLINIC | Age: 72
End: 2022-09-20

## 2022-09-20 LAB
ATRIAL RATE - MUSE: 77 BPM
DIASTOLIC BLOOD PRESSURE - MUSE: 75 MMHG
INTERPRETATION ECG - MUSE: NORMAL
P AXIS - MUSE: 69 DEGREES
PR INTERVAL - MUSE: 176 MS
QRS DURATION - MUSE: 100 MS
QT - MUSE: 438 MS
QTC - MUSE: 495 MS
R AXIS - MUSE: -13 DEGREES
SYSTOLIC BLOOD PRESSURE - MUSE: 116 MMHG
T AXIS - MUSE: 27 DEGREES
VENTRICULAR RATE- MUSE: 77 BPM

## 2022-09-20 PROCEDURE — 84443 ASSAY THYROID STIM HORMONE: CPT | Performed by: PHYSICIAN ASSISTANT

## 2022-09-20 PROCEDURE — 80053 COMPREHEN METABOLIC PANEL: CPT | Performed by: PHYSICIAN ASSISTANT

## 2022-09-20 PROCEDURE — 82248 BILIRUBIN DIRECT: CPT | Performed by: PHYSICIAN ASSISTANT

## 2022-09-20 NOTE — TELEPHONE ENCOUNTER
Patient had left message about persistent dizziness with ambulation.  Called and spoke to patient's wife who stated patient continued to have dizziness and has very poor activity tolerance.  She further stated that patient has had repeated nausea and vomiting 25 hours after meals.  Today patient is eating 3 meals daily.  Patient does have regular bowel movements.  Patient was discharged to hospital post CABG X 2 on 09/07/2022, readmitted on 09/13/2022 with pneumonia.  During the readmission hospitalization, his metoprolol titrate was reduced from metoprolol titrate 37.5 mg twice a day to metoprolol titrate 25 mg twice a day.  Patient's wife stated that patient's BP is usually around systolic 106-110 with the diastolic above 50.  Advised that may need to further reduce metoprolol titrate dose down to 12.5 mg twice a day.  Patient is going in to see his PCP today and patient's wife was advised to discuss PCP to reduce her metoprolol dose to metoprolol tartrate 12.5 mg twice a day, starting today.    Sridhar Bro PA-C  Cardiovascular Surgery  [phone]

## 2022-09-21 LAB
ALBUMIN SERPL BCG-MCNC: 3.7 G/DL (ref 3.5–5.2)
ALP SERPL-CCNC: 126 U/L (ref 40–129)
ALT SERPL W P-5'-P-CCNC: 29 U/L (ref 10–50)
ANION GAP SERPL CALCULATED.3IONS-SCNC: 13 MMOL/L (ref 7–15)
AST SERPL W P-5'-P-CCNC: 21 U/L (ref 10–50)
BILIRUB DIRECT SERPL-MCNC: <0.2 MG/DL (ref 0–0.3)
BILIRUB SERPL-MCNC: 0.5 MG/DL
BUN SERPL-MCNC: 23.4 MG/DL (ref 8–23)
CALCIUM SERPL-MCNC: 9.1 MG/DL (ref 8.8–10.2)
CHLORIDE SERPL-SCNC: 95 MMOL/L (ref 98–107)
CREAT SERPL-MCNC: 1 MG/DL (ref 0.67–1.17)
DEPRECATED HCO3 PLAS-SCNC: 23 MMOL/L (ref 22–29)
GFR SERPL CREATININE-BSD FRML MDRD: 80 ML/MIN/1.73M2
GLUCOSE SERPL-MCNC: 100 MG/DL (ref 70–99)
POTASSIUM SERPL-SCNC: 4.4 MMOL/L (ref 3.4–5.3)
PROT SERPL-MCNC: 6.1 G/DL (ref 6.4–8.3)
SODIUM SERPL-SCNC: 131 MMOL/L (ref 136–145)
TSH SERPL DL<=0.005 MIU/L-ACNC: 6.39 UIU/ML (ref 0.3–4.2)

## 2022-09-22 ENCOUNTER — HOSPITAL ENCOUNTER (OUTPATIENT)
Dept: CARDIAC REHAB | Facility: HOSPITAL | Age: 72
Discharge: HOME | End: 2022-09-22
Attending: THORACIC SURGERY (CARDIOTHORACIC VASCULAR SURGERY)
Payer: COMMERCIAL

## 2022-09-22 DIAGNOSIS — Z95.1 S/P CABG (CORONARY ARTERY BYPASS GRAFT): ICD-10-CM

## 2022-09-22 PROCEDURE — 93797 PHYS/QHP OP CAR RHAB WO ECG: CPT

## 2022-09-22 PROCEDURE — 93798 PHYS/QHP OP CAR RHAB W/ECG: CPT

## 2022-09-23 ENCOUNTER — TRANSFERRED RECORDS (OUTPATIENT)
Dept: HEALTH INFORMATION MANAGEMENT | Facility: CLINIC | Age: 72
End: 2022-09-23

## 2022-09-27 ENCOUNTER — HOSPITAL ENCOUNTER (OUTPATIENT)
Dept: CARDIAC REHAB | Facility: HOSPITAL | Age: 72
Discharge: HOME | End: 2022-09-27
Attending: INTERNAL MEDICINE
Payer: COMMERCIAL

## 2022-09-27 PROCEDURE — 93798 PHYS/QHP OP CAR RHAB W/ECG: CPT

## 2022-09-29 ENCOUNTER — HOSPITAL ENCOUNTER (OUTPATIENT)
Dept: CARDIAC REHAB | Facility: HOSPITAL | Age: 72
Discharge: HOME | End: 2022-09-29
Attending: INTERNAL MEDICINE
Payer: COMMERCIAL

## 2022-09-29 PROCEDURE — 93798 PHYS/QHP OP CAR RHAB W/ECG: CPT

## 2022-10-01 ENCOUNTER — HEALTH MAINTENANCE LETTER (OUTPATIENT)
Age: 72
End: 2022-10-01

## 2022-10-04 ENCOUNTER — HOSPITAL ENCOUNTER (OUTPATIENT)
Dept: CARDIAC REHAB | Facility: HOSPITAL | Age: 72
Discharge: HOME | End: 2022-10-04
Attending: INTERNAL MEDICINE
Payer: COMMERCIAL

## 2022-10-04 PROCEDURE — 93798 PHYS/QHP OP CAR RHAB W/ECG: CPT

## 2022-10-06 ENCOUNTER — TRANSFERRED RECORDS (OUTPATIENT)
Dept: HEALTH INFORMATION MANAGEMENT | Facility: CLINIC | Age: 72
End: 2022-10-06

## 2022-10-06 ENCOUNTER — HOSPITAL ENCOUNTER (OUTPATIENT)
Dept: CARDIAC REHAB | Facility: HOSPITAL | Age: 72
Discharge: HOME | End: 2022-10-06
Attending: INTERNAL MEDICINE
Payer: COMMERCIAL

## 2022-10-06 PROCEDURE — 93798 PHYS/QHP OP CAR RHAB W/ECG: CPT

## 2022-10-10 ENCOUNTER — OFFICE VISIT (OUTPATIENT)
Dept: CARDIOLOGY | Facility: CLINIC | Age: 72
End: 2022-10-10
Payer: COMMERCIAL

## 2022-10-10 VITALS
HEIGHT: 74 IN | HEART RATE: 67 BPM | WEIGHT: 193 LBS | DIASTOLIC BLOOD PRESSURE: 56 MMHG | SYSTOLIC BLOOD PRESSURE: 92 MMHG | BODY MASS INDEX: 24.77 KG/M2 | RESPIRATION RATE: 16 BRPM

## 2022-10-10 DIAGNOSIS — I48.20 CHRONIC ATRIAL FIBRILLATION (H): ICD-10-CM

## 2022-10-10 DIAGNOSIS — Z95.1 S/P CABG (CORONARY ARTERY BYPASS GRAFT): ICD-10-CM

## 2022-10-10 PROCEDURE — 99024 POSTOP FOLLOW-UP VISIT: CPT | Performed by: PHYSICIAN ASSISTANT

## 2022-10-10 RX ORDER — METOPROLOL TARTRATE 25 MG/1
12.5 TABLET, FILM COATED ORAL DAILY
Qty: 60 TABLET | Refills: 0 | Status: SHIPPED | OUTPATIENT
Start: 2022-10-10 | End: 2022-10-26 | Stop reason: ALTCHOICE

## 2022-10-10 RX ORDER — LEVOTHYROXINE SODIUM 25 UG/1
25 TABLET ORAL DAILY
COMMUNITY
Start: 2022-09-23

## 2022-10-10 NOTE — PROGRESS NOTES
CARDIOTHORACIC SURGERY FOLLOW-UP VISIT     Bala Hopper   1950   [id number]      Reason for visit: Post operative clinic visit. Patient underwent coronary artery bypass grafting x 2 with Dr. Bradshaw on 9/2/22.    HPI: Bala Hopper is a 72 year old year old male seen in clinic for a routine follow-up appointment after surgery. Patient has past medical history as below. Hospital course was uneventful. Patient was discharged to home.    Patient has been doing well since discharge. Patient did follow-up with primary care since leaving the hospital. Reports that incisions are healing well. Denies fevers, peripheral edema. Appetite is improving and patient is voiding without difficulty. Weight has been stable. Pain management at this point with tylenol. Patient has been attending cardiac rehab and this is going well. Patient is not on anti-coagulation.     Pt states he was readmitted 9/13-9/16 for nausea and dizziness, possible pneumonia. Discharged to home. He had a follow with his PCP with continued dizziness despite decreasing his BB.  Brain MRI last week and follow up with neurologist soon.    PAST MEDICAL HISTORY:  Past Medical History:   Diagnosis Date     BPH (benign prostatic hyperplasia)      Cerebral infarction (H)      Depression      Diabetes (H)      Diabetes mellitus (H)      Hyperlipidemia LDL goal <100      DANIELLE (obstructive sleep apnea)      Tardive dyskinesia 2019       PAST SURGICAL HISTORY:  Past Surgical History:   Procedure Laterality Date     BYPASS GRAFT ARTERY CORONARY N/A 9/2/2022    Procedure: CORONARY ARTERY BYPASS GRAFT (CABG)X2 LEFT LEG ENDOSCOPIC SAPHENOUS VESSEL PROCUREMENT, LEFT INTERNAL MAMMARY ARTERY HARVEST, ANESTHESIA TRANSESOPHAGEAL ECHOCARDIOGRAM;EPI AORTIC ULTRASOUND;  Surgeon: Carline Bradshaw MD;  Location: [facility] Main OR     CV CORONARY ANGIOGRAM N/A 8/10/2022    Procedure: Coronary Angiogram;  Surgeon: Annette Leavitt MD;  Location: [facility] CATH LAB CV     CV  LEFT HEART CATH N/A 8/10/2022    Procedure: Left Heart Catheterization;  Surgeon: Annette Leavitt MD;  Location: [facility] LAB CV     tendon right wrist Right        CURRENT MEDICATIONS:     Current Outpatient Medications:      acetaminophen (TYLENOL) 325 MG tablet, Take 2 tablets (650 mg) by mouth every 4 hours as needed for other, fever or headaches (For optimal non-opioid multimodal pain management to improve pain control.), Disp: 30 tablet, Rfl: 0     aspirin (ASA) 81 MG chewable tablet, 2 tablets (162 mg) by Oral or NG Tube route daily, Disp: 60 tablet, Rfl: 3     atorvastatin (LIPITOR) 80 MG tablet, Take 1 tablet (80 mg) by mouth daily, Disp: 90 tablet, Rfl: 3     finasteride (PROSCAR) 5 MG tablet, Take 5 mg by mouth daily, Disp: , Rfl:      FLUoxetine (PROZAC) 20 MG capsule, Take 60 mg by mouth daily, Disp: , Rfl:      glipiZIDE (GLUCOTROL XL) 5 MG 24 hr tablet, Take 5 mg by mouth 2 times daily (before meals), Disp: , Rfl:      lactobacillus rhamnosus (GG) (CULTURELL) capsule, Take 1 capsule by mouth daily, Disp: , Rfl:      Lancets (ONETOUCH DELICA PLUS [id number]) MISC, See Admin Instructions, Disp: , Rfl:      levothyroxine (SYNTHROID/LEVOTHROID) 25 MCG tablet, , Disp: , Rfl:      metFORMIN (GLUCOPHAGE) 1000 MG tablet, [METFORMIN (GLUCOPHAGE) 1000 MG TABLET] Take 1,000 mg by mouth 2 (two) times a day with meals., Disp: , Rfl:      metoprolol tartrate (LOPRESSOR) 25 MG tablet, Take 1 tablet (25 mg) by mouth 2 times daily for 30 days, Disp: 60 tablet, Rfl: 0     nitroGLYcerin (NITROSTAT) 0.4 MG sublingual tablet, For chest pain place 1 tablet under the tongue every 5 minutes for 3 doses. If symptoms persist 5 minutes after 1st dose call 911., Disp: 25 tablet, Rfl: 1     tamsulosin (FLOMAX) 0.4 mg Cp24, Take 0.4 mg by mouth every evening, Disp: , Rfl:      vitamin D3 (CHOLECALCIFEROL) 50 mcg (2000 units) tablet, Take 2 tablets by mouth daily, Disp: , Rfl:      amiodarone (PACERONE) 200 MG tablet, Take 1  tablet (200 mg) by mouth 2 times daily for 30 days, Disp: 60 tablet, Rfl: 0     BD PEN NEEDLE MICRO U/F 32G X 6 MM miscellaneous, 1 4 TIMES DAILY AS DIRECTED AS NEEDED E11.42 (Patient not taking: Reported on 10/10/2022), Disp: , Rfl:      insulin pen needle (32G X 4 MM) 32G X 4 MM miscellaneous, Use pen needles as directed. (Patient not taking: Reported on 10/10/2022), Disp: 100 each, Rfl: 0    ALLERGIES:    No Known Allergies    ROS:  Gen: No fevers, weight loss/gain  CV: Denies chest pain, peripheral edema  Pulm: Denies SOB  GI/: Voiding without problems, appetite improving. Still some nausea    LABS:  None    IMAGING:  None    PHYSICAL EXAM:   General: Alert and oriented, pleasant, no acute distress.  CV:  No peripheral edema.  Pulm: Easy work of breathing on room air.   GI: Soft, non-tender, and non-distended  Incision: Chest and left leg incisions clean dry and intact without erythema, swelling or drainage  Neuro: CNs grossly intact.      ASSESSMENT/PLAN:  Bala Hopper is a 72 year old year old male status post CAB x 2 who returns to clinic for postop visit.     - Surgically doing well.  Incisions are healing well with no signs of infection. Sternum is stable.  - Hemodynamics are stable. Medication changes today. Decrease Metoprolol to 12.5 mg PO daily.  - Follow up with your cardiologist, Dr Grande 10/26 at 12:50 pm  - Continue Cardiac Rehab until completed.   - May start driving  (4 weeks post-op) if not using narcotic pain medications and no dizziness  - Continue strict sternal precautions until 12/2. No lifting >10bs; may gradually increase at this point (6 weeks post-op).   - No need for further follow-up with CV surgery unless concerns. Feel free to call our office with questions. [phone]         Abby Lewis PA-C  Cardiothoracic Surgery  [phone]

## 2022-10-10 NOTE — LETTER
10/10/2022    Misha Dawson MD  [address]  [place] [facility] [address]    RE: Bala Hopper       Dear Colleague,     I had the pleasure of seeing Bala Hopper in the [facility].  CARDIOTHORACIC SURGERY FOLLOW-UP VISIT     Bala Hopper   1950   [id number]      Reason for visit: Post operative clinic visit. Patient underwent coronary artery bypass grafting x 2 with Dr. Bradshaw on 9/2/22.    HPI: Bala Hopper is a 72 year old year old male seen in clinic for a routine follow-up appointment after surgery. Patient has past medical history as below. Hospital course was uneventful. Patient was discharged to home.    Patient has been doing well since discharge. Patient did follow-up with primary care since leaving the hospital. Reports that incisions are healing well. Denies fevers, peripheral edema. Appetite is improving and patient is voiding without difficulty. Weight has been stable. Pain management at this point with tylenol. Patient has been attending cardiac rehab and this is going well. Patient is not on anti-coagulation.     Pt states he was readmitted 9/13-9/16 for nausea and dizziness, possible pneumonia. Discharged to home. He had a follow with his PCP with continued dizziness despite decreasing his BB.  Brain MRI last week and follow up with neurologist soon.    PAST MEDICAL HISTORY:  Past Medical History:   Diagnosis Date     BPH (benign prostatic hyperplasia)      Cerebral infarction (H)      Depression      Diabetes (H)      Diabetes mellitus (H)      Hyperlipidemia LDL goal <100      DANIELLE (obstructive sleep apnea)      Tardive dyskinesia 2019       PAST SURGICAL HISTORY:  Past Surgical History:   Procedure Laterality Date     BYPASS GRAFT ARTERY CORONARY N/A 9/2/2022    Procedure: CORONARY ARTERY BYPASS GRAFT (CABG)X2 LEFT LEG ENDOSCOPIC SAPHENOUS VESSEL PROCUREMENT, LEFT INTERNAL MAMMARY ARTERY HARVEST, ANESTHESIA TRANSESOPHAGEAL ECHOCARDIOGRAM;EPI  AORTIC ULTRASOUND;  Surgeon: Carline Bradshaw MD;  Location: [facility] Main OR     CV CORONARY ANGIOGRAM N/A 8/10/2022    Procedure: Coronary Angiogram;  Surgeon: Annette Leavitt MD;  Location: [facility] CATH LAB CV     CV LEFT HEART CATH N/A 8/10/2022    Procedure: Left Heart Catheterization;  Surgeon: Annette Leavitt MD;  Location: [facility] CATH LAB CV     tendon right wrist Right        CURRENT MEDICATIONS:     Current Outpatient Medications:      acetaminophen (TYLENOL) 325 MG tablet, Take 2 tablets (650 mg) by mouth every 4 hours as needed for other, fever or headaches (For optimal non-opioid multimodal pain management to improve pain control.), Disp: 30 tablet, Rfl: 0     aspirin (ASA) 81 MG chewable tablet, 2 tablets (162 mg) by Oral or NG Tube route daily, Disp: 60 tablet, Rfl: 3     atorvastatin (LIPITOR) 80 MG tablet, Take 1 tablet (80 mg) by mouth daily, Disp: 90 tablet, Rfl: 3     finasteride (PROSCAR) 5 MG tablet, Take 5 mg by mouth daily, Disp: , Rfl:      FLUoxetine (PROZAC) 20 MG capsule, Take 60 mg by mouth daily, Disp: , Rfl:      glipiZIDE (GLUCOTROL XL) 5 MG 24 hr tablet, Take 5 mg by mouth 2 times daily (before meals), Disp: , Rfl:      lactobacillus rhamnosus (GG) (CULTURELL) capsule, Take 1 capsule by mouth daily, Disp: , Rfl:      Lancets (ONETOUCH DELICA PLUS [id number]) MISC, See Admin Instructions, Disp: , Rfl:      levothyroxine (SYNTHROID/LEVOTHROID) 25 MCG tablet, , Disp: , Rfl:      metFORMIN (GLUCOPHAGE) 1000 MG tablet, [METFORMIN (GLUCOPHAGE) 1000 MG TABLET] Take 1,000 mg by mouth 2 (two) times a day with meals., Disp: , Rfl:      metoprolol tartrate (LOPRESSOR) 25 MG tablet, Take 1 tablet (25 mg) by mouth 2 times daily for 30 days, Disp: 60 tablet, Rfl: 0     nitroGLYcerin (NITROSTAT) 0.4 MG sublingual tablet, For chest pain place 1 tablet under the tongue every 5 minutes for 3 doses. If symptoms persist 5 minutes after 1st dose call 911., Disp: 25 tablet, Rfl: 1     tamsulosin  (FLOMAX) 0.4 mg Cp24, Take 0.4 mg by mouth every evening, Disp: , Rfl:      vitamin D3 (CHOLECALCIFEROL) 50 mcg (2000 units) tablet, Take 2 tablets by mouth daily, Disp: , Rfl:      amiodarone (PACERONE) 200 MG tablet, Take 1 tablet (200 mg) by mouth 2 times daily for 30 days, Disp: 60 tablet, Rfl: 0     BD PEN NEEDLE MICRO U/F 32G X 6 MM miscellaneous, 1 4 TIMES DAILY AS DIRECTED AS NEEDED E11.42 (Patient not taking: Reported on 10/10/2022), Disp: , Rfl:      insulin pen needle (32G X 4 MM) 32G X 4 MM miscellaneous, Use pen needles as directed. (Patient not taking: Reported on 10/10/2022), Disp: 100 each, Rfl: 0    ALLERGIES:    No Known Allergies    ROS:  Gen: No fevers, weight loss/gain  CV: Denies chest pain, peripheral edema  Pulm: Denies SOB  GI/: Voiding without problems, appetite improving. Still some nausea    LABS:  None    IMAGING:  None    PHYSICAL EXAM:   General: Alert and oriented, pleasant, no acute distress.  CV:  No peripheral edema.  Pulm: Easy work of breathing on room air.   GI: Soft, non-tender, and non-distended  Incision: Chest and left leg incisions clean dry and intact without erythema, swelling or drainage  Neuro: CNs grossly intact.      ASSESSMENT/PLAN:  Bala Hopper is a 72 year old year old male status post CAB x 2 who returns to clinic for postop visit.     - Surgically doing well.  Incisions are healing well with no signs of infection. Sternum is stable.  - Hemodynamics are stable. Medication changes today. Decrease Metoprolol to 12.5 mg PO daily.  - Follow up with your cardiologist, Dr Grande 10/26 at 12:50 pm  - Continue Cardiac Rehab until completed.   - May start driving  (4 weeks post-op) if not using narcotic pain medications and no dizziness  - Continue strict sternal precautions until 12/2. No lifting >10bs; may gradually increase at this point (6 weeks post-op).   - No need for further follow-up with CV surgery unless concerns. Feel free to call our office with  questions. [phone]         Abby Lewis PA-C  Cardiothoracic Surgery  [phone]      Thank you for allowing me to participate in the care of your patient.      Sincerely,     Abby Lewis PA-C   [facility]  cc: No referring provider defined for this encounter.

## 2022-10-11 ENCOUNTER — HOSPITAL ENCOUNTER (OUTPATIENT)
Dept: CARDIAC REHAB | Facility: HOSPITAL | Age: 72
Discharge: HOME | End: 2022-10-11
Attending: INTERNAL MEDICINE
Payer: COMMERCIAL

## 2022-10-11 PROCEDURE — 93798 PHYS/QHP OP CAR RHAB W/ECG: CPT

## 2022-10-13 ENCOUNTER — HOSPITAL ENCOUNTER (OUTPATIENT)
Dept: CARDIAC REHAB | Facility: HOSPITAL | Age: 72
Discharge: HOME | End: 2022-10-13
Attending: INTERNAL MEDICINE
Payer: COMMERCIAL

## 2022-10-13 PROCEDURE — 93798 PHYS/QHP OP CAR RHAB W/ECG: CPT

## 2022-10-18 ENCOUNTER — HOSPITAL ENCOUNTER (OUTPATIENT)
Dept: CARDIAC REHAB | Facility: HOSPITAL | Age: 72
Discharge: HOME | End: 2022-10-18
Attending: INTERNAL MEDICINE
Payer: COMMERCIAL

## 2022-10-18 PROCEDURE — 93798 PHYS/QHP OP CAR RHAB W/ECG: CPT

## 2022-10-20 ENCOUNTER — HOSPITAL ENCOUNTER (OUTPATIENT)
Dept: CARDIAC REHAB | Facility: HOSPITAL | Age: 72
Discharge: HOME | End: 2022-10-20
Attending: INTERNAL MEDICINE
Payer: COMMERCIAL

## 2022-10-20 PROCEDURE — 93798 PHYS/QHP OP CAR RHAB W/ECG: CPT

## 2022-10-25 ENCOUNTER — HOSPITAL ENCOUNTER (OUTPATIENT)
Dept: CARDIAC REHAB | Facility: HOSPITAL | Age: 72
Discharge: HOME | End: 2022-10-25
Attending: INTERNAL MEDICINE
Payer: COMMERCIAL

## 2022-10-25 LAB — GLUCOSE BLDC GLUCOMTR-MCNC: 149 MG/DL (ref 70–99)

## 2022-10-25 PROCEDURE — 93798 PHYS/QHP OP CAR RHAB W/ECG: CPT

## 2022-10-26 ENCOUNTER — OFFICE VISIT (OUTPATIENT)
Dept: CARDIOLOGY | Facility: CLINIC | Age: 72
End: 2022-10-26
Payer: COMMERCIAL

## 2022-10-26 VITALS
WEIGHT: 187 LBS | SYSTOLIC BLOOD PRESSURE: 116 MMHG | HEIGHT: 74 IN | RESPIRATION RATE: 16 BRPM | DIASTOLIC BLOOD PRESSURE: 66 MMHG | BODY MASS INDEX: 24 KG/M2 | HEART RATE: 71 BPM

## 2022-10-26 DIAGNOSIS — I10 HYPERTENSION, UNSPECIFIED TYPE: ICD-10-CM

## 2022-10-26 DIAGNOSIS — I25.10 CORONARY ARTERY DISEASE INVOLVING NATIVE CORONARY ARTERY WITHOUT ANGINA PECTORIS, UNSPECIFIED WHETHER NATIVE OR TRANSPLANTED HEART: Primary | ICD-10-CM

## 2022-10-26 DIAGNOSIS — E78.5 DYSLIPIDEMIA: ICD-10-CM

## 2022-10-26 PROCEDURE — 99214 OFFICE O/P EST MOD 30 MIN: CPT | Mod: 24 | Performed by: INTERNAL MEDICINE

## 2022-10-26 NOTE — LETTER
10/26/2022    Misha Dawson MD  [address]  [place] [address]    RE: Bala Hopper       Dear Colleague,     I had the pleasure of seeing Bala Hopper in the [facility].         [facility] [address], [place] [address]   [email or web address]   OFFICE: [phone]          Thank you Misha Abraham for asking the [facility] team to participate in the care of your patient, Bala Hopper.     Impression and Plan       1.  Coronary artery disease.  Bala has known coronary artery disease.  Specifically, he underwent two-vessel coronary artery bypass graft surgery 2 September 2022 with ARRON graft to the LAD and saphenous vein graft to the diagonal branch.    He denies any anginal chest pain.  As noted below, he has had some tendency toward lower blood pressures.  Plan:    Discontinue low-dose metoprolol.    2.   Dyslipidemia.  Lipid profile 10 August 2022 revealed LDL 69 mg/dL and HDL 31 mg/dL.  Continue statin therapy.    4.  Obstructive sleep apnea.  Bala does have a history of obstructive sleep apnea and uses CPAP.    Plan on follow-up in 6 months.    35 minutes spent reviewing prior records (including documentation, laboratory studies, cardiac testing/imaging), interview with patient along with physical exam, planning, and subsequent documentation/crafting of note).           History of Present Illness    Once again I would like to thank you again for asking me to participate in the care of your patient, Bala Hopper.  As you know, but to reiterate for my own records, Bala Hpoper is a 72 year old male with known coronary artery disease.  Specifically, Bala underwent two-vessel coronary artery bypass graft surgery 2 September 2022 with ARRON graft to the LAD and saphenous vein graft to the diagonal branch.    On further interview, Bala denies any anginal type chest pain.  He  is breathing has been comfortable.  He has had some tendency toward lower blood pressure with some associated lightheadedness, however.  He has been participating in cardiac rehab without incident.  Parenthetically, he tells me that he has been diagnosed with some degree of hydrocephalus and is is to see a neurologist.    Further review of systems is otherwise negative/noncontributory (medical record and 13 point review of systems reviewed as well and pertinent positives noted).         Cardiac Diagnostics      Echocardiogram 14 September 2022:  1. Limited echocardiographic study.  2. Normal left ventricular size and systolic performance with ejection fraction of 55%.  3. Abnormal septal motion consistent with right ventricular volume/pressure overload.  4. Mild right ventricular enlargement with mildly reduced right ventricular systolic performance.  5. Small pericardial effusion without evidence of interventricular dependence.    Echocardiogram 24 August 2022:  1. Normal left ventricular size and systolic performance with ejection fraction of 55 to 60%.  2. Mild increase in left ventricular wall thickness.  3. No significant valvular heart disease.  4. Normal right ventricular size and systolic performance.  5. Normal atrial dimensions.    Regadenoson MRI 27 July 2022 (precoronary artery bypass graft surgery):  1. Pharmacological Regadenoson stress cardiac MRI is positive for a small area of inducible myocardial ischemia in the [id number] anteroseptal wall. This is in the territory of the left anterior descending artery.  2. No evidence of prior myocardial infarction, nonvascular scarring or fibrosis, or infiltrative disease.  3. Normal left ventricular size and wall thickness. Systolic function is low normal. The quantified left ventricular ejection fraction is 51%.   4. Normal right ventricular size and systolic function.  The quantified right ventricular ejection fraction is 53%.   5. No significant valvular  "abnormalities.    Coronary angiogram 10 August 2022 (precoronary artery bypass graft surgery):  1. Left main coronary artery: 20% stenosis.  2. Left anterior descending coronary artery: Mid vessel 100% stenosis with distal vessel filling via collaterals from second diagonal and second septal .  Distal LAD also filling via collaterals from PDA.  3. Circumflex coronary artery: 20% ostial stenosis.  50% mid vessel stenosis.  4. Right coronary artery: 25% PDA stenosis.    Twelve-lead ECG (personally reviewed) 5 April 2022: Sinus rhythm.  Normal ECG.           Physical Examination       /66 (BP Location: Left arm, Patient Position: Sitting, Cuff Size: Adult Regular)   Pulse 71   Resp 16   Ht 1.88 m (6' 2\")   Wt 84.8 kg (187 lb)   BMI 24.01 kg/m          Wt Readings from Last 3 Encounters:   10/26/22 84.8 kg (187 lb)   10/10/22 87.5 kg (193 lb)   09/15/22 88.2 kg (194 lb 8 oz)       The patient is alert and oriented times three. Sclerae are anicteric. Mucosal membranes are moist. Jugular venous pressure is normal. No significant adenopathy/thyromegally appreciated. Lungs are clear with good expansion. On cardiovascular exam, the patient has a regular S1 and S2. Abdomen is soft and non-tender. Extremities reveal no clubbing, cyanosis, or edema.         Medications  Allergies   Current Outpatient Medications   Medication Sig Dispense Refill     acetaminophen (TYLENOL) 325 MG tablet Take 2 tablets (650 mg) by mouth every 4 hours as needed for other, fever or headaches (For optimal non-opioid multimodal pain management to improve pain control.) 30 tablet 0     aspirin (ASA) 81 MG chewable tablet 2 tablets (162 mg) by Oral or NG Tube route daily 60 tablet 3     atorvastatin (LIPITOR) 80 MG tablet Take 1 tablet (80 mg) by mouth daily 90 tablet 3     finasteride (PROSCAR) 5 MG tablet Take 5 mg by mouth daily       FLUoxetine (PROZAC) 20 MG capsule Take 60 mg by mouth daily       glipiZIDE (GLUCOTROL XL) 5 MG " 24 hr tablet Take 5 mg by mouth 2 times daily (before meals)       lactobacillus rhamnosus (GG) (CULTURELL) capsule Take 1 capsule by mouth daily       Lancets (ONETOUCH DELICA PLUS [id number]) MISC See Admin Instructions       levothyroxine (SYNTHROID/LEVOTHROID) 25 MCG tablet        metFORMIN (GLUCOPHAGE) 1000 MG tablet [METFORMIN (GLUCOPHAGE) 1000 MG TABLET] Take 1,000 mg by mouth 2 (two) times a day with meals.       nitroGLYcerin (NITROSTAT) 0.4 MG sublingual tablet For chest pain place 1 tablet under the tongue every 5 minutes for 3 doses. If symptoms persist 5 minutes after 1st dose call 911. 25 tablet 1     tamsulosin (FLOMAX) 0.4 mg Cp24 Take 0.4 mg by mouth every evening       vitamin D3 (CHOLECALCIFEROL) 50 mcg (2000 units) tablet Take 2 tablets by mouth daily       BD PEN NEEDLE MICRO U/F 32G X 6 MM miscellaneous 1 4 TIMES DAILY AS DIRECTED AS NEEDED E11.42 (Patient not taking: Reported on 10/10/2022)       insulin pen needle (32G X 4 MM) 32G X 4 MM miscellaneous Use pen needles as directed. (Patient not taking: Reported on 10/10/2022) 100 each 0     No Known Allergies       Lab Results    Chemistry/lipid CBC Cardiac Enzymes/BNP/TSH/INR   Recent Labs   Lab Test 08/10/22  0710   CHOL 113   HDL 31*   LDL 69   TRIG 64     Recent Labs   Lab Test 08/10/22  0710 11/04/21  0914 09/29/20  1653   LDL 69 140* 83     Recent Labs   Lab Test 10/25/22  1052 09/20/22  1204   NA  --  131*   POTASSIUM  --  4.4   CHLORIDE  --  95*   CO2  --  23   * 100*   BUN  --  23.4*   CR  --  1.00   GFRESTIMATED  --  80   EVELYNE  --  9.1     Recent Labs   Lab Test 09/20/22  1204 09/16/22  0423 09/15/22  0843   CR 1.00 0.76 0.93     Recent Labs   Lab Test 08/26/22  0905   A1C 6.1*          Recent Labs   Lab Test 09/16/22  0423 09/14/22  0507   WBC  --  8.7   HGB 10.5* 10.5*   HCT  --  31.7*   MCV  --  94   PLT  --  585*     Recent Labs   Lab Test 09/16/22  0423 09/14/22  0507 09/13/22  1243   HGB 10.5* 10.5* 9.7*    Recent Labs    Lab Test 09/14/22  0507 09/13/22  2334 09/13/22  1243   TROPONINI 0.03 0.02 0.03     Recent Labs   Lab Test 09/13/22  1243   *     Recent Labs   Lab Test 09/20/22  1204   TSH 6.39*     Recent Labs   Lab Test 09/13/22  1243 09/02/22  1200 09/02/22  1113   INR 1.01 1.37* 1.43*        Medical History  Surgical History Family History Social History   Past Medical History:   Diagnosis Date     BPH (benign prostatic hyperplasia)      Cerebral infarction (H)      Depression      Diabetes (H)      Diabetes mellitus (H)      Hyperlipidemia LDL goal <100      DANIELLE (obstructive sleep apnea)      Tardive dyskinesia 2019     Past Surgical History:   Procedure Laterality Date     BYPASS GRAFT ARTERY CORONARY N/A 9/2/2022    Procedure: CORONARY ARTERY BYPASS GRAFT (CABG)X2 LEFT LEG ENDOSCOPIC SAPHENOUS VESSEL PROCUREMENT, LEFT INTERNAL MAMMARY ARTERY HARVEST, ANESTHESIA TRANSESOPHAGEAL ECHOCARDIOGRAM;EPI AORTIC ULTRASOUND;  Surgeon: Carline Bradshaw MD;  Location: [facility] Main OR     CV CORONARY ANGIOGRAM N/A 8/10/2022    Procedure: Coronary Angiogram;  Surgeon: Annette Leavitt MD;  Location: [facility] CATH LAB CV     CV LEFT HEART CATH N/A 8/10/2022    Procedure: Left Heart Catheterization;  Surgeon: Annette Leavitt MD;  Location: [facility] CATH LAB CV     tendon right wrist Right      No family history on file.     Social History     Socioeconomic History     Marital status:      Spouse name: Not on file     Number of children: Not on file     Years of education: Not on file     Highest education level: Not on file   Occupational History     Not on file   Tobacco Use     Smoking status: Never     Smokeless tobacco: Never   Vaping Use     Vaping Use: Never used   Substance and Sexual Activity     Alcohol use: Not Currently     Drug use: Never     Sexual activity: Not on file   Other Topics Concern     Parent/sibling w/ CABG, MI or angioplasty before 65F 55M? Not Asked   Social History Narrative     Not on file      Social Determinants of Health     Financial Resource Strain: Not on file   Food Insecurity: Not on file   Transportation Needs: Not on file   Physical Activity: Not on file   Stress: Not on file   Social Connections: Not on file   Intimate Partner Violence: Not on file   Housing Stability: Not on file                    Thank you for allowing me to participate in the care of your patient.      Sincerely,     Sreedhar Grande MD     [facility]  cc:   No referring provider defined for this encounter.

## 2022-10-26 NOTE — PROGRESS NOTES
[facility]   [address], [place] [address]   [email or web address]   OFFICE: [phone]          Thank you Misha Abraham for asking the [facility] team to participate in the care of your patient, Bala Hopper.     Impression and Plan       1.  Coronary artery disease.  Bala has known coronary artery disease.  Specifically, he underwent two-vessel coronary artery bypass graft surgery 2 September 2022 with ARRON graft to the LAD and saphenous vein graft to the diagonal branch.    He denies any anginal chest pain.  As noted below, he has had some tendency toward lower blood pressures.  Plan:    Discontinue low-dose metoprolol.    2.   Dyslipidemia.  Lipid profile 10 August 2022 revealed LDL 69 mg/dL and HDL 31 mg/dL.  Continue statin therapy.    4.  Obstructive sleep apnea.  Bala does have a history of obstructive sleep apnea and uses CPAP.    Plan on follow-up in 6 months.    35 minutes spent reviewing prior records (including documentation, laboratory studies, cardiac testing/imaging), interview with patient along with physical exam, planning, and subsequent documentation/crafting of note).           History of Present Illness    Once again I would like to thank you again for asking me to participate in the care of your patient, Bala Hopper.  As you know, but to reiterate for my own records, Bala Hopper is a 72 year old male with known coronary artery disease.  Specifically, Bala underwent two-vessel coronary artery bypass graft surgery 2 September 2022 with ARRON graft to the LAD and saphenous vein graft to the diagonal branch.    On further interview, Bala denies any anginal type chest pain.  He is breathing has been comfortable.  He has had some tendency toward lower blood pressure with some associated lightheadedness, however.  He has been participating in cardiac rehab without incident.  Parenthetically, he  tells me that he has been diagnosed with some degree of hydrocephalus and is is to see a neurologist.    Further review of systems is otherwise negative/noncontributory (medical record and 13 point review of systems reviewed as well and pertinent positives noted).         Cardiac Diagnostics      Echocardiogram 14 September 2022:  1. Limited echocardiographic study.  2. Normal left ventricular size and systolic performance with ejection fraction of 55%.  3. Abnormal septal motion consistent with right ventricular volume/pressure overload.  4. Mild right ventricular enlargement with mildly reduced right ventricular systolic performance.  5. Small pericardial effusion without evidence of interventricular dependence.    Echocardiogram 24 August 2022:  1. Normal left ventricular size and systolic performance with ejection fraction of 55 to 60%.  2. Mild increase in left ventricular wall thickness.  3. No significant valvular heart disease.  4. Normal right ventricular size and systolic performance.  5. Normal atrial dimensions.    Regadenoson MRI 27 July 2022 (precoronary artery bypass graft surgery):  1. Pharmacological Regadenoson stress cardiac MRI is positive for a small area of inducible myocardial ischemia in the [id number] anteroseptal wall. This is in the territory of the left anterior descending artery.  2. No evidence of prior myocardial infarction, nonvascular scarring or fibrosis, or infiltrative disease.  3. Normal left ventricular size and wall thickness. Systolic function is low normal. The quantified left ventricular ejection fraction is 51%.   4. Normal right ventricular size and systolic function.  The quantified right ventricular ejection fraction is 53%.   5. No significant valvular abnormalities.    Coronary angiogram 10 August 2022 (precoronary artery bypass graft surgery):  1. Left main coronary artery: 20% stenosis.  2. Left anterior descending coronary artery: Mid vessel 100% stenosis with  "distal vessel filling via collaterals from second diagonal and second septal .  Distal LAD also filling via collaterals from PDA.  3. Circumflex coronary artery: 20% ostial stenosis.  50% mid vessel stenosis.  4. Right coronary artery: 25% PDA stenosis.    Twelve-lead ECG (personally reviewed) 5 April 2022: Sinus rhythm.  Normal ECG.           Physical Examination       /66 (BP Location: Left arm, Patient Position: Sitting, Cuff Size: Adult Regular)   Pulse 71   Resp 16   Ht 1.88 m (6' 2\")   Wt 84.8 kg (187 lb)   BMI 24.01 kg/m          Wt Readings from Last 3 Encounters:   10/26/22 84.8 kg (187 lb)   10/10/22 87.5 kg (193 lb)   09/15/22 88.2 kg (194 lb 8 oz)       The patient is alert and oriented times three. Sclerae are anicteric. Mucosal membranes are moist. Jugular venous pressure is normal. No significant adenopathy/thyromegally appreciated. Lungs are clear with good expansion. On cardiovascular exam, the patient has a regular S1 and S2. Abdomen is soft and non-tender. Extremities reveal no clubbing, cyanosis, or edema.         Medications  Allergies   Current Outpatient Medications   Medication Sig Dispense Refill     acetaminophen (TYLENOL) 325 MG tablet Take 2 tablets (650 mg) by mouth every 4 hours as needed for other, fever or headaches (For optimal non-opioid multimodal pain management to improve pain control.) 30 tablet 0     aspirin (ASA) 81 MG chewable tablet 2 tablets (162 mg) by Oral or NG Tube route daily 60 tablet 3     atorvastatin (LIPITOR) 80 MG tablet Take 1 tablet (80 mg) by mouth daily 90 tablet 3     finasteride (PROSCAR) 5 MG tablet Take 5 mg by mouth daily       FLUoxetine (PROZAC) 20 MG capsule Take 60 mg by mouth daily       glipiZIDE (GLUCOTROL XL) 5 MG 24 hr tablet Take 5 mg by mouth 2 times daily (before meals)       lactobacillus rhamnosus (GG) (CULTURELL) capsule Take 1 capsule by mouth daily       Lancets (ONETOUCH DELICA PLUS [id number]) MISC See Admin " Instructions       levothyroxine (SYNTHROID/LEVOTHROID) 25 MCG tablet        metFORMIN (GLUCOPHAGE) 1000 MG tablet [METFORMIN (GLUCOPHAGE) 1000 MG TABLET] Take 1,000 mg by mouth 2 (two) times a day with meals.       nitroGLYcerin (NITROSTAT) 0.4 MG sublingual tablet For chest pain place 1 tablet under the tongue every 5 minutes for 3 doses. If symptoms persist 5 minutes after 1st dose call 911. 25 tablet 1     tamsulosin (FLOMAX) 0.4 mg Cp24 Take 0.4 mg by mouth every evening       vitamin D3 (CHOLECALCIFEROL) 50 mcg (2000 units) tablet Take 2 tablets by mouth daily       BD PEN NEEDLE MICRO U/F 32G X 6 MM miscellaneous 1 4 TIMES DAILY AS DIRECTED AS NEEDED E11.42 (Patient not taking: Reported on 10/10/2022)       insulin pen needle (32G X 4 MM) 32G X 4 MM miscellaneous Use pen needles as directed. (Patient not taking: Reported on 10/10/2022) 100 each 0     No Known Allergies       Lab Results    Chemistry/lipid CBC Cardiac Enzymes/BNP/TSH/INR   Recent Labs   Lab Test 08/10/22  0710   CHOL 113   HDL 31*   LDL 69   TRIG 64     Recent Labs   Lab Test 08/10/22  0710 11/04/21  0914 09/29/20  1653   LDL 69 140* 83     Recent Labs   Lab Test 10/25/22  1052 09/20/22  1204   NA  --  131*   POTASSIUM  --  4.4   CHLORIDE  --  95*   CO2  --  23   * 100*   BUN  --  23.4*   CR  --  1.00   GFRESTIMATED  --  80   EVELYNE  --  9.1     Recent Labs   Lab Test 09/20/22  1204 09/16/22  0423 09/15/22  0843   CR 1.00 0.76 0.93     Recent Labs   Lab Test 08/26/22  0905   A1C 6.1*          Recent Labs   Lab Test 09/16/22  0423 09/14/22  0507   WBC  --  8.7   HGB 10.5* 10.5*   HCT  --  31.7*   MCV  --  94   PLT  --  585*     Recent Labs   Lab Test 09/16/22  0423 09/14/22  0507 09/13/22  1243   HGB 10.5* 10.5* 9.7*    Recent Labs   Lab Test 09/14/22  0507 09/13/22  2334 09/13/22  1243   TROPONINI 0.03 0.02 0.03     Recent Labs   Lab Test 09/13/22  1243   *     Recent Labs   Lab Test 09/20/22  1204   TSH 6.39*     Recent Labs   Lab  Test 09/13/22  1243 09/02/22  1200 09/02/22  1113   INR 1.01 1.37* 1.43*        Medical History  Surgical History Family History Social History   Past Medical History:   Diagnosis Date     BPH (benign prostatic hyperplasia)      Cerebral infarction (H)      Depression      Diabetes (H)      Diabetes mellitus (H)      Hyperlipidemia LDL goal <100      DANIELLE (obstructive sleep apnea)      Tardive dyskinesia 2019     Past Surgical History:   Procedure Laterality Date     BYPASS GRAFT ARTERY CORONARY N/A 9/2/2022    Procedure: CORONARY ARTERY BYPASS GRAFT (CABG)X2 LEFT LEG ENDOSCOPIC SAPHENOUS VESSEL PROCUREMENT, LEFT INTERNAL MAMMARY ARTERY HARVEST, ANESTHESIA TRANSESOPHAGEAL ECHOCARDIOGRAM;EPI AORTIC ULTRASOUND;  Surgeon: Carline Bradshaw MD;  Location: [facility] Main OR     CV CORONARY ANGIOGRAM N/A 8/10/2022    Procedure: Coronary Angiogram;  Surgeon: Annette Leavitt MD;  Location: [facility] CATH LAB CV     CV LEFT HEART CATH N/A 8/10/2022    Procedure: Left Heart Catheterization;  Surgeon: Annette Leavitt MD;  Location: [facility] CATH LAB CV     tendon right wrist Right      No family history on file.     Social History     Socioeconomic History     Marital status:      Spouse name: Not on file     Number of children: Not on file     Years of education: Not on file     Highest education level: Not on file   Occupational History     Not on file   Tobacco Use     Smoking status: Never     Smokeless tobacco: Never   Vaping Use     Vaping Use: Never used   Substance and Sexual Activity     Alcohol use: Not Currently     Drug use: Never     Sexual activity: Not on file   Other Topics Concern     Parent/sibling w/ CABG, MI or angioplasty before 65F 55M? Not Asked   Social History Narrative     Not on file     Social Determinants of Health     Financial Resource Strain: Not on file   Food Insecurity: Not on file   Transportation Needs: Not on file   Physical Activity: Not on file   Stress: Not on file   Social  Connections: Not on file   Intimate Partner Violence: Not on file   Housing Stability: Not on file

## 2022-10-27 ENCOUNTER — HOSPITAL ENCOUNTER (OUTPATIENT)
Dept: CARDIAC REHAB | Facility: HOSPITAL | Age: 72
Discharge: HOME | End: 2022-10-27
Attending: INTERNAL MEDICINE
Payer: COMMERCIAL

## 2022-10-27 PROCEDURE — 93798 PHYS/QHP OP CAR RHAB W/ECG: CPT

## 2022-11-01 ENCOUNTER — HOSPITAL ENCOUNTER (OUTPATIENT)
Dept: CARDIAC REHAB | Facility: HOSPITAL | Age: 72
Discharge: HOME | End: 2022-11-01
Attending: INTERNAL MEDICINE
Payer: COMMERCIAL

## 2022-11-01 PROCEDURE — 93798 PHYS/QHP OP CAR RHAB W/ECG: CPT

## 2022-11-03 ENCOUNTER — HOSPITAL ENCOUNTER (OUTPATIENT)
Dept: CARDIAC REHAB | Facility: HOSPITAL | Age: 72
Discharge: HOME | End: 2022-11-03
Attending: INTERNAL MEDICINE
Payer: COMMERCIAL

## 2022-11-03 LAB — GLUCOSE BLDC GLUCOMTR-MCNC: 153 MG/DL (ref 70–99)

## 2022-11-03 PROCEDURE — 93798 PHYS/QHP OP CAR RHAB W/ECG: CPT

## 2022-11-08 ENCOUNTER — HOSPITAL ENCOUNTER (OUTPATIENT)
Dept: CARDIAC REHAB | Facility: HOSPITAL | Age: 72
Discharge: HOME | End: 2022-11-08
Attending: INTERNAL MEDICINE
Payer: COMMERCIAL

## 2022-11-08 PROCEDURE — 93798 PHYS/QHP OP CAR RHAB W/ECG: CPT

## 2022-11-10 ENCOUNTER — HOSPITAL ENCOUNTER (OUTPATIENT)
Dept: CARDIAC REHAB | Facility: HOSPITAL | Age: 72
Discharge: HOME | End: 2022-11-10
Attending: INTERNAL MEDICINE
Payer: COMMERCIAL

## 2022-11-10 PROCEDURE — 93798 PHYS/QHP OP CAR RHAB W/ECG: CPT

## 2022-11-15 ENCOUNTER — HOSPITAL ENCOUNTER (OUTPATIENT)
Dept: CARDIAC REHAB | Facility: HOSPITAL | Age: 72
Discharge: HOME | End: 2022-11-15
Attending: INTERNAL MEDICINE
Payer: COMMERCIAL

## 2022-11-15 PROCEDURE — 93798 PHYS/QHP OP CAR RHAB W/ECG: CPT

## 2022-11-17 ENCOUNTER — HOSPITAL ENCOUNTER (OUTPATIENT)
Dept: CARDIAC REHAB | Facility: HOSPITAL | Age: 72
Discharge: HOME | End: 2022-11-17
Attending: INTERNAL MEDICINE
Payer: COMMERCIAL

## 2022-11-17 PROCEDURE — 93798 PHYS/QHP OP CAR RHAB W/ECG: CPT

## 2022-11-29 ENCOUNTER — HOSPITAL ENCOUNTER (OUTPATIENT)
Dept: CARDIAC REHAB | Facility: HOSPITAL | Age: 72
Discharge: HOME | End: 2022-11-29
Attending: INTERNAL MEDICINE
Payer: COMMERCIAL

## 2022-11-29 PROCEDURE — 93798 PHYS/QHP OP CAR RHAB W/ECG: CPT

## 2022-12-06 ENCOUNTER — HOSPITAL ENCOUNTER (OUTPATIENT)
Dept: CARDIAC REHAB | Facility: HOSPITAL | Age: 72
Discharge: HOME | End: 2022-12-06
Attending: INTERNAL MEDICINE
Payer: COMMERCIAL

## 2022-12-06 PROCEDURE — 93798 PHYS/QHP OP CAR RHAB W/ECG: CPT

## 2022-12-08 ENCOUNTER — HOSPITAL ENCOUNTER (OUTPATIENT)
Dept: CARDIAC REHAB | Facility: HOSPITAL | Age: 72
Discharge: HOME | End: 2022-12-08
Attending: INTERNAL MEDICINE
Payer: COMMERCIAL

## 2022-12-08 LAB — GLUCOSE BLDC GLUCOMTR-MCNC: 106 MG/DL (ref 70–99)

## 2022-12-08 PROCEDURE — 93798 PHYS/QHP OP CAR RHAB W/ECG: CPT

## 2022-12-13 ENCOUNTER — HOSPITAL ENCOUNTER (OUTPATIENT)
Dept: CARDIAC REHAB | Facility: HOSPITAL | Age: 72
Discharge: HOME | End: 2022-12-13
Attending: INTERNAL MEDICINE
Payer: COMMERCIAL

## 2022-12-13 PROCEDURE — 93798 PHYS/QHP OP CAR RHAB W/ECG: CPT

## 2022-12-15 ENCOUNTER — HOSPITAL ENCOUNTER (OUTPATIENT)
Dept: CARDIAC REHAB | Facility: HOSPITAL | Age: 72
Discharge: HOME | End: 2022-12-15
Attending: INTERNAL MEDICINE
Payer: COMMERCIAL

## 2022-12-15 PROCEDURE — 93798 PHYS/QHP OP CAR RHAB W/ECG: CPT

## 2022-12-20 ENCOUNTER — HOSPITAL ENCOUNTER (OUTPATIENT)
Dept: CARDIAC REHAB | Facility: HOSPITAL | Age: 72
Discharge: HOME | End: 2022-12-20
Attending: INTERNAL MEDICINE
Payer: COMMERCIAL

## 2022-12-20 PROCEDURE — 93798 PHYS/QHP OP CAR RHAB W/ECG: CPT

## 2022-12-22 ENCOUNTER — HOSPITAL ENCOUNTER (OUTPATIENT)
Dept: CARDIAC REHAB | Facility: HOSPITAL | Age: 72
Discharge: HOME | End: 2022-12-22
Attending: INTERNAL MEDICINE
Payer: COMMERCIAL

## 2022-12-22 PROCEDURE — 93798 PHYS/QHP OP CAR RHAB W/ECG: CPT

## 2022-12-27 ENCOUNTER — HOSPITAL ENCOUNTER (OUTPATIENT)
Dept: CARDIAC REHAB | Facility: HOSPITAL | Age: 72
Discharge: HOME | End: 2022-12-27
Attending: INTERNAL MEDICINE
Payer: COMMERCIAL

## 2022-12-27 PROCEDURE — 93798 PHYS/QHP OP CAR RHAB W/ECG: CPT

## 2022-12-29 ENCOUNTER — HOSPITAL ENCOUNTER (OUTPATIENT)
Dept: CARDIAC REHAB | Facility: HOSPITAL | Age: 72
Discharge: HOME | End: 2022-12-29
Attending: INTERNAL MEDICINE
Payer: COMMERCIAL

## 2022-12-29 PROCEDURE — 93798 PHYS/QHP OP CAR RHAB W/ECG: CPT

## 2023-01-03 ENCOUNTER — HOSPITAL ENCOUNTER (OUTPATIENT)
Dept: CARDIAC REHAB | Facility: HOSPITAL | Age: 73
Discharge: HOME | End: 2023-01-03
Attending: INTERNAL MEDICINE
Payer: COMMERCIAL

## 2023-01-03 PROCEDURE — 93798 PHYS/QHP OP CAR RHAB W/ECG: CPT

## 2023-01-05 ENCOUNTER — HOSPITAL ENCOUNTER (OUTPATIENT)
Dept: CARDIAC REHAB | Facility: HOSPITAL | Age: 73
Discharge: HOME | End: 2023-01-05
Attending: INTERNAL MEDICINE
Payer: COMMERCIAL

## 2023-01-05 PROCEDURE — 93798 PHYS/QHP OP CAR RHAB W/ECG: CPT

## 2023-01-10 ENCOUNTER — HOSPITAL ENCOUNTER (OUTPATIENT)
Dept: CARDIAC REHAB | Facility: HOSPITAL | Age: 73
Discharge: HOME | End: 2023-01-10
Attending: INTERNAL MEDICINE
Payer: COMMERCIAL

## 2023-01-10 LAB — GLUCOSE BLDC GLUCOMTR-MCNC: 125 MG/DL (ref 70–99)

## 2023-01-10 PROCEDURE — 93798 PHYS/QHP OP CAR RHAB W/ECG: CPT

## 2023-01-12 ENCOUNTER — HOSPITAL ENCOUNTER (OUTPATIENT)
Dept: CARDIAC REHAB | Facility: HOSPITAL | Age: 73
Discharge: HOME | End: 2023-01-12
Attending: INTERNAL MEDICINE
Payer: COMMERCIAL

## 2023-01-12 LAB — GLUCOSE BLDC GLUCOMTR-MCNC: 192 MG/DL (ref 70–99)

## 2023-01-12 PROCEDURE — 93798 PHYS/QHP OP CAR RHAB W/ECG: CPT

## 2023-01-13 ENCOUNTER — HOSPITAL ENCOUNTER (OUTPATIENT)
Dept: CARDIAC REHAB | Facility: HOSPITAL | Age: 73
Discharge: HOME | End: 2023-01-13
Attending: INTERNAL MEDICINE
Payer: COMMERCIAL

## 2023-01-13 PROCEDURE — 93798 PHYS/QHP OP CAR RHAB W/ECG: CPT

## 2023-05-11 ENCOUNTER — LAB REQUISITION (OUTPATIENT)
Dept: LAB | Facility: CLINIC | Age: 73
End: 2023-05-11

## 2023-05-11 DIAGNOSIS — E78.5 HYPERLIPIDEMIA, UNSPECIFIED: ICD-10-CM

## 2023-05-11 DIAGNOSIS — I25.10 ATHEROSCLEROTIC HEART DISEASE OF NATIVE CORONARY ARTERY WITHOUT ANGINA PECTORIS: ICD-10-CM

## 2023-05-11 DIAGNOSIS — E03.2 HYPOTHYROIDISM DUE TO MEDICAMENTS AND OTHER EXOGENOUS SUBSTANCES: ICD-10-CM

## 2023-05-11 PROCEDURE — 80061 LIPID PANEL: CPT | Performed by: FAMILY MEDICINE

## 2023-05-11 PROCEDURE — 84443 ASSAY THYROID STIM HORMONE: CPT | Performed by: FAMILY MEDICINE

## 2023-05-11 PROCEDURE — 80069 RENAL FUNCTION PANEL: CPT | Performed by: FAMILY MEDICINE

## 2023-05-12 LAB
ALBUMIN SERPL BCG-MCNC: 3.9 G/DL (ref 3.5–5.2)
ANION GAP SERPL CALCULATED.3IONS-SCNC: 10 MMOL/L (ref 7–15)
BUN SERPL-MCNC: 16.8 MG/DL (ref 8–23)
CALCIUM SERPL-MCNC: 9.3 MG/DL (ref 8.8–10.2)
CHLORIDE SERPL-SCNC: 102 MMOL/L (ref 98–107)
CHOLEST SERPL-MCNC: 141 MG/DL
CREAT SERPL-MCNC: 0.89 MG/DL (ref 0.67–1.17)
DEPRECATED HCO3 PLAS-SCNC: 27 MMOL/L (ref 22–29)
GFR SERPL CREATININE-BSD FRML MDRD: >90 ML/MIN/1.73M2
GLUCOSE SERPL-MCNC: 92 MG/DL (ref 70–99)
HDLC SERPL-MCNC: 37 MG/DL
LDLC SERPL CALC-MCNC: 67 MG/DL
NONHDLC SERPL-MCNC: 104 MG/DL
PHOSPHATE SERPL-MCNC: 3.5 MG/DL (ref 2.5–4.5)
POTASSIUM SERPL-SCNC: 4.5 MMOL/L (ref 3.4–5.3)
SODIUM SERPL-SCNC: 139 MMOL/L (ref 136–145)
TRIGL SERPL-MCNC: 184 MG/DL
TSH SERPL DL<=0.005 MIU/L-ACNC: 1.72 UIU/ML (ref 0.3–4.2)

## 2023-05-13 ENCOUNTER — HEALTH MAINTENANCE LETTER (OUTPATIENT)
Age: 73
End: 2023-05-13

## 2023-10-15 ENCOUNTER — HEALTH MAINTENANCE LETTER (OUTPATIENT)
Age: 73
End: 2023-10-15

## 2023-12-08 ENCOUNTER — LAB REQUISITION (OUTPATIENT)
Dept: LAB | Facility: CLINIC | Age: 73
End: 2023-12-08

## 2023-12-08 DIAGNOSIS — N40.0 BENIGN PROSTATIC HYPERPLASIA WITHOUT LOWER URINARY TRACT SYMPTOMS: ICD-10-CM

## 2023-12-08 DIAGNOSIS — E11.42 TYPE 2 DIABETES MELLITUS WITH DIABETIC POLYNEUROPATHY (H): ICD-10-CM

## 2023-12-08 LAB
ALBUMIN SERPL BCG-MCNC: 4 G/DL (ref 3.5–5.2)
ANION GAP SERPL CALCULATED.3IONS-SCNC: 10 MMOL/L (ref 7–15)
BUN SERPL-MCNC: 17.5 MG/DL (ref 8–23)
CALCIUM SERPL-MCNC: 9.2 MG/DL (ref 8.8–10.2)
CHLORIDE SERPL-SCNC: 99 MMOL/L (ref 98–107)
CREAT SERPL-MCNC: 0.88 MG/DL (ref 0.67–1.17)
DEPRECATED HCO3 PLAS-SCNC: 26 MMOL/L (ref 22–29)
EGFRCR SERPLBLD CKD-EPI 2021: >90 ML/MIN/1.73M2
GLUCOSE SERPL-MCNC: 240 MG/DL (ref 70–99)
PHOSPHATE SERPL-MCNC: 3.3 MG/DL (ref 2.5–4.5)
POTASSIUM SERPL-SCNC: 4.8 MMOL/L (ref 3.4–5.3)
PSA SERPL DL<=0.01 NG/ML-MCNC: 0.19 NG/ML (ref 0–6.5)
SODIUM SERPL-SCNC: 135 MMOL/L (ref 135–145)

## 2023-12-08 PROCEDURE — G0103 PSA SCREENING: HCPCS | Performed by: FAMILY MEDICINE

## 2023-12-08 PROCEDURE — 80069 RENAL FUNCTION PANEL: CPT | Performed by: FAMILY MEDICINE

## 2023-12-24 ENCOUNTER — HEALTH MAINTENANCE LETTER (OUTPATIENT)
Age: 73
End: 2023-12-24

## 2023-12-28 RX ORDER — ATORVASTATIN CALCIUM 40 MG/1
40 TABLET, FILM COATED ORAL DAILY
COMMUNITY

## 2023-12-29 ENCOUNTER — ANESTHESIA EVENT (OUTPATIENT)
Dept: SURGERY | Facility: AMBULATORY SURGERY CENTER | Age: 73
End: 2023-12-29
Payer: COMMERCIAL

## 2024-01-02 ENCOUNTER — HOSPITAL ENCOUNTER (OUTPATIENT)
Facility: AMBULATORY SURGERY CENTER | Age: 74
Discharge: HOME | End: 2024-01-02
Attending: COLON & RECTAL SURGERY
Payer: COMMERCIAL

## 2024-01-02 ENCOUNTER — ANESTHESIA (OUTPATIENT)
Dept: SURGERY | Facility: AMBULATORY SURGERY CENTER | Age: 74
End: 2024-01-02
Payer: COMMERCIAL

## 2024-01-02 VITALS
WEIGHT: 202 LBS | HEIGHT: 74 IN | SYSTOLIC BLOOD PRESSURE: 116 MMHG | RESPIRATION RATE: 16 BRPM | BODY MASS INDEX: 25.93 KG/M2 | DIASTOLIC BLOOD PRESSURE: 68 MMHG | TEMPERATURE: 97.3 F | OXYGEN SATURATION: 97 % | HEART RATE: 63 BPM

## 2024-01-02 DIAGNOSIS — Z12.11 COLON CANCER SCREENING: ICD-10-CM

## 2024-01-02 LAB
COLONOSCOPY: NORMAL
GLUCOSE POCT: 209 MG/DL (ref 70–99)

## 2024-01-02 RX ORDER — PROPOFOL 10 MG/ML
INJECTION, EMULSION INTRAVENOUS CONTINUOUS PRN
Status: DISCONTINUED | OUTPATIENT
Start: 2024-01-02 | End: 2024-01-02

## 2024-01-02 RX ORDER — ONDANSETRON 4 MG/1
4 TABLET, ORALLY DISINTEGRATING ORAL
Status: DISCONTINUED | OUTPATIENT
Start: 2024-01-02 | End: 2024-01-03 | Stop reason: HOSPADM

## 2024-01-02 RX ORDER — SODIUM CHLORIDE, SODIUM LACTATE, POTASSIUM CHLORIDE, CALCIUM CHLORIDE 600; 310; 30; 20 MG/100ML; MG/100ML; MG/100ML; MG/100ML
INJECTION, SOLUTION INTRAVENOUS CONTINUOUS
Status: DISCONTINUED | OUTPATIENT
Start: 2024-01-02 | End: 2024-01-03 | Stop reason: HOSPADM

## 2024-01-02 RX ORDER — LIDOCAINE 40 MG/G
CREAM TOPICAL
Status: DISCONTINUED | OUTPATIENT
Start: 2024-01-02 | End: 2024-01-03 | Stop reason: HOSPADM

## 2024-01-02 RX ORDER — ONDANSETRON 4 MG/1
4 TABLET, ORALLY DISINTEGRATING ORAL EVERY 30 MIN PRN
Status: DISCONTINUED | OUTPATIENT
Start: 2024-01-02 | End: 2024-01-03 | Stop reason: HOSPADM

## 2024-01-02 RX ORDER — LIDOCAINE HYDROCHLORIDE 20 MG/ML
INJECTION, SOLUTION INFILTRATION; PERINEURAL PRN
Status: DISCONTINUED | OUTPATIENT
Start: 2024-01-02 | End: 2024-01-02

## 2024-01-02 RX ORDER — ONDANSETRON 2 MG/ML
4 INJECTION INTRAMUSCULAR; INTRAVENOUS EVERY 30 MIN PRN
Status: DISCONTINUED | OUTPATIENT
Start: 2024-01-02 | End: 2024-01-03 | Stop reason: HOSPADM

## 2024-01-02 RX ORDER — PROPOFOL 10 MG/ML
INJECTION, EMULSION INTRAVENOUS PRN
Status: DISCONTINUED | OUTPATIENT
Start: 2024-01-02 | End: 2024-01-02

## 2024-01-02 RX ADMIN — LIDOCAINE HYDROCHLORIDE 3 ML: 20 INJECTION, SOLUTION INFILTRATION; PERINEURAL at 09:54

## 2024-01-02 RX ADMIN — SODIUM CHLORIDE, SODIUM LACTATE, POTASSIUM CHLORIDE, CALCIUM CHLORIDE: 600; 310; 30; 20 INJECTION, SOLUTION INTRAVENOUS at 09:45

## 2024-01-02 RX ADMIN — PROPOFOL 200 MCG/KG/MIN: 10 INJECTION, EMULSION INTRAVENOUS at 09:54

## 2024-01-02 RX ADMIN — PROPOFOL 30 MG: 10 INJECTION, EMULSION INTRAVENOUS at 09:54

## 2024-01-02 NOTE — ANESTHESIA CARE TRANSFER NOTE
Patient: Bala Hopper    Procedure: Procedure(s):  COLONOSCOPY WITH POLYPECTOMY       Diagnosis: Colon cancer screening [Z12.11]  Diagnosis Additional Information: No value filed.    Anesthesia Type:   MAC     Note:    Oropharynx: oropharynx clear of all foreign objects and spontaneously breathing  Level of Consciousness: drowsy  Oxygen Supplementation: face mask  Level of Supplemental Oxygen (L/min / FiO2): 6  Independent Airway: airway patency satisfactory and stable  Dentition: dentition unchanged  Vital Signs Stable: post-procedure vital signs reviewed and stable  Report to RN Given: handoff report given  Patient transferred to: Phase II    Handoff Report: Identifed the Patient, Identified the Reponsible Provider, Reviewed the pertinent medical history, Discussed the surgical course, Reviewed Intra-OP anesthesia mangement and issues during anesthesia, Set expectations for post-procedure period and Allowed opportunity for questions and acknowledgement of understanding      Vitals:  Vitals Value Taken Time   /72 01/02/24 1015   Temp 97.3  F (36.3  C) 01/02/24 1015   Pulse 69 01/02/24 1015   Resp 14 01/02/24 1015   SpO2 99 % 01/02/24 1015       Electronically Signed By: ANURAG Nichols CRNA  January 2, 2024  10:17 AM

## 2024-01-02 NOTE — ANESTHESIA POSTPROCEDURE EVALUATION
Patient: Bala Hopper    Procedure: Procedure(s):  COLONOSCOPY WITH POLYPECTOMY       Anesthesia Type:  MAC    Note:  Disposition: Outpatient   Postop Pain Control: Uneventful            Sign Out: Well controlled pain   PONV: No   Neuro/Psych: Uneventful            Sign Out: Acceptable/Baseline neuro status   Airway/Respiratory: Uneventful            Sign Out: Acceptable/Baseline resp. status   CV/Hemodynamics: Uneventful            Sign Out: Acceptable CV status; No obvious hypovolemia; No obvious fluid overload   Other NRE: NONE   DID A NON-ROUTINE EVENT OCCUR? No           Last vitals:  Vitals Value Taken Time   /68 01/02/24 1045   Temp 97.3  F (36.3  C) 01/02/24 1015   Pulse 63 01/02/24 1045   Resp 16 01/02/24 1045   SpO2 97 % 01/02/24 1045       Electronically Signed By: Gideon Berman MD  January 2, 2024  1:05 PM

## 2024-01-02 NOTE — H&P
[facility] Pre-procedure H&P    1/2/2024     Primary Care Physician  Misha Dawson     Chief Complaint/Reason for Procedure:             screening    History and Physical:   Bala Hopper is a 73 year old male presenting for colonoscopy for screening purposes. He reports no GI related symptoms      Past Medical History   I have reviewed this patient's medical history and updated it with pertinent information if needed.   Past Medical History:   Diagnosis Date    BPH (benign prostatic hyperplasia)     Cerebral infarction (H)     Coronary artery disease     Depression     Diabetes (H)     Diabetes mellitus (H)     Hyperlipidemia LDL goal <100     DANIELLE (obstructive sleep apnea)     Tardive dyskinesia 2019    Thyroid disease        Past Surgical History   I have reviewed this patient's surgical history and updated it with pertinent information if needed.  Past Surgical History:   Procedure Laterality Date    BYPASS GRAFT ARTERY CORONARY N/A 9/2/2022    Procedure: CORONARY ARTERY BYPASS GRAFT (CABG)X2 LEFT LEG ENDOSCOPIC SAPHENOUS VESSEL PROCUREMENT, LEFT INTERNAL MAMMARY ARTERY HARVEST, ANESTHESIA TRANSESOPHAGEAL ECHOCARDIOGRAM;EPI AORTIC ULTRASOUND;  Surgeon: Carline Bradshaw MD;  Location: [facility] Main OR    CV CORONARY ANGIOGRAM N/A 8/10/2022    Procedure: Coronary Angiogram;  Surgeon: Annette Leavitt MD;  Location: [facility] CATH LAB CV    CV LEFT HEART CATH N/A 8/10/2022    Procedure: Left Heart Catheterization;  Surgeon: Annette Leavitt MD;  Location: [facility] CATH LAB CV    tendon right wrist Right        Allergies:  No Known Allergies      Prior to Admission Medications   Cannot display prior to admission medications because the patient has not been admitted in this contact.     Allergies   No Known Allergies    Social History   I have reviewed this patient's social history and updated it with pertinent information if needed. Bala Hopper  reports that he has never smoked. He has  never used smokeless tobacco. He reports that he does not currently use alcohol. He reports that he does not use drugs.    Family History   I have reviewed this patient's family history and updated it with pertinent information if needed.   History reviewed. No pertinent family history.    Review of Systems   The 10 point Review of Systems is negative other than noted in the HPI or here.     Physical Exam   Temp: 96.9  F (36.1  C) Temp src: Temporal BP: (!) 141/72     Resp: 16 SpO2: 96 % O2 Device: None (Room air)    Vital Signs with Ranges  Temp:  [96.9  F (36.1  C)] 96.9  F (36.1  C)  Resp:  [16] 16  BP: (141)/(72) 141/72  SpO2:  [96 %] 96 %  202 lbs 0 oz    Aaox3, nad  Airway normal  Lungs cta b  RRR        Assessment/Plan:  Bala Hopper presents for colonoscopy. Risks and benefits of colonoscopy discussed in detail and informed consent obtained.      - proceed with colonoscopy    Tonie Lawson MD, MS  [facility]  Office: [phone]  1/2/2024 9:38 AM

## 2024-01-02 NOTE — PROGRESS NOTES
Pt and family verbalize good understanding of discharge teach and follow up with MD.   VSS,Surgical incision CDI. D/C criteria met. Pt verbalizes readiness to go home.   Home with wife.     @ME2@ 1/2/2024 10:52 AM

## 2024-01-02 NOTE — ANESTHESIA PREPROCEDURE EVALUATION
Anesthesia Pre-Procedure Evaluation    Patient: Bala Hopper   MRN: [id number] : 1950        Procedure : Procedure(s):  COLONOSCOPY          Past Medical History:   Diagnosis Date    BPH (benign prostatic hyperplasia)     Cerebral infarction (H)     Coronary artery disease     Depression     Diabetes (H)     Diabetes mellitus (H)     Hyperlipidemia LDL goal <100     DANILELE (obstructive sleep apnea)     Tardive dyskinesia 2019    Thyroid disease       Past Surgical History:   Procedure Laterality Date    BYPASS GRAFT ARTERY CORONARY N/A 2022    Procedure: CORONARY ARTERY BYPASS GRAFT (CABG)X2 LEFT LEG ENDOSCOPIC SAPHENOUS VESSEL PROCUREMENT, LEFT INTERNAL MAMMARY ARTERY HARVEST, ANESTHESIA TRANSESOPHAGEAL ECHOCARDIOGRAM;EPI AORTIC ULTRASOUND;  Surgeon: Carline Bradshaw MD;  Location: [facility] Main OR    CV CORONARY ANGIOGRAM N/A 8/10/2022    Procedure: Coronary Angiogram;  Surgeon: Annette Leavitt MD;  Location: [facility] CATH LAB CV    CV LEFT HEART CATH N/A 8/10/2022    Procedure: Left Heart Catheterization;  Surgeon: Annette Leavitt MD;  Location: [facility] CATH LAB CV    tendon right wrist Right       No Known Allergies   Social History     Tobacco Use    Smoking status: Never    Smokeless tobacco: Never   Substance Use Topics    Alcohol use: Not Currently      Wt Readings from Last 1 Encounters:   23 91.6 kg (202 lb)        Anesthesia Evaluation   Pt has had prior anesthetic. Type: General.    No history of anesthetic complications       ROS/MED HX  ENT/Pulmonary:     (+) sleep apnea,                                       Neurologic:     (+)          CVA,                      Cardiovascular:     (+)  - -  CAD -  CABG-date: 2022. -                                   (-) murmur   METS/Exercise Tolerance:     Hematologic:  - neg hematologic  ROS     Musculoskeletal:  - neg musculoskeletal ROS     GI/Hepatic:     (+)        bowel prep,            Renal/Genitourinary:       Endo:     (+)  type  "II DM,        thyroid problem,            Psychiatric/Substance Use:  - neg psychiatric ROS     Infectious Disease:       Malignancy:       Other:  - neg other ROS          Physical Exam    Airway  airway exam normal      Mallampati: II   TM distance: > 3 FB   Neck ROM: full   Mouth opening: > 3 cm    Respiratory Devices and Support         Dental       (+) Completely normal teeth      Cardiovascular   cardiovascular exam normal       Rhythm and rate: regular and normal (-) no murmur    Pulmonary   pulmonary exam normal        breath sounds clear to auscultation           OUTSIDE LABS:  CBC:   Lab Results   Component Value Date    WBC 8.7 09/14/2022    WBC 13.3 (H) 09/13/2022    HGB 10.5 (L) 09/16/2022    HGB 10.5 (L) 09/14/2022    HCT 31.7 (L) 09/14/2022    HCT 28.8 (L) 09/13/2022     (H) 09/14/2022     (H) 09/13/2022     BMP:   Lab Results   Component Value Date     12/08/2023     05/11/2023    POTASSIUM 4.8 12/08/2023    POTASSIUM 4.5 05/11/2023    CHLORIDE 99 12/08/2023    CHLORIDE 102 05/11/2023    CO2 26 12/08/2023    CO2 27 05/11/2023    BUN 17.5 12/08/2023    BUN 16.8 05/11/2023    CR 0.88 12/08/2023    CR 0.89 05/11/2023     (H) 12/08/2023    GLC 92 05/11/2023     COAGS:   Lab Results   Component Value Date    PTT 27 09/13/2022    INR 1.01 09/13/2022    FIBR 257 09/02/2022     POC: No results found for: \"BGM\", \"HCG\", \"HCGS\"  HEPATIC:   Lab Results   Component Value Date    ALBUMIN 4.0 12/08/2023    PROTTOTAL 6.1 (L) 09/20/2022    ALT 29 09/20/2022    AST 21 09/20/2022    ALKPHOS 126 09/20/2022    BILITOTAL 0.5 09/20/2022     OTHER:   Lab Results   Component Value Date    PH 7.44 09/07/2022    LACT 1.8 09/05/2022    A1C 6.1 (H) 08/26/2022    EVELYNE 9.2 12/08/2023    PHOS 3.3 12/08/2023    MAG 1.9 09/16/2022    TSH 1.72 05/11/2023    CRP 0.2 03/26/2019    SED 5 03/26/2019       Anesthesia Plan    ASA Status:  3    NPO Status:  NPO Appropriate    Anesthesia Type: MAC.     - Reason " "for MAC: straight local not clinically adequate              Consents    Anesthesia Plan(s) and associated risks, benefits, and realistic alternatives discussed. Questions answered and patient/representative(s) expressed understanding.     - Discussed: Risks, Benefits and Alternatives for BOTH SEDATION and the PROCEDURE were discussed     - Discussed with:  Patient            Postoperative Care    Pain management: IV analgesics, Oral pain medications, Multi-modal analgesia.   PONV prophylaxis: Ondansetron (or other 5HT-3), Background Propofol Infusion     Comments:    Other Comments: Risks of MAC anesthesia including but not limited to recall, awareness, and potential conversion to general anesthesia discussed.           Gideon Berman MD    I have reviewed the pertinent notes and labs in the chart from the past 30 days and (re)examined the patient.  Any updates or changes from those notes are reflected in this note.     # Hyponatremia: Lowest Na = 135 mmol/L in last 30 days, will monitor as appropriate         # Drug Induced Platelet Defect: home medication list includes an antiplatelet medication  # Overweight: Estimated body mass index is 25.94 kg/m  as calculated from the following:    Height as of this encounter: 1.88 m (6' 2\").    Weight as of this encounter: 91.6 kg (202 lb).      "

## 2024-04-02 ENCOUNTER — LAB REQUISITION (OUTPATIENT)
Dept: LAB | Facility: CLINIC | Age: 74
End: 2024-04-02

## 2024-04-02 ENCOUNTER — TRANSFERRED RECORDS (OUTPATIENT)
Dept: HEALTH INFORMATION MANAGEMENT | Facility: CLINIC | Age: 74
End: 2024-04-02
Payer: COMMERCIAL

## 2024-04-02 DIAGNOSIS — E03.2 HYPOTHYROIDISM DUE TO MEDICAMENTS AND OTHER EXOGENOUS SUBSTANCES: ICD-10-CM

## 2024-04-02 DIAGNOSIS — E55.9 VITAMIN D DEFICIENCY, UNSPECIFIED: ICD-10-CM

## 2024-04-02 DIAGNOSIS — E78.5 HYPERLIPIDEMIA, UNSPECIFIED: ICD-10-CM

## 2024-04-02 DIAGNOSIS — E11.42 TYPE 2 DIABETES MELLITUS WITH DIABETIC POLYNEUROPATHY (H): ICD-10-CM

## 2024-04-02 LAB
ALBUMIN SERPL BCG-MCNC: 4 G/DL (ref 3.5–5.2)
ALP SERPL-CCNC: 85 U/L (ref 40–150)
ALT SERPL W P-5'-P-CCNC: 24 U/L (ref 0–70)
ANION GAP SERPL CALCULATED.3IONS-SCNC: 12 MMOL/L (ref 7–15)
AST SERPL W P-5'-P-CCNC: 14 U/L (ref 0–45)
BILIRUB SERPL-MCNC: 0.3 MG/DL
BUN SERPL-MCNC: 16.4 MG/DL (ref 8–23)
CALCIUM SERPL-MCNC: 9 MG/DL (ref 8.8–10.2)
CHLORIDE SERPL-SCNC: 104 MMOL/L (ref 98–107)
CHOLEST SERPL-MCNC: 123 MG/DL
CREAT SERPL-MCNC: 0.92 MG/DL (ref 0.67–1.17)
CREAT UR-MCNC: 177 MG/DL
DEPRECATED HCO3 PLAS-SCNC: 25 MMOL/L (ref 22–29)
EGFRCR SERPLBLD CKD-EPI 2021: 88 ML/MIN/1.73M2
ERYTHROCYTE [DISTWIDTH] IN BLOOD BY AUTOMATED COUNT: 13.2 % (ref 10–15)
FASTING STATUS PATIENT QL REPORTED: ABNORMAL
GLUCOSE SERPL-MCNC: 200 MG/DL (ref 70–99)
HCT VFR BLD AUTO: 37.9 % (ref 40–53)
HDLC SERPL-MCNC: 37 MG/DL
HGB BLD-MCNC: 13 G/DL (ref 13.3–17.7)
LDLC SERPL CALC-MCNC: 68 MG/DL
MCH RBC QN AUTO: 32.3 PG (ref 26.5–33)
MCHC RBC AUTO-ENTMCNC: 34.3 G/DL (ref 31.5–36.5)
MCV RBC AUTO: 94 FL (ref 78–100)
MICROALBUMIN UR-MCNC: 62 MG/L
MICROALBUMIN/CREAT UR: 35.03 MG/G CR (ref 0–17)
NONHDLC SERPL-MCNC: 86 MG/DL
PLATELET # BLD AUTO: 372 10E3/UL (ref 150–450)
POTASSIUM SERPL-SCNC: 4.1 MMOL/L (ref 3.4–5.3)
PROT SERPL-MCNC: 6.7 G/DL (ref 6.4–8.3)
RBC # BLD AUTO: 4.03 10E6/UL (ref 4.4–5.9)
SODIUM SERPL-SCNC: 141 MMOL/L (ref 135–145)
TRIGL SERPL-MCNC: 91 MG/DL
TSH SERPL DL<=0.005 MIU/L-ACNC: 3.33 UIU/ML (ref 0.3–4.2)
VIT D+METAB SERPL-MCNC: 51 NG/ML (ref 20–50)
WBC # BLD AUTO: 6.3 10E3/UL (ref 4–11)

## 2024-04-02 PROCEDURE — 84443 ASSAY THYROID STIM HORMONE: CPT | Performed by: FAMILY MEDICINE

## 2024-04-02 PROCEDURE — 80061 LIPID PANEL: CPT | Performed by: FAMILY MEDICINE

## 2024-04-02 PROCEDURE — 82306 VITAMIN D 25 HYDROXY: CPT | Performed by: FAMILY MEDICINE

## 2024-04-02 PROCEDURE — 85027 COMPLETE CBC AUTOMATED: CPT | Performed by: FAMILY MEDICINE

## 2024-04-02 PROCEDURE — 80053 COMPREHEN METABOLIC PANEL: CPT | Performed by: FAMILY MEDICINE

## 2024-04-02 PROCEDURE — 82570 ASSAY OF URINE CREATININE: CPT | Performed by: FAMILY MEDICINE

## 2024-04-18 ENCOUNTER — NURSE TRIAGE (OUTPATIENT)
Dept: CARDIOLOGY | Facility: CLINIC | Age: 74
End: 2024-04-18
Payer: COMMERCIAL

## 2024-04-18 NOTE — TELEPHONE ENCOUNTER
Bala called because he wanted to make an appointment with Dr. Grande. He didn't have any availability until fall so he was transferred to triage for his symptoms. He said last night at [employer] he got very lightheaded and SOB. It felt like he couldn't get full breaths. It went away after about 10 minutes. This morning he was fertilizing his lawn and he again got very SOB and lightheaded. He said the last time he felt like this was right before he had open-heart surgery. He does not have a BP cuff. He sees an [facility] PCP so I can not see his medication list. He has no Cardiac meds on his medication list for [place]. He said if you say I don't have any heart meds on my list I guess I don't. He is feeling ok now. I did tell him since his symptoms are exactly the same as before his heart surgery I want him to go either to  or an ER. He said it is just down the street.     Reason for Disposition   Patient wants to be seen   MILD dizziness (e.g., walking normally) and has NOT been evaluated by physician for this (Exception: Dizziness caused by heat exposure, sudden standing, or poor fluid intake.)    Additional Information   Negative: SEVERE difficulty breathing (e.g., struggling for each breath, speaks in single words)   Negative: Shock suspected (e.g., cold/pale/clammy skin, too weak to stand, low BP, rapid pulse)   Negative: Difficult to awaken or acting confused (e.g., disoriented, slurred speech)   Negative: Fainted, and still feels dizzy afterwards   Negative: Overdose (accidental or intentional) of medications   Negative: New neurologic deficit that is present now: * Weakness of the face, arm, or leg on one side of the body * Numbness of the face, arm, or leg on one side of the body * Loss of speech or garbled speech   Negative: Heart beating < 50 beats per minute OR > 140 beats per minute   Negative: Sounds like a life-threatening emergency to the triager   Negative: Chest pain    Negative: Rectal bleeding, bloody stool, or tarry-black stool   Negative: Vomiting is main symptom   Negative: Diarrhea is main symptom   Negative: Headache is main symptom   Negative: Heat exhaustion suspected (i.e., dehydration from heat exposure)   Negative: Patient states that they are having an anxiety or panic attack   Negative: Dizziness from low blood sugar (i.e., < 60 mg/dl or 3.5 mmol/l)   Negative: SEVERE dizziness (e.g., unable to stand, requires support to walk, feels like passing out now)   Negative: SEVERE headache or neck pain   Negative: Spinning or tilting sensation (vertigo) present now and one or more stroke risk factors (i.e., hypertension, diabetes mellitus, prior stroke/TIA, heart attack, age over 60) (Exception: Prior physician evaluation for this AND no different/worse than usual.)   Negative: Neurologic deficit that was brief (now gone), ANY of the following:* Weakness of the face, arm, or leg on one side of the body* Numbness of the face, arm, or leg on one side of the body* Loss of speech or garbled speech   Negative: Loss of vision or double vision  (Exception: Similar to previous migraines.)   Negative: Extra heartbeats, irregular heart beating, or heart is beating very fast (i.e., 'palpitations')   Negative: Difficulty breathing   Negative: Drinking very little and dehydration suspected (e.g., no urine > 12 hours, very dry mouth, very lightheaded)   Negative: Follows bleeding (e.g., stomach, rectum, vagina)  (Exception: Became dizzy from sight of small amount blood.)   Negative: Patient sounds very sick or weak to the triager   Negative: Lightheadedness (dizziness) present now, after 2 hours of rest and fluids   Negative: Spinning or tilting sensation (vertigo) present now   Negative: Fever > 103 F (39.4 C)   Negative: Fever > 100.0 F (37.8 C) and has diabetes mellitus or a weak immune system (e.g., HIV positive, cancer chemotherapy, organ transplant, splenectomy, chronic steroids)    "Negative: MODERATE dizziness (e.g., interferes with normal activities)  (Exception: Dizziness caused by heat exposure, sudden standing, or poor fluid intake.)   Negative: Vomiting occurs with dizziness   Negative: Taking a medicine that could cause dizziness (e.g., blood pressure medications, diuretics)   Negative: Substance use (drug use) or unhealthy alcohol use, known or suspected    Answer Assessment - Initial Assessment Questions  1. DESCRIPTION: \"Describe your dizziness.\"      Lightheadedness with SOB.  2. LIGHTHEADED: \"Do you feel lightheaded?\" (e.g., somewhat faint, woozy, weak upon standing)      yes  3. VERTIGO: \"Do you feel like either you or the room is spinning or tilting?\" (i.e. vertigo)      no  4. SEVERITY: \"How bad is it?\"  \"Do you feel like you are going to faint?\" \"Can you stand and walk?\"    - MILD: Feels slightly dizzy, but walking normally.    - MODERATE: Feels unsteady when walking, but not falling; interferes with normal activities (e.g., school, work).    - SEVERE: Unable to walk without falling, or requires assistance to walk without falling; feels like passing out now.       Mild-moderate  5. ONSET:  \"When did the dizziness begin?\"      Got lightheaded last night at [employer] practice and this morning when he was fertilizing his lawn.  6. AGGRAVATING FACTORS: \"Does anything make it worse?\" (e.g., standing, change in head position)      Exertion  7. HEART RATE: \"Can you tell me your heart rate?\" \"How many beats in 15 seconds?\"  (Note: not all patients can do this)        He doesn't know  8. CAUSE: \"What do you think is causing the dizziness?\"      Last time he had lightheadedness and SOB was right before he had open-heart surgery.  9. RECURRENT SYMPTOM: \"Have you had dizziness before?\" If Yes, ask: \"When was the last time?\" \"What happened that time?\"      See above  10. OTHER SYMPTOMS: \"Do you have any other symptoms?\" (e.g., fever, chest pain, vomiting, diarrhea, bleeding)        no  11. " "PREGNANCY: \"Is there any chance you are pregnant?\" \"When was your last menstrual period?\"        N/A    Protocols used: Dizziness-A-OH    "

## 2024-04-22 ENCOUNTER — MEDICAL CORRESPONDENCE (OUTPATIENT)
Dept: HEALTH INFORMATION MANAGEMENT | Facility: CLINIC | Age: 74
End: 2024-04-22
Payer: COMMERCIAL

## 2024-04-24 ENCOUNTER — TRANSCRIBE ORDERS (OUTPATIENT)
Dept: OTHER | Age: 74
End: 2024-04-24

## 2024-04-24 DIAGNOSIS — R06.02 SOB (SHORTNESS OF BREATH): Primary | ICD-10-CM

## 2024-05-03 ENCOUNTER — OFFICE VISIT (OUTPATIENT)
Dept: PULMONOLOGY | Facility: CLINIC | Age: 74
End: 2024-05-03
Payer: COMMERCIAL

## 2024-05-03 DIAGNOSIS — R06.02 SOB (SHORTNESS OF BREATH): ICD-10-CM

## 2024-05-03 DIAGNOSIS — R06.02 SOB (SHORTNESS OF BREATH): Primary | ICD-10-CM

## 2024-05-03 LAB — HGB BLD-MCNC: 13.1 G/DL

## 2024-05-03 PROCEDURE — 94729 DIFFUSING CAPACITY: CPT | Mod: 26 | Performed by: INTERNAL MEDICINE

## 2024-05-03 PROCEDURE — 94375 RESPIRATORY FLOW VOLUME LOOP: CPT | Mod: 26 | Performed by: INTERNAL MEDICINE

## 2024-05-03 PROCEDURE — 85018 HEMOGLOBIN: CPT | Mod: QW | Performed by: INTERNAL MEDICINE

## 2024-05-03 PROCEDURE — 94726 PLETHYSMOGRAPHY LUNG VOLUMES: CPT | Mod: 26 | Performed by: INTERNAL MEDICINE

## 2024-05-06 LAB
DLCOCOR-%PRED-PRE: 78 %
DLCOCOR-PRE: 21.74 ML/MIN/MMHG
DLCOUNC-%PRED-PRE: 75 %
DLCOUNC-PRE: 20.76 ML/MIN/MMHG
DLCOUNC-PRED: 27.56 ML/MIN/MMHG
ERV-%PRED-PRE: 85 %
ERV-PRE: 1.36 L
ERV-PRED: 1.59 L
EXPTIME-PRE: 9.15 SEC
FEF2575-%PRED-PRE: 104 %
FEF2575-PRE: 2.43 L/SEC
FEF2575-PRED: 2.33 L/SEC
FEFMAX-%PRED-PRE: 79 %
FEFMAX-PRE: 6.89 L/SEC
FEFMAX-PRED: 8.68 L/SEC
FEV1-%PRED-PRE: 113 %
FEV1-PRE: 3.59 L
FEV1FEV6-PRE: 75 %
FEV1FEV6-PRED: 77 %
FEV1FVC-PRE: 71 %
FEV1FVC-PRED: 76 %
FEV1SVC-PRE: 74 %
FEV1SVC-PRED: 71 %
FIFMAX-PRE: 6.15 L/SEC
FRCPLETH-%PRED-PRE: 110 %
FRCPLETH-PRE: 4.34 L
FRCPLETH-PRED: 3.91 L
FVC-%PRED-PRE: 119 %
FVC-PRE: 5.02 L
FVC-PRED: 4.19 L
IC-%PRED-PRE: 109 %
IC-PRE: 3.49 L
IC-PRED: 3.17 L
RVPLETH-%PRED-PRE: 105 %
RVPLETH-PRE: 2.98 L
RVPLETH-PRED: 2.82 L
TLCPLETH-%PRED-PRE: 101 %
TLCPLETH-PRE: 7.83 L
TLCPLETH-PRED: 7.74 L
VA-%PRED-PRE: 94 %
VA-PRE: 6.7 L
VC-%PRED-PRE: 109 %
VC-PRE: 4.84 L
VC-PRED: 4.42 L

## 2024-06-20 ENCOUNTER — OFFICE VISIT (OUTPATIENT)
Dept: PULMONOLOGY | Facility: CLINIC | Age: 74
End: 2024-06-20
Payer: COMMERCIAL

## 2024-06-20 VITALS
DIASTOLIC BLOOD PRESSURE: 69 MMHG | OXYGEN SATURATION: 97 % | SYSTOLIC BLOOD PRESSURE: 112 MMHG | HEIGHT: 73 IN | HEART RATE: 81 BPM | WEIGHT: 202.5 LBS | BODY MASS INDEX: 26.84 KG/M2

## 2024-06-20 DIAGNOSIS — I25.10 CORONARY ARTERY DISEASE WITHOUT ANGINA PECTORIS, UNSPECIFIED VESSEL OR LESION TYPE, UNSPECIFIED WHETHER NATIVE OR TRANSPLANTED HEART: ICD-10-CM

## 2024-06-20 DIAGNOSIS — R06.09 DOE (DYSPNEA ON EXERTION): Primary | ICD-10-CM

## 2024-06-20 DIAGNOSIS — R06.02 SOB (SHORTNESS OF BREATH): ICD-10-CM

## 2024-06-20 PROCEDURE — 99203 OFFICE O/P NEW LOW 30 MIN: CPT | Performed by: INTERNAL MEDICINE

## 2024-06-20 NOTE — LETTER
6/20/2024      Bala Hopper  [address] [place] [address]      Dear Colleague,    Thank you for referring your patient, Bala Hopper, to the [facility]. Please see a copy of my visit note below.    Pulmonary Clinic Outpatient Consultation    Assessment and Plan:   73M with a history of CAD s/p CABG in 2022, DANIELLE on CPAP, DM, HLD, among others, presents for evaluation of worsening dyspnea on exertion. PFTs were completely normal. Prior chest imaging did not show any evidence of parenhcymal lung disease. Lung exam and SpO2 are normal. No evidence of hypervolemia on exam. Last echo did show RV pressure overload, dilated RV with decreased RV systolic function (no TAPSE reported on that study) which may have been due to recent post-op state and hospitalization for CHF, but we should definitely repeat the echocardiogram to see if there has been any worsening in right-sided heart function, for which he is at risk given his history. Otherwise he appears to be in excellent health and I reassured him that his lung function is completely normal.    I did recommend he re-establish care with Dr. Grande given his history. Referral was placed today.   Vaccination status not addressed today.  I will notify him about the results of the echocardiogram.  Offered scheduled follow up but he would like to follow up with me on an as needed basis, which is reasonable.   All questions answered.    Andrés Tai MD (Avi)  [facility] ([facility])  Clinic [phone]  Fax [phone]     CCx: shortness of breath    HPI:  73M with a history of CAD s/p CABG in 2022, DANIELLE on CPAP, DM, HLD, among others, presents for evaluation of SOB. He was referred by his PMD, Dr. Hurley. He notes reduced exercise tolerance. He goes to the gym and uses the pool 3 times per week. He can climb a flight of stairs. He has noticed diminished exercise tolerance over the last  6-12 months. He does notice he gets a bit more tired easily with exercise, but can still complete the exercise. He doesn't ride his bike anymore.  No coughing, wheezing, chest pain, chest pressure, fevers or chills.   He is a never smoker.  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   Last seen by Dr. Grande (cardiology) in Oct 2022 and told to follow up in 6 months but never did.     ROS:  A 12-system review was obtained and was negative with the exception of the symptoms endorsed in the history of present illness.    PMH:  Past Medical History:   Diagnosis Date     BPH (benign prostatic hyperplasia)      Cerebral infarction (H)      Coronary artery disease      Depression      Diabetes (H)      Diabetes mellitus (H)      Hyperlipidemia LDL goal <100      DANIELLE (obstructive sleep apnea)      Tardive dyskinesia 2019     Thyroid disease         PSH:  Past Surgical History:   Procedure Laterality Date     BYPASS GRAFT ARTERY CORONARY N/A 9/2/2022    Procedure: CORONARY ARTERY BYPASS GRAFT (CABG)X2 LEFT LEG ENDOSCOPIC SAPHENOUS VESSEL PROCUREMENT, LEFT INTERNAL MAMMARY ARTERY HARVEST, ANESTHESIA TRANSESOPHAGEAL ECHOCARDIOGRAM;EPI AORTIC ULTRASOUND;  Surgeon: Carline Bradshaw MD;  Location: [facility] OR     COLONOSCOPY N/A 1/2/2024    Procedure: COLONOSCOPY WITH POLYPECTOMY;  Surgeon: Tonie Lawson MD;  Location: [facility] OR     CV CORONARY ANGIOGRAM N/A 8/10/2022    Procedure: Coronary Angiogram;  Surgeon: Annette Leavitt MD;  Location: [facility] CATH LAB CV     CV LEFT HEART CATH N/A 8/10/2022    Procedure: Left Heart Catheterization;  Surgeon: Annette Leavitt MD;  Location: [facility] CATH LAB CV     tendon right wrist Right        Allergies:  No Known Allergies    Family HX:  No family history on file.    Social Hx:  Social History     Socioeconomic History     Marital status:      Spouse name: Not on file     Number of children: Not on file     Years  "of education: Not on file     Highest education level: Not on file   Occupational History     Not on file   Tobacco Use     Smoking status: Never     Passive exposure: Never     Smokeless tobacco: Never   Vaping Use     Vaping status: Never Used   Substance and Sexual Activity     Alcohol use: Not Currently     Drug use: Never     Sexual activity: Not on file   Other Topics Concern     Parent/sibling w/ CABG, MI or angioplasty before 65F 55M? Not Asked   Social History Narrative     Not on file     Social Determinants of Health     Financial Resource Strain: Not on file   Food Insecurity: Not on file   Transportation Needs: Not on file   Physical Activity: Not on file   Stress: Not on file   Social Connections: Not on file   Interpersonal Safety: Not on file   Housing Stability: Not on file       Current Meds:  Current Outpatient Medications   Medication Sig Dispense Refill     aspirin (ASA) 81 MG chewable tablet 2 tablets (162 mg) by Oral or NG Tube route daily 60 tablet 3     atorvastatin (LIPITOR) 40 MG tablet Take 40 mg by mouth daily       finasteride (PROSCAR) 5 MG tablet Take 5 mg by mouth daily       FLUoxetine (PROZAC) 20 MG capsule Take 60 mg by mouth daily       glipiZIDE (GLUCOTROL XL) 5 MG 24 hr tablet Take 5 mg by mouth 2 times daily (before meals)       lactobacillus rhamnosus (GG) (CULTURELL) capsule Take 1 capsule by mouth daily       levothyroxine (SYNTHROID/LEVOTHROID) 25 MCG tablet        metFORMIN (GLUCOPHAGE) 1000 MG tablet [METFORMIN (GLUCOPHAGE) 1000 MG TABLET] Take 1,000 mg by mouth 2 (two) times a day with meals.       tamsulosin (FLOMAX) 0.4 mg Cp24 Take 0.4 mg by mouth every evening       vitamin D3 (CHOLECALCIFEROL) 50 mcg (2000 units) tablet Take 2 tablets by mouth daily       Lancets (ONETOUCH DELICA PLUS [id number]) MISC See Admin Instructions         Physical Exam:  /69 (BP Location: Left arm, Patient Position: Sitting, Cuff Size: Adult Regular)   Pulse 81   Ht 1.854 m (6' 1\") "   Wt 91.9 kg (202 lb 8 oz)   SpO2 97%   BMI 26.72 kg/m    Gen: awake, alert, oriented, no distress  HEENT: nasal turbinates are unremarkable, no oropharyngeal lesions, no cervical or supraclavicular lymphadenopathy. No JVD noted.   CV: RRR, no M/G/R  Resp: CTAB, no focal crackles or wheezes  Skin: no apparent rashes  Ext: no cyanosis, clubbing or edema  Neuro: alert, nonfocal    Labs:  reviewed    Imaging studies:  Personally reviewed    EXAM: CT CHEST W/O CONTRAST  LOCATION: [facility]  DATE/TIME: 9/13/2022 5:13 PM     INDICATION: possible left sided pneumonia  COMPARISON: AP and lateral views of the chest 9/13/2022  TECHNIQUE: CT chest without IV contrast. Multiplanar reformats were obtained. Dose reduction techniques were used.  CONTRAST: None.     FINDINGS:   LUNGS AND PLEURA: Small to moderate left and small right pleural effusions are present. Pleural fluid layers dependently. There is passive bandlike atelectasis along the posterior pleura in the right lower lobe. Atelectasis of the left lower lobe is more   extensive and includes all basilar segments. There is also a band of atelectasis in the posterior left upper lobe along the major fissure. The central airways are patent including the segmental and proximal subsegmental airways in the left lower lobe.   The distal subsegmental airways of the left lower lobe are involved with atelectasis.     MEDIASTINUM: Status post recent median sternotomy and coronary revascularization. There is expected postsurgical fluid and a trace amount of air within the anterior mediastinum related to surgery. Margins of the sternal osteotomy are in good apposition.     Cardiac chambers are normal in size. There is no pericardial effusion. Normal caliber thoracic aorta. Imaged thyroid gland is normal. No enlarged mediastinal or hilar lymph nodes. The esophagus is decompressed.     CORONARY ARTERY CALCIFICATION: Previous intervention (stents or CABG).      UPPER ABDOMEN: No significant finding.     MUSCULOSKELETAL: No aggressive or destructive bone lesions.                                                                      IMPRESSION:   1.  Small to moderate left and small right pleural effusions are present with related atelectasis. There are no specific findings to suggest a superimposed pneumonia.  2.  Status post recent median sternotomy and coronary revascularization. There is expected fluid and air in the anterior mediastinum.    Echo Sept 2022  Interpretation Summary     1. The visual ejection fraction is estimated at 55%. The left ventricle is  normal in size. There is borderline concentric left ventricular hypertrophy.  Flattened septum is consistent with RV pressure overload.  2. The right ventricle is mildly dilated. Mildly decreased right ventricular  systolic function  3. Small pericardial effusion. There are no echocardiographic indications of  cardiac tamponade. Normal RA pressure of 3 mmHg.    Pulmonary Function Testing  May 2024  FEV1 3.59L, 113%  %  No BD testing done  Ratio 0.71  TLC 7.83L, 101%  Dlco 78% liana for hgb  Flow volume curve appears normal.     Again, thank you for allowing me to participate in the care of your patient.        Sincerely,        Andrés Tai MD

## 2024-06-20 NOTE — PROGRESS NOTES
Pulmonary Clinic Outpatient Consultation    Assessment and Plan:   73M with a history of CAD s/p CABG in 2022, DANIELLE on CPAP, DM, HLD, among others, presents for evaluation of worsening dyspnea on exertion. PFTs were completely normal. Prior chest imaging did not show any evidence of parenhcymal lung disease. Lung exam and SpO2 are normal. No evidence of hypervolemia on exam. Last echo did show RV pressure overload, dilated RV with decreased RV systolic function (no TAPSE reported on that study) which may have been due to recent post-op state and hospitalization for CHF, but we should definitely repeat the echocardiogram to see if there has been any worsening in right-sided heart function, for which he is at risk given his history. Otherwise he appears to be in excellent health and I reassured him that his lung function is completely normal.    I did recommend he re-establish care with Dr. Grande given his history. Referral was placed today.   Vaccination status not addressed today.  I will notify him about the results of the echocardiogram.  Offered scheduled follow up but he would like to follow up with me on an as needed basis, which is reasonable.   All questions answered.    Andrés Tai MD (Avi)  [facility] ([facility])  Clinic [phone]  Fax [phone]     CCx: shortness of breath    HPI:  73M with a history of CAD s/p CABG in 2022, DANIELLE on CPAP, DM, HLD, among others, presents for evaluation of SOB. He was referred by his PMD, Dr. Hurley. He notes reduced exercise tolerance. He goes to the gym and uses the pool 3 times per week. He can climb a flight of stairs. He has noticed diminished exercise tolerance over the last 6-12 months. He does notice he gets a bit more tired easily with exercise, but can still complete the exercise. He doesn't ride his bike anymore.  No coughing, wheezing, chest pain, chest pressure, fevers or chills.   He is a never smoker.  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   Last seen by Dr. Grande (cardiology) in Oct 2022 and told to follow up in 6 months but never did.     ROS:  A 12-system review was obtained and was negative with the exception of the symptoms endorsed in the history of present illness.    PMH:  Past Medical History:   Diagnosis Date    BPH (benign prostatic hyperplasia)     Cerebral infarction (H)     Coronary artery disease     Depression     Diabetes (H)     Diabetes mellitus (H)     Hyperlipidemia LDL goal <100     DANIELLE (obstructive sleep apnea)     Tardive dyskinesia 2019    Thyroid disease         PSH:  Past Surgical History:   Procedure Laterality Date    BYPASS GRAFT ARTERY CORONARY N/A 9/2/2022    Procedure: CORONARY ARTERY BYPASS GRAFT (CABG)X2 LEFT LEG ENDOSCOPIC SAPHENOUS VESSEL PROCUREMENT, LEFT INTERNAL MAMMARY ARTERY HARVEST, ANESTHESIA TRANSESOPHAGEAL ECHOCARDIOGRAM;EPI AORTIC ULTRASOUND;  Surgeon: Carline Bradshaw MD;  Location: [facility] OR    COLONOSCOPY N/A 1/2/2024    Procedure: COLONOSCOPY WITH POLYPECTOMY;  Surgeon: Tonie Lawson MD;  Location: [facility] OR    CV CORONARY ANGIOGRAM N/A 8/10/2022    Procedure: Coronary Angiogram;  Surgeon: Annette Leavitt MD;  Location: [facility] CATH LAB CV    CV LEFT HEART CATH N/A 8/10/2022    Procedure: Left Heart Catheterization;  Surgeon: Annette Leavitt MD;  Location: [facility] CATH LAB CV    tendon right wrist Right        Allergies:  No Known Allergies    Family HX:  No family history on file.    Social Hx:  Social History     Socioeconomic History    Marital status:      Spouse name: Not on file    Number of children: Not on file    Years of education: Not on file    Highest education level: Not on file   Occupational History    Not on file   Tobacco Use    Smoking status: Never     Passive exposure: Never    Smokeless tobacco: Never   Vaping Use    Vaping status: Never Used   Substance and Sexual  "Activity    Alcohol use: Not Currently    Drug use: Never    Sexual activity: Not on file   Other Topics Concern    Parent/sibling w/ CABG, MI or angioplasty before 65F 55M? Not Asked   Social History Narrative    Not on file     Social Determinants of Health     Financial Resource Strain: Not on file   Food Insecurity: Not on file   Transportation Needs: Not on file   Physical Activity: Not on file   Stress: Not on file   Social Connections: Not on file   Interpersonal Safety: Not on file   Housing Stability: Not on file       Current Meds:  Current Outpatient Medications   Medication Sig Dispense Refill    aspirin (ASA) 81 MG chewable tablet 2 tablets (162 mg) by Oral or NG Tube route daily 60 tablet 3    atorvastatin (LIPITOR) 40 MG tablet Take 40 mg by mouth daily      finasteride (PROSCAR) 5 MG tablet Take 5 mg by mouth daily      FLUoxetine (PROZAC) 20 MG capsule Take 60 mg by mouth daily      glipiZIDE (GLUCOTROL XL) 5 MG 24 hr tablet Take 5 mg by mouth 2 times daily (before meals)      lactobacillus rhamnosus (GG) (CULTURELL) capsule Take 1 capsule by mouth daily      levothyroxine (SYNTHROID/LEVOTHROID) 25 MCG tablet       metFORMIN (GLUCOPHAGE) 1000 MG tablet [METFORMIN (GLUCOPHAGE) 1000 MG TABLET] Take 1,000 mg by mouth 2 (two) times a day with meals.      tamsulosin (FLOMAX) 0.4 mg Cp24 Take 0.4 mg by mouth every evening      vitamin D3 (CHOLECALCIFEROL) 50 mcg (2000 units) tablet Take 2 tablets by mouth daily      Lancets (ONETOUCH DELICA PLUS [id number]) MISC See Admin Instructions         Physical Exam:  /69 (BP Location: Left arm, Patient Position: Sitting, Cuff Size: Adult Regular)   Pulse 81   Ht 1.854 m (6' 1\")   Wt 91.9 kg (202 lb 8 oz)   SpO2 97%   BMI 26.72 kg/m    Gen: awake, alert, oriented, no distress  HEENT: nasal turbinates are unremarkable, no oropharyngeal lesions, no cervical or supraclavicular lymphadenopathy. No JVD noted.   CV: RRR, no M/G/R  Resp: CTAB, no focal crackles " or wheezes  Skin: no apparent rashes  Ext: no cyanosis, clubbing or edema  Neuro: alert, nonfocal    Labs:  reviewed    Imaging studies:  Personally reviewed    EXAM: CT CHEST W/O CONTRAST  LOCATION: [facility]  DATE/TIME: 9/13/2022 5:13 PM     INDICATION: possible left sided pneumonia  COMPARISON: AP and lateral views of the chest 9/13/2022  TECHNIQUE: CT chest without IV contrast. Multiplanar reformats were obtained. Dose reduction techniques were used.  CONTRAST: None.     FINDINGS:   LUNGS AND PLEURA: Small to moderate left and small right pleural effusions are present. Pleural fluid layers dependently. There is passive bandlike atelectasis along the posterior pleura in the right lower lobe. Atelectasis of the left lower lobe is more   extensive and includes all basilar segments. There is also a band of atelectasis in the posterior left upper lobe along the major fissure. The central airways are patent including the segmental and proximal subsegmental airways in the left lower lobe.   The distal subsegmental airways of the left lower lobe are involved with atelectasis.     MEDIASTINUM: Status post recent median sternotomy and coronary revascularization. There is expected postsurgical fluid and a trace amount of air within the anterior mediastinum related to surgery. Margins of the sternal osteotomy are in good apposition.     Cardiac chambers are normal in size. There is no pericardial effusion. Normal caliber thoracic aorta. Imaged thyroid gland is normal. No enlarged mediastinal or hilar lymph nodes. The esophagus is decompressed.     CORONARY ARTERY CALCIFICATION: Previous intervention (stents or CABG).     UPPER ABDOMEN: No significant finding.     MUSCULOSKELETAL: No aggressive or destructive bone lesions.                                                                      IMPRESSION:   1.  Small to moderate left and small right pleural effusions are present with related  atelectasis. There are no specific findings to suggest a superimposed pneumonia.  2.  Status post recent median sternotomy and coronary revascularization. There is expected fluid and air in the anterior mediastinum.    Echo Sept 2022  Interpretation Summary     1. The visual ejection fraction is estimated at 55%. The left ventricle is  normal in size. There is borderline concentric left ventricular hypertrophy.  Flattened septum is consistent with RV pressure overload.  2. The right ventricle is mildly dilated. Mildly decreased right ventricular  systolic function  3. Small pericardial effusion. There are no echocardiographic indications of  cardiac tamponade. Normal RA pressure of 3 mmHg.    Pulmonary Function Testing  May 2024  FEV1 3.59L, 113%  %  No BD testing done  Ratio 0.71  TLC 7.83L, 101%  Dlco 78% liana for hgb  Flow volume curve appears normal.

## 2024-07-11 ENCOUNTER — HOSPITAL ENCOUNTER (OUTPATIENT)
Dept: CARDIOLOGY | Facility: HOSPITAL | Age: 74
Discharge: HOME | End: 2024-07-11
Attending: INTERNAL MEDICINE | Admitting: INTERNAL MEDICINE
Payer: COMMERCIAL

## 2024-07-11 DIAGNOSIS — R06.09 DOE (DYSPNEA ON EXERTION): ICD-10-CM

## 2024-07-11 LAB — LVEF ECHO: NORMAL

## 2024-07-11 PROCEDURE — C8929 TTE W OR WO FOL WCON,DOPPLER: HCPCS

## 2024-07-11 PROCEDURE — 999N000208 ECHOCARDIOGRAM COMPLETE

## 2024-07-11 PROCEDURE — 255N000002 HC RX 255 OP 636: Performed by: INTERNAL MEDICINE

## 2024-07-11 PROCEDURE — 93306 TTE W/DOPPLER COMPLETE: CPT | Mod: 26 | Performed by: INTERNAL MEDICINE

## 2024-07-11 RX ADMIN — PERFLUTREN 3 ML: 6.52 INJECTION, SUSPENSION INTRAVENOUS at 08:40

## 2024-07-15 NOTE — RESULT ENCOUNTER NOTE
Informed Bala of unremarkable echocardiogram via mychart. LA is mildly dilated. He has follow scheduled with cardiology.    Andrés Tai MD (Avi)  [facility] ([facility])  Clinic [phone]  Fax [phone]

## 2024-07-21 ENCOUNTER — HEALTH MAINTENANCE LETTER (OUTPATIENT)
Age: 74
End: 2024-07-21

## 2024-09-30 ENCOUNTER — OFFICE VISIT (OUTPATIENT)
Dept: CARDIOLOGY | Facility: CLINIC | Age: 74
End: 2024-09-30
Payer: COMMERCIAL

## 2024-09-30 VITALS
OXYGEN SATURATION: 97 % | BODY MASS INDEX: 27.05 KG/M2 | HEART RATE: 80 BPM | DIASTOLIC BLOOD PRESSURE: 62 MMHG | WEIGHT: 205 LBS | RESPIRATION RATE: 16 BRPM | SYSTOLIC BLOOD PRESSURE: 110 MMHG

## 2024-09-30 DIAGNOSIS — I25.10 CORONARY ARTERY DISEASE WITHOUT ANGINA PECTORIS, UNSPECIFIED VESSEL OR LESION TYPE, UNSPECIFIED WHETHER NATIVE OR TRANSPLANTED HEART: ICD-10-CM

## 2024-09-30 DIAGNOSIS — R06.09 DOE (DYSPNEA ON EXERTION): ICD-10-CM

## 2024-09-30 DIAGNOSIS — E78.5 DYSLIPIDEMIA: Primary | ICD-10-CM

## 2024-09-30 PROCEDURE — 99214 OFFICE O/P EST MOD 30 MIN: CPT | Performed by: INTERNAL MEDICINE

## 2024-09-30 NOTE — LETTER
9/30/2024    Christiano Hurley MD  [address] [place]  [place] [address]    RE: Bala Hopper       Dear Colleague,     I had the pleasure of seeing Bala Hopper in the [facility].         [facility] [address], [place] [address]   [email or web address]   OFFICE: [phone]            Impression and Plan       1.  Coronary artery disease.  Bala has known coronary artery disease.  Specifically, he underwent two-vessel coronary artery bypass graft surgery 2 September 2022 with ARRON graft to the LAD and saphenous vein graft to the diagonal branch.     He denies any anginal chest pain or other symptoms concerning for angina.     2.   Dyslipidemia.  Lipid profile to April 2024 revealed LDL 68 mg/dL and HDL 37 mg/dL.  Continue atorvastatin 40 mg daily.     4.  Obstructive sleep apnea.  Bala does have a history of obstructive sleep apnea and uses CPAP.     Plan on follow-up in 1 year.    35 minutes spent reviewing prior records (including documentation, laboratory studies, cardiac testing/imaging), interview with patient along with physical exam, planning, and subsequent documentation/crafting of note).           History of Present Illness    Once again I would like to thank you again for asking me to participate in the care of your patient, Bala Hopper.  As you know, but to reiterate for my own records, Bala Hopper is a 73 year old male with coronary artery disease.  Specifically, Bala underwent two-vessel coronary artery bypass graft surgery 2 September 2022 with ARRON graft to the LAD and saphenous vein graft to the diagonal branch.     On further interview, Bala denies any anginal type chest pain.  He is breathing has been comfortable.      Further review of systems is otherwise negative/noncontributory (medical record and 13 point review of systems reviewed as well and pertinent positives noted).          Cardiac Diagnostics      Echocardiogram 11 July 2024:  Normal left ventricular size and systolic performance with ejection fraction of 60-65%.  No significant valvular heart disease.  Normal right ventricular size and systolic performance.  Mild left atrial enlargement.  Right atrium normal dimension.     Echocardiogram 14 September 2022:  Limited echocardiographic study.  Normal left ventricular size and systolic performance with ejection fraction of 55%.  Abnormal septal motion consistent with right ventricular volume/pressure overload.  Mild right ventricular enlargement with mildly reduced right ventricular systolic performance.  Small pericardial effusion without evidence of interventricular dependence.    Echocardiogram 24 August 2022:  Normal left ventricular size and systolic performance with ejection fraction of 55 to 60%.  Mild increase in left ventricular wall thickness.  No significant valvular heart disease.  Normal right ventricular size and systolic performance.  Normal atrial dimensions.    Regadenoson MRI 27 July 2022 (precoronary artery bypass graft surgery):  Pharmacological Regadenoson stress cardiac MRI is positive for a small area of inducible myocardial ischemia in the [id number]-ra-dhjwko anteroseptal wall. This is in the territory of the left anterior descending artery.  No evidence of prior myocardial infarction, nonvascular scarring or fibrosis, or infiltrative disease.  Normal left ventricular size and wall thickness. Systolic function is low normal. The quantified left ventricular ejection fraction is 51%.   Normal right ventricular size and systolic function.  The quantified right ventricular ejection fraction is 53%.   No significant valvular abnormalities.    Coronary angiogram 10 August 2022 (precoronary artery bypass graft surgery):  Left main coronary artery: 20% stenosis.  Left anterior descending coronary artery: Mid vessel 100% stenosis with distal vessel filling via collaterals  from second diagonal and second septal .  Distal LAD also filling via collaterals from PDA.  Circumflex coronary artery: 20% ostial stenosis.  50% mid vessel stenosis.  Right coronary artery: 25% PDA stenosis.     Twelve-lead ECG (personally reviewed) 5 April 2022: Sinus rhythm.  Normal ECG.            Physical Examination       /62 (BP Location: Left arm, Patient Position: Sitting, Cuff Size: Adult Regular)   Pulse 80   Resp 16   Wt 93 kg (205 lb)   SpO2 97%   BMI 27.05 kg/m          Wt Readings from Last 3 Encounters:   09/30/24 93 kg (205 lb)   06/20/24 91.9 kg (202 lb 8 oz)   01/02/24 91.6 kg (202 lb)       The patient is alert and oriented times three. Sclerae are anicteric. Mucosal membranes are moist. Jugular venous pressure is normal. No significant adenopathy/thyromegally appreciated. Lungs are clear with good expansion. On cardiovascular exam, the patient has a regular S1 and S2. Abdomen is soft and non-tender. Extremities reveal no clubbing, cyanosis, or edema.         Family History/Social History/Risk Factors   Patient does not smoke.  Family history reviewed.         Medical History  Surgical History Family History Social History   Past Medical History:   Diagnosis Date     BPH (benign prostatic hyperplasia)      Cerebral infarction (H)      Coronary artery disease      Depression      Diabetes (H)      Diabetes mellitus (H)      Hyperlipidemia LDL goal <100      DANIELLE (obstructive sleep apnea)      Tardive dyskinesia 2019     Thyroid disease      Past Surgical History:   Procedure Laterality Date     BYPASS GRAFT ARTERY CORONARY N/A 9/2/2022    Procedure: CORONARY ARTERY BYPASS GRAFT (CABG)X2 LEFT LEG ENDOSCOPIC SAPHENOUS VESSEL PROCUREMENT, LEFT INTERNAL MAMMARY ARTERY HARVEST, ANESTHESIA TRANSESOPHAGEAL ECHOCARDIOGRAM;EPI AORTIC ULTRASOUND;  Surgeon: Carline Bradshaw MD;  Location: [facility] OR     COLONOSCOPY N/A 1/2/2024    Procedure: COLONOSCOPY WITH POLYPECTOMY;   Surgeon: Tonie Lawson MD;  Location: [facility]     CV CORONARY ANGIOGRAM N/A 8/10/2022    Procedure: Coronary Angiogram;  Surgeon: Annette Leavitt MD;  Location: [facility] CATH LAB CV     CV LEFT HEART CATH N/A 8/10/2022    Procedure: Left Heart Catheterization;  Surgeon: Annette Leavitt MD;  Location: [facility] CATH LAB CV     tendon right wrist Right      No family history on file.     Social History     Socioeconomic History     Marital status:      Spouse name: Not on file     Number of children: Not on file     Years of education: Not on file     Highest education level: Not on file   Occupational History     Not on file   Tobacco Use     Smoking status: Never     Passive exposure: Never     Smokeless tobacco: Never   Vaping Use     Vaping status: Never Used   Substance and Sexual Activity     Alcohol use: Not Currently     Drug use: Never     Sexual activity: Not on file   Other Topics Concern     Parent/sibling w/ CABG, MI or angioplasty before 65F 55M? Not Asked   Social History Narrative     Not on file     Social Determinants of Health     Financial Resource Strain: Not on file   Food Insecurity: Not on file   Transportation Needs: Not on file   Physical Activity: Not on file   Stress: Not on file   Social Connections: Not on file   Interpersonal Safety: Not on file   Housing Stability: Not on file           Medications  Allergies   Current Outpatient Medications   Medication Sig Dispense Refill     aspirin (ASA) 81 MG chewable tablet 2 tablets (162 mg) by Oral or NG Tube route daily 60 tablet 3     atorvastatin (LIPITOR) 40 MG tablet Take 40 mg by mouth daily       finasteride (PROSCAR) 5 MG tablet Take 5 mg by mouth daily       FLUoxetine (PROZAC) 20 MG capsule Take 60 mg by mouth daily       glipiZIDE (GLUCOTROL XL) 5 MG 24 hr tablet Take 5 mg by mouth 2 times daily (before meals)       lactobacillus rhamnosus (GG) (CULTURELL) capsule Take 1 capsule by mouth daily        levothyroxine (SYNTHROID/LEVOTHROID) 25 MCG tablet Take 25 mcg by mouth daily.       metFORMIN (GLUCOPHAGE) 1000 MG tablet [METFORMIN (GLUCOPHAGE) 1000 MG TABLET] Take 1,000 mg by mouth 2 (two) times a day with meals.       tamsulosin (FLOMAX) 0.4 mg Cp24 Take 0.4 mg by mouth every evening       vitamin D3 (CHOLECALCIFEROL) 50 mcg (2000 units) tablet Take 2 tablets by mouth daily       Lancets (ONETOUCH DELICA PLUS [id number]) MISC See Admin Instructions       No Known Allergies       Lab Results    Chemistry/lipid CBC Cardiac Enzymes/BNP/TSH/INR   Recent Labs   Lab Test 04/02/24  0904   CHOL 123   HDL 37*   LDL 68   TRIG 91     Recent Labs   Lab Test 04/02/24  0904 05/11/23  1630 08/10/22  0710   LDL 68 67 69     Recent Labs   Lab Test 04/02/24  0904      POTASSIUM 4.1   CHLORIDE 104   CO2 25   *   BUN 16.4   CR 0.92   GFRESTIMATED 88   EVELYNE 9.0     Recent Labs   Lab Test 04/02/24  0904 12/08/23  1221 05/11/23  1630   CR 0.92 0.88 0.89     Recent Labs   Lab Test 08/26/22  0905   A1C 6.1*          Recent Labs   Lab Test 05/03/24  1606 04/02/24  0904   WBC  --  6.3   HGB 13.1 13.0*   HCT  --  37.9*   MCV  --  94   PLT  --  372     Recent Labs   Lab Test 05/03/24  1606 04/02/24  0904 09/16/22  0423   HGB 13.1 13.0* 10.5*    Recent Labs   Lab Test 09/14/22  0507 09/13/22  2334 09/13/22  1243   TROPONINI 0.03 0.02 0.03     Recent Labs   Lab Test 09/13/22  1243   *     Recent Labs   Lab Test 04/02/24  0904   TSH 3.33     Recent Labs   Lab Test 09/13/22  1243 09/02/22  1200 09/02/22  1113   INR 1.01 1.37* 1.43*          Medications  Allergies   Current Outpatient Medications   Medication Sig Dispense Refill     aspirin (ASA) 81 MG chewable tablet 2 tablets (162 mg) by Oral or NG Tube route daily 60 tablet 3     atorvastatin (LIPITOR) 40 MG tablet Take 40 mg by mouth daily       finasteride (PROSCAR) 5 MG tablet Take 5 mg by mouth daily       FLUoxetine (PROZAC) 20 MG capsule Take 60 mg by mouth daily        glipiZIDE (GLUCOTROL XL) 5 MG 24 hr tablet Take 5 mg by mouth 2 times daily (before meals)       lactobacillus rhamnosus (GG) (CULTURELL) capsule Take 1 capsule by mouth daily       levothyroxine (SYNTHROID/LEVOTHROID) 25 MCG tablet Take 25 mcg by mouth daily.       metFORMIN (GLUCOPHAGE) 1000 MG tablet [METFORMIN (GLUCOPHAGE) 1000 MG TABLET] Take 1,000 mg by mouth 2 (two) times a day with meals.       tamsulosin (FLOMAX) 0.4 mg Cp24 Take 0.4 mg by mouth every evening       vitamin D3 (CHOLECALCIFEROL) 50 mcg (2000 units) tablet Take 2 tablets by mouth daily       Lancets (ONETOUCH DELICA PLUS [id number]) MISC See Admin Instructions        No Known Allergies       Lab Results   Lab Results   Component Value Date     04/02/2024    CO2 25 04/02/2024    CO2 23 09/16/2022    BUN 16.4 04/02/2024    BUN 16 09/16/2022     Lab Results   Component Value Date    WBC 6.3 04/02/2024    HGB 13.1 05/03/2024    HGB 13.0 04/02/2024    HCT 37.9 04/02/2024    MCV 94 04/02/2024     04/02/2024     Lab Results   Component Value Date    CHOL 123 04/02/2024    TRIG 91 04/02/2024    HDL 37 04/02/2024     Lab Results   Component Value Date    INR 1.01 09/13/2022     Lab Results   Component Value Date     09/13/2022     Lab Results   Component Value Date    TROPONINI 0.03 09/14/2022    TROPONINI 0.02 09/13/2022    TROPONINI 0.03 09/13/2022     Lab Results   Component Value Date    TSH 3.33 04/02/2024    TSH 2.12 09/17/2021                      Thank you for allowing me to participate in the care of your patient.      Sincerely,     Sreedhar Grande MD     [facility]  cc:   Andrés Tai MD  [address]  [address]  [place] [address]

## 2024-09-30 NOTE — PROGRESS NOTES
[facility] [address], [place] [address]   [email or web address]   OFFICE: [phone]            Impression and Plan       1.  Coronary artery disease.  Bala has known coronary artery disease.  Specifically, he underwent two-vessel coronary artery bypass graft surgery 2 September 2022 with ARRON graft to the LAD and saphenous vein graft to the diagonal branch.     He denies any anginal chest pain or other symptoms concerning for angina.     2.   Dyslipidemia.  Lipid profile to April 2024 revealed LDL 68 mg/dL and HDL 37 mg/dL.  Continue atorvastatin 40 mg daily.     4.  Obstructive sleep apnea.  Bala does have a history of obstructive sleep apnea and uses CPAP.     Plan on follow-up in 1 year.    35 minutes spent reviewing prior records (including documentation, laboratory studies, cardiac testing/imaging), interview with patient along with physical exam, planning, and subsequent documentation/crafting of note).           History of Present Illness    Once again I would like to thank you again for asking me to participate in the care of your patient, Bala Hopper.  As you know, but to reiterate for my own records, Bala Hopper is a 73 year old male with coronary artery disease.  Specifically, Bala underwent two-vessel coronary artery bypass graft surgery 2 September 2022 with ARRON graft to the LAD and saphenous vein graft to the diagonal branch.     On further interview, Bala denies any anginal type chest pain.  He is breathing has been comfortable.      Further review of systems is otherwise negative/noncontributory (medical record and 13 point review of systems reviewed as well and pertinent positives noted).         Cardiac Diagnostics      Echocardiogram 11 July 2024:  Normal left ventricular size and systolic performance with ejection fraction of 60-65%.  No significant valvular heart disease.  Normal right ventricular size and  systolic performance.  Mild left atrial enlargement.  Right atrium normal dimension.     Echocardiogram 14 September 2022:  Limited echocardiographic study.  Normal left ventricular size and systolic performance with ejection fraction of 55%.  Abnormal septal motion consistent with right ventricular volume/pressure overload.  Mild right ventricular enlargement with mildly reduced right ventricular systolic performance.  Small pericardial effusion without evidence of interventricular dependence.    Echocardiogram 24 August 2022:  Normal left ventricular size and systolic performance with ejection fraction of 55 to 60%.  Mild increase in left ventricular wall thickness.  No significant valvular heart disease.  Normal right ventricular size and systolic performance.  Normal atrial dimensions.    Regadenoson MRI 27 July 2022 (precoronary artery bypass graft surgery):  Pharmacological Regadenoson stress cardiac MRI is positive for a small area of inducible myocardial ischemia in the [id number]-vt-swpjfg anteroseptal wall. This is in the territory of the left anterior descending artery.  No evidence of prior myocardial infarction, nonvascular scarring or fibrosis, or infiltrative disease.  Normal left ventricular size and wall thickness. Systolic function is low normal. The quantified left ventricular ejection fraction is 51%.   Normal right ventricular size and systolic function.  The quantified right ventricular ejection fraction is 53%.   No significant valvular abnormalities.    Coronary angiogram 10 August 2022 (precoronary artery bypass graft surgery):  Left main coronary artery: 20% stenosis.  Left anterior descending coronary artery: Mid vessel 100% stenosis with distal vessel filling via collaterals from second diagonal and second septal .  Distal LAD also filling via collaterals from PDA.  Circumflex coronary artery: 20% ostial stenosis.  50% mid vessel stenosis.  Right coronary artery: 25% PDA stenosis.      Twelve-lead ECG (personally reviewed) 5 April 2022: Sinus rhythm.  Normal ECG.            Physical Examination       /62 (BP Location: Left arm, Patient Position: Sitting, Cuff Size: Adult Regular)   Pulse 80   Resp 16   Wt 93 kg (205 lb)   SpO2 97%   BMI 27.05 kg/m          Wt Readings from Last 3 Encounters:   09/30/24 93 kg (205 lb)   06/20/24 91.9 kg (202 lb 8 oz)   01/02/24 91.6 kg (202 lb)       The patient is alert and oriented times three. Sclerae are anicteric. Mucosal membranes are moist. Jugular venous pressure is normal. No significant adenopathy/thyromegally appreciated. Lungs are clear with good expansion. On cardiovascular exam, the patient has a regular S1 and S2. Abdomen is soft and non-tender. Extremities reveal no clubbing, cyanosis, or edema.         Family History/Social History/Risk Factors   Patient does not smoke.  Family history reviewed.         Medical History  Surgical History Family History Social History   Past Medical History:   Diagnosis Date    BPH (benign prostatic hyperplasia)     Cerebral infarction (H)     Coronary artery disease     Depression     Diabetes (H)     Diabetes mellitus (H)     Hyperlipidemia LDL goal <100     DANIELLE (obstructive sleep apnea)     Tardive dyskinesia 2019    Thyroid disease      Past Surgical History:   Procedure Laterality Date    BYPASS GRAFT ARTERY CORONARY N/A 9/2/2022    Procedure: CORONARY ARTERY BYPASS GRAFT (CABG)X2 LEFT LEG ENDOSCOPIC SAPHENOUS VESSEL PROCUREMENT, LEFT INTERNAL MAMMARY ARTERY HARVEST, ANESTHESIA TRANSESOPHAGEAL ECHOCARDIOGRAM;EPI AORTIC ULTRASOUND;  Surgeon: Carline Bradshaw MD;  Location: [facility] OR    COLONOSCOPY N/A 1/2/2024    Procedure: COLONOSCOPY WITH POLYPECTOMY;  Surgeon: Tonie Lawson MD;  Location: [facility] OR    CV CORONARY ANGIOGRAM N/A 8/10/2022    Procedure: Coronary Angiogram;  Surgeon: Annette Leavitt MD;  Location: [facility] CATH LAB CV    CV LEFT HEART CATH N/A 8/10/2022     Procedure: Left Heart Catheterization;  Surgeon: Annette Leavitt MD;  Location: [facility] LAB CV    tendon right wrist Right      No family history on file.     Social History     Socioeconomic History    Marital status:      Spouse name: Not on file    Number of children: Not on file    Years of education: Not on file    Highest education level: Not on file   Occupational History    Not on file   Tobacco Use    Smoking status: Never     Passive exposure: Never    Smokeless tobacco: Never   Vaping Use    Vaping status: Never Used   Substance and Sexual Activity    Alcohol use: Not Currently    Drug use: Never    Sexual activity: Not on file   Other Topics Concern    Parent/sibling w/ CABG, MI or angioplasty before 65F 55M? Not Asked   Social History Narrative    Not on file     Social Determinants of Health     Financial Resource Strain: Not on file   Food Insecurity: Not on file   Transportation Needs: Not on file   Physical Activity: Not on file   Stress: Not on file   Social Connections: Not on file   Interpersonal Safety: Not on file   Housing Stability: Not on file           Medications  Allergies   Current Outpatient Medications   Medication Sig Dispense Refill    aspirin (ASA) 81 MG chewable tablet 2 tablets (162 mg) by Oral or NG Tube route daily 60 tablet 3    atorvastatin (LIPITOR) 40 MG tablet Take 40 mg by mouth daily      finasteride (PROSCAR) 5 MG tablet Take 5 mg by mouth daily      FLUoxetine (PROZAC) 20 MG capsule Take 60 mg by mouth daily      glipiZIDE (GLUCOTROL XL) 5 MG 24 hr tablet Take 5 mg by mouth 2 times daily (before meals)      lactobacillus rhamnosus (GG) (CULTURELL) capsule Take 1 capsule by mouth daily      levothyroxine (SYNTHROID/LEVOTHROID) 25 MCG tablet Take 25 mcg by mouth daily.      metFORMIN (GLUCOPHAGE) 1000 MG tablet [METFORMIN (GLUCOPHAGE) 1000 MG TABLET] Take 1,000 mg by mouth 2 (two) times a day with meals.      tamsulosin (FLOMAX) 0.4 mg Cp24 Take 0.4 mg by  mouth every evening      vitamin D3 (CHOLECALCIFEROL) 50 mcg (2000 units) tablet Take 2 tablets by mouth daily      Lancets (ONETOUCH DELICA PLUS [id number]) MISC See Admin Instructions       No Known Allergies       Lab Results    Chemistry/lipid CBC Cardiac Enzymes/BNP/TSH/INR   Recent Labs   Lab Test 04/02/24  0904   CHOL 123   HDL 37*   LDL 68   TRIG 91     Recent Labs   Lab Test 04/02/24  0904 05/11/23  1630 08/10/22  0710   LDL 68 67 69     Recent Labs   Lab Test 04/02/24  0904      POTASSIUM 4.1   CHLORIDE 104   CO2 25   *   BUN 16.4   CR 0.92   GFRESTIMATED 88   EVELYNE 9.0     Recent Labs   Lab Test 04/02/24  0904 12/08/23  1221 05/11/23  1630   CR 0.92 0.88 0.89     Recent Labs   Lab Test 08/26/22  0905   A1C 6.1*          Recent Labs   Lab Test 05/03/24  1606 04/02/24  0904   WBC  --  6.3   HGB 13.1 13.0*   HCT  --  37.9*   MCV  --  94   PLT  --  372     Recent Labs   Lab Test 05/03/24  1606 04/02/24  0904 09/16/22  0423   HGB 13.1 13.0* 10.5*    Recent Labs   Lab Test 09/14/22  0507 09/13/22  2334 09/13/22  1243   TROPONINI 0.03 0.02 0.03     Recent Labs   Lab Test 09/13/22  1243   *     Recent Labs   Lab Test 04/02/24  0904   TSH 3.33     Recent Labs   Lab Test 09/13/22  1243 09/02/22  1200 09/02/22  1113   INR 1.01 1.37* 1.43*          Medications  Allergies   Current Outpatient Medications   Medication Sig Dispense Refill    aspirin (ASA) 81 MG chewable tablet 2 tablets (162 mg) by Oral or NG Tube route daily 60 tablet 3    atorvastatin (LIPITOR) 40 MG tablet Take 40 mg by mouth daily      finasteride (PROSCAR) 5 MG tablet Take 5 mg by mouth daily      FLUoxetine (PROZAC) 20 MG capsule Take 60 mg by mouth daily      glipiZIDE (GLUCOTROL XL) 5 MG 24 hr tablet Take 5 mg by mouth 2 times daily (before meals)      lactobacillus rhamnosus (GG) (CULTURELL) capsule Take 1 capsule by mouth daily      levothyroxine (SYNTHROID/LEVOTHROID) 25 MCG tablet Take 25 mcg by mouth daily.      metFORMIN  (GLUCOPHAGE) 1000 MG tablet [METFORMIN (GLUCOPHAGE) 1000 MG TABLET] Take 1,000 mg by mouth 2 (two) times a day with meals.      tamsulosin (FLOMAX) 0.4 mg Cp24 Take 0.4 mg by mouth every evening      vitamin D3 (CHOLECALCIFEROL) 50 mcg (2000 units) tablet Take 2 tablets by mouth daily      Lancets (ONETOUCH DELICA PLUS [id number]) MISC See Admin Instructions        No Known Allergies       Lab Results   Lab Results   Component Value Date     04/02/2024    CO2 25 04/02/2024    CO2 23 09/16/2022    BUN 16.4 04/02/2024    BUN 16 09/16/2022     Lab Results   Component Value Date    WBC 6.3 04/02/2024    HGB 13.1 05/03/2024    HGB 13.0 04/02/2024    HCT 37.9 04/02/2024    MCV 94 04/02/2024     04/02/2024     Lab Results   Component Value Date    CHOL 123 04/02/2024    TRIG 91 04/02/2024    HDL 37 04/02/2024     Lab Results   Component Value Date    INR 1.01 09/13/2022     Lab Results   Component Value Date     09/13/2022     Lab Results   Component Value Date    TROPONINI 0.03 09/14/2022    TROPONINI 0.02 09/13/2022    TROPONINI 0.03 09/13/2022     Lab Results   Component Value Date    TSH 3.33 04/02/2024    TSH 2.12 09/17/2021

## 2024-12-07 ENCOUNTER — HEALTH MAINTENANCE LETTER (OUTPATIENT)
Age: 74
End: 2024-12-07

## 2025-02-15 ENCOUNTER — HEALTH MAINTENANCE LETTER (OUTPATIENT)
Age: 75
End: 2025-02-15

## 2025-03-27 ENCOUNTER — LAB REQUISITION (OUTPATIENT)
Dept: LAB | Facility: CLINIC | Age: 75
End: 2025-03-27

## 2025-03-27 DIAGNOSIS — G31.84 MILD COGNITIVE IMPAIRMENT OF UNCERTAIN OR UNKNOWN ETIOLOGY: ICD-10-CM

## 2025-03-27 DIAGNOSIS — E78.1 PURE HYPERGLYCERIDEMIA: ICD-10-CM

## 2025-03-27 DIAGNOSIS — E03.2 HYPOTHYROIDISM DUE TO MEDICAMENTS AND OTHER EXOGENOUS SUBSTANCES: ICD-10-CM

## 2025-03-27 DIAGNOSIS — Z12.5 ENCOUNTER FOR SCREENING FOR MALIGNANT NEOPLASM OF PROSTATE: ICD-10-CM

## 2025-03-27 LAB
ALBUMIN SERPL BCG-MCNC: 3.8 G/DL (ref 3.5–5.2)
ALP SERPL-CCNC: 88 U/L (ref 40–150)
ALT SERPL W P-5'-P-CCNC: 18 U/L (ref 0–70)
ANION GAP SERPL CALCULATED.3IONS-SCNC: 13 MMOL/L (ref 7–15)
AST SERPL W P-5'-P-CCNC: 14 U/L (ref 0–45)
BILIRUB SERPL-MCNC: 0.7 MG/DL
BUN SERPL-MCNC: 21.3 MG/DL (ref 8–23)
CALCIUM SERPL-MCNC: 9.3 MG/DL (ref 8.8–10.4)
CHLORIDE SERPL-SCNC: 99 MMOL/L (ref 98–107)
CHOLEST SERPL-MCNC: 117 MG/DL
CREAT SERPL-MCNC: 0.86 MG/DL (ref 0.67–1.17)
EGFRCR SERPLBLD CKD-EPI 2021: >90 ML/MIN/1.73M2
ERYTHROCYTE [DISTWIDTH] IN BLOOD BY AUTOMATED COUNT: 13.4 % (ref 10–15)
FASTING STATUS PATIENT QL REPORTED: ABNORMAL
FASTING STATUS PATIENT QL REPORTED: ABNORMAL
GLUCOSE SERPL-MCNC: 201 MG/DL (ref 70–99)
HCO3 SERPL-SCNC: 24 MMOL/L (ref 22–29)
HCT VFR BLD AUTO: 38.4 % (ref 40–53)
HDLC SERPL-MCNC: 37 MG/DL
HGB BLD-MCNC: 12.6 G/DL (ref 13.3–17.7)
LDLC SERPL CALC-MCNC: 66 MG/DL
MCH RBC QN AUTO: 31.7 PG (ref 26.5–33)
MCHC RBC AUTO-ENTMCNC: 32.8 G/DL (ref 31.5–36.5)
MCV RBC AUTO: 97 FL (ref 78–100)
NONHDLC SERPL-MCNC: 80 MG/DL
PLATELET # BLD AUTO: 356 10E3/UL (ref 150–450)
POTASSIUM SERPL-SCNC: 4.1 MMOL/L (ref 3.4–5.3)
PROT SERPL-MCNC: 6.9 G/DL (ref 6.4–8.3)
PSA SERPL DL<=0.01 NG/ML-MCNC: 0.19 NG/ML (ref 0–6.5)
RBC # BLD AUTO: 3.98 10E6/UL (ref 4.4–5.9)
SODIUM SERPL-SCNC: 136 MMOL/L (ref 135–145)
TRIGL SERPL-MCNC: 68 MG/DL
TSH SERPL DL<=0.005 MIU/L-ACNC: 2.86 UIU/ML (ref 0.3–4.2)
VIT B12 SERPL-MCNC: 701 PG/ML (ref 232–1245)
WBC # BLD AUTO: 12.2 10E3/UL (ref 4–11)

## 2025-03-27 PROCEDURE — 85018 HEMOGLOBIN: CPT | Performed by: FAMILY MEDICINE

## 2025-03-27 PROCEDURE — 82607 VITAMIN B-12: CPT | Performed by: FAMILY MEDICINE

## 2025-03-27 PROCEDURE — 82310 ASSAY OF CALCIUM: CPT | Performed by: FAMILY MEDICINE

## 2025-03-27 PROCEDURE — 83718 ASSAY OF LIPOPROTEIN: CPT | Performed by: FAMILY MEDICINE

## 2025-03-27 PROCEDURE — G0103 PSA SCREENING: HCPCS | Performed by: FAMILY MEDICINE

## 2025-03-27 PROCEDURE — 85041 AUTOMATED RBC COUNT: CPT | Performed by: FAMILY MEDICINE

## 2025-03-27 PROCEDURE — 84443 ASSAY THYROID STIM HORMONE: CPT | Performed by: FAMILY MEDICINE

## 2025-08-31 ENCOUNTER — HEALTH MAINTENANCE LETTER (OUTPATIENT)
Age: 75
End: 2025-08-31

## (undated) DEVICE — SU STRATAFIX PDS PLUS 0 CT 45CM SXPP1A406

## (undated) DEVICE — PACK MINOR SINGLE BASIN SSK3001

## (undated) DEVICE — LIGACLIP LARGE AESCULAP O4120-1

## (undated) DEVICE — INTRO MICRO MINI STICK 4FR STD NITINOL

## (undated) DEVICE — ELECTRODE ADULT PACING MULTI P-211-M1

## (undated) DEVICE — BANDAGE ELASTIC 4X550 LF DBL 593-94LF

## (undated) DEVICE — ESU ELEC BLADE E-SEP INSULATED NEPTUNE 70MM 0703-070-002

## (undated) DEVICE — SYR ANGIOGRAPHY MULTIUSE KIT ACIST 014612

## (undated) DEVICE — CLIP SPRING FOGARTY SOFTJAW CSOFT6

## (undated) DEVICE — SU ETHIBOND 3-0 BBDA 36" X588H

## (undated) DEVICE — DRSG DRAIN 4X4" 7086

## (undated) DEVICE — RX SURGIFLO HEMOSTATIC MATRIX W/THROMBIN 8ML 2994

## (undated) DEVICE — MANIFOLD KIT ANGIO AUTOMATED 014613

## (undated) DEVICE — PREP CHLORAPREP 26ML TINTED HI-LITE ORANGE 930815

## (undated) DEVICE — CATH THORACIC STRAIGHT CLOTSTOP 32FR

## (undated) DEVICE — SOL NACL 0.9% IRRIG 1000ML BOTTLE 2F7124

## (undated) DEVICE — HEMOSTASIS BONE OSTENE 2.5G SYNTHETIC 1503832

## (undated) DEVICE — STRATAFIX

## (undated) DEVICE — GOWN IMPERVIOUS BREATHABLE SMART LG 89015

## (undated) DEVICE — SYSTEM PANNUS RETENTION 4 PAD 2 STRAP CZ-PRS-04

## (undated) DEVICE — SU PROLENE 7-0 BV175-6 24' M8737

## (undated) DEVICE — SUCTION DRY CHEST DRAIN OASIS 3600-100

## (undated) DEVICE — LEAD PACER MYOCARDIAL BIPOLAR TEMPORARY 53CM 6495F

## (undated) DEVICE — GLOVE GAMMEX NEOPRENE ULTRA SZ 7 LF 8514

## (undated) DEVICE — CATH ANGIO INFINITI JL3.5 4FRX100CM 538418

## (undated) DEVICE — SHEATH GLIDE RADIAL 4FR 25CM 0.021

## (undated) DEVICE — CUSTOM PACK CORONARY SAN5BCRHEA

## (undated) DEVICE — SU PLEDGET SOFT TFE 3/8"X3/26"X1/16" PCP40

## (undated) DEVICE — ESU ELEC BLADE 2.75" COATED/INSULATED E1455

## (undated) DEVICE — SURGICEL POWDER ABSORBABLE HEMOSTAT 3GM 3013SP

## (undated) DEVICE — SLEEVE TR BAND RADIAL COMPRESSION DEVICE 24CM TRB24-REG

## (undated) DEVICE — ADH SKIN CLOSURE PREMIERPRO EXOFIN 1.0ML 3470

## (undated) DEVICE — PLATE GROUNDING ADULT W/CORD 9165L

## (undated) DEVICE — SUCTION SLEEVE NEPTUNE 2 125MM 0703-005-125

## (undated) DEVICE — CUSTOM PACK CAB SCV5BCBHEA

## (undated) DEVICE — CABLE MYO/LEAD PACING BLUE DISP 019-535

## (undated) DEVICE — DRAPE IOBAN INCISE 36X23" 6651EZ

## (undated) DEVICE — KIT ENDO VASOVIEW HARVESTING SYSTEM VH-3200

## (undated) DEVICE — CATH DIAG 4FR JR 5.0 538423

## (undated) DEVICE — SUCTION CANISTER MEDIVAC LINER 3000ML W/LID 65651-530

## (undated) DEVICE — SOL WATER IRRIG 1000ML BOTTLE 2F7114

## (undated) DEVICE — CATH SUCTION 14FR W/O CTRL DYND41962

## (undated) DEVICE — TUBING SMOKE EVAC 7/8X8" W/WAND

## (undated) DEVICE — PITCHER STERILE 1000ML  SSK9004A

## (undated) DEVICE — Device

## (undated) DEVICE — SU STRATAFIX PDS PLUS 2-0 SPIRAL CT-1 30CM SXPP1B410

## (undated) DEVICE — 0.035IN X 260CM INQWIRE DIAGNOSTIC GUIDEWIRE, FIXED-CORE PTFE COATED, 3MM J-TIP

## (undated) DEVICE — CLIP HORIZON MULTI SM YELLOW 001204

## (undated) DEVICE — CABLE MYO/LEAD PACING WHITE DISP 019-530

## (undated) DEVICE — GUIDEWARE STRT 0.035X180CM

## (undated) DEVICE — CONNECTOR BLAKE DRAIN SGL

## (undated) DEVICE — CUSTOM PACK CV ST JOES SCV5BCVHEA

## (undated) DEVICE — ESU PENCIL SMOKE EVAC W/ROCKER SWITCH 0703-047-000

## (undated) DEVICE — GLOVE LINER COTTON MED 32-2076

## (undated) DEVICE — BLANKET BAIR ADLT PLYMR 60X36IN 63000

## (undated) DEVICE — KIT HAND CONTROL ACIST 016795

## (undated) RX ORDER — EPINEPHRINE IN SOD CHLOR,ISO 1 MG/10 ML
SYRINGE (ML) INTRAVENOUS
Status: DISPENSED
Start: 2022-09-02

## (undated) RX ORDER — HEPARIN SODIUM 1000 [USP'U]/ML
INJECTION, SOLUTION INTRAVENOUS; SUBCUTANEOUS
Status: DISPENSED
Start: 2022-08-10

## (undated) RX ORDER — FENTANYL CITRATE 50 UG/ML
INJECTION, SOLUTION INTRAMUSCULAR; INTRAVENOUS
Status: DISPENSED
Start: 2022-09-02

## (undated) RX ORDER — KETAMINE HYDROCHLORIDE 10 MG/ML
INJECTION INTRAMUSCULAR; INTRAVENOUS
Status: DISPENSED
Start: 2022-09-02

## (undated) RX ORDER — PROPOFOL 10 MG/ML
INJECTION, EMULSION INTRAVENOUS
Status: DISPENSED
Start: 2022-09-02

## (undated) RX ORDER — FENTANYL CITRATE-0.9 % NACL/PF 10 MCG/ML
PLASTIC BAG, INJECTION (ML) INTRAVENOUS
Status: DISPENSED
Start: 2022-09-02

## (undated) RX ORDER — PROTAMINE SULFATE 10 MG/ML
INJECTION, SOLUTION INTRAVENOUS
Status: DISPENSED
Start: 2022-09-02

## (undated) RX ORDER — DIAZEPAM 5 MG
TABLET ORAL
Status: DISPENSED
Start: 2022-08-10

## (undated) RX ORDER — VANCOMYCIN HYDROCHLORIDE 1 G/20ML
INJECTION, POWDER, LYOPHILIZED, FOR SOLUTION INTRAVENOUS
Status: DISPENSED
Start: 2022-09-02

## (undated) RX ORDER — EPHEDRINE SULFATE 50 MG/ML
INJECTION, SOLUTION INTRAMUSCULAR; INTRAVENOUS; SUBCUTANEOUS
Status: DISPENSED
Start: 2022-09-02

## (undated) RX ORDER — FENTANYL CITRATE 50 UG/ML
INJECTION, SOLUTION INTRAMUSCULAR; INTRAVENOUS
Status: DISPENSED
Start: 2022-08-10